# Patient Record
Sex: MALE | Race: WHITE | NOT HISPANIC OR LATINO | Employment: OTHER | RURAL
[De-identification: names, ages, dates, MRNs, and addresses within clinical notes are randomized per-mention and may not be internally consistent; named-entity substitution may affect disease eponyms.]

---

## 2017-02-08 RX ORDER — AMLODIPINE BESYLATE 5 MG/1
TABLET ORAL
Qty: 90 TABLET | Refills: 0 | Status: SHIPPED | OUTPATIENT
Start: 2017-02-08 | End: 2017-04-19 | Stop reason: SDUPTHER

## 2017-02-08 RX ORDER — ESOMEPRAZOLE MAGNESIUM 40 MG/1
CAPSULE, DELAYED RELEASE ORAL
Qty: 90 CAPSULE | Refills: 0 | Status: SHIPPED | OUTPATIENT
Start: 2017-02-08 | End: 2017-04-19 | Stop reason: SDUPTHER

## 2017-02-17 ENCOUNTER — LAB (OUTPATIENT)
Dept: FAMILY MEDICINE CLINIC | Facility: CLINIC | Age: 72
End: 2017-02-17

## 2017-02-17 DIAGNOSIS — R97.20 ELEVATED PSA: ICD-10-CM

## 2017-02-17 DIAGNOSIS — I10 ESSENTIAL HYPERTENSION: Primary | ICD-10-CM

## 2017-02-17 DIAGNOSIS — E78.5 HYPERLIPIDEMIA, UNSPECIFIED HYPERLIPIDEMIA TYPE: ICD-10-CM

## 2017-02-17 LAB
ALBUMIN SERPL-MCNC: 4.5 G/DL (ref 3.5–5)
ALBUMIN/GLOB SERPL: 1.6 G/DL (ref 1.1–2.5)
ALP SERPL-CCNC: 111 U/L (ref 24–120)
ALT SERPL-CCNC: 38 U/L (ref 0–54)
AST SERPL-CCNC: 28 U/L (ref 7–45)
BASOPHILS # BLD AUTO: 0.04 10*3/MM3 (ref 0–0.2)
BASOPHILS NFR BLD AUTO: 0.9 % (ref 0–2)
BILIRUB SERPL-MCNC: 0.6 MG/DL (ref 0.1–1)
BUN SERPL-MCNC: 19 MG/DL (ref 5–21)
BUN/CREAT SERPL: 17.1 (ref 7–25)
CALCIUM SERPL-MCNC: 9.6 MG/DL (ref 8.4–10.4)
CHLORIDE SERPL-SCNC: 103 MMOL/L (ref 98–110)
CHOLEST SERPL-MCNC: 186 MG/DL (ref 130–200)
CO2 SERPL-SCNC: 30 MMOL/L (ref 24–31)
CREAT SERPL-MCNC: 1.11 MG/DL (ref 0.5–1.4)
EOSINOPHIL # BLD AUTO: 0.15 10*3/MM3 (ref 0–0.7)
EOSINOPHIL NFR BLD AUTO: 3.3 % (ref 0–4)
ERYTHROCYTE [DISTWIDTH] IN BLOOD BY AUTOMATED COUNT: 13.5 % (ref 12–15)
GLOBULIN SER CALC-MCNC: 2.8 GM/DL
GLUCOSE SERPL-MCNC: 107 MG/DL (ref 70–100)
HCT VFR BLD AUTO: 46 % (ref 40–52)
HDLC SERPL-MCNC: 40 MG/DL
HGB BLD-MCNC: 16 G/DL (ref 14–18)
IMM GRANULOCYTES # BLD: 0.01 10*3/MM3 (ref 0–0.03)
IMM GRANULOCYTES NFR BLD: 0.2 % (ref 0–5)
LDLC SERPL CALC-MCNC: 124 MG/DL (ref 0–99)
LYMPHOCYTES # BLD AUTO: 1.08 10*3/MM3 (ref 0.72–4.86)
LYMPHOCYTES NFR BLD AUTO: 23.8 % (ref 15–45)
MCH RBC QN AUTO: 30.5 PG (ref 28–32)
MCHC RBC AUTO-ENTMCNC: 34.8 G/DL (ref 33–36)
MCV RBC AUTO: 87.6 FL (ref 82–95)
MONOCYTES # BLD AUTO: 0.53 10*3/MM3 (ref 0.19–1.3)
MONOCYTES NFR BLD AUTO: 11.7 % (ref 4–12)
NEUTROPHILS # BLD AUTO: 2.73 10*3/MM3 (ref 1.87–8.4)
NEUTROPHILS NFR BLD AUTO: 60.1 % (ref 39–78)
PLATELET # BLD AUTO: 241 10*3/MM3 (ref 130–400)
POTASSIUM SERPL-SCNC: 4.3 MMOL/L (ref 3.5–5.3)
PROT SERPL-MCNC: 7.3 G/DL (ref 6.3–8.7)
PSA SERPL-MCNC: 5.52 NG/ML (ref 0–4)
RBC # BLD AUTO: 5.25 10*6/MM3 (ref 4.8–5.9)
SODIUM SERPL-SCNC: 142 MMOL/L (ref 135–145)
TRIGL SERPL-MCNC: 108 MG/DL (ref 0–149)
TSH SERPL DL<=0.005 MIU/L-ACNC: 0.69 MIU/ML (ref 0.47–4.68)
VLDLC SERPL CALC-MCNC: 21.6 MG/DL
WBC # BLD AUTO: 4.54 10*3/MM3 (ref 4.8–10.8)

## 2017-02-22 DIAGNOSIS — R97.20 ELEVATED PSA: Primary | ICD-10-CM

## 2017-02-24 ENCOUNTER — OFFICE VISIT (OUTPATIENT)
Dept: FAMILY MEDICINE CLINIC | Facility: CLINIC | Age: 72
End: 2017-02-24

## 2017-02-24 VITALS
RESPIRATION RATE: 16 BRPM | SYSTOLIC BLOOD PRESSURE: 150 MMHG | BODY MASS INDEX: 30.54 KG/M2 | WEIGHT: 195 LBS | OXYGEN SATURATION: 97 % | HEART RATE: 69 BPM | DIASTOLIC BLOOD PRESSURE: 92 MMHG

## 2017-02-24 DIAGNOSIS — E78.2 MIXED HYPERLIPIDEMIA: ICD-10-CM

## 2017-02-24 DIAGNOSIS — K21.9 GERD WITHOUT ESOPHAGITIS: ICD-10-CM

## 2017-02-24 DIAGNOSIS — N52.9 ERECTILE DYSFUNCTION, UNSPECIFIED ERECTILE DYSFUNCTION TYPE: ICD-10-CM

## 2017-02-24 DIAGNOSIS — I10 ESSENTIAL HYPERTENSION: Primary | ICD-10-CM

## 2017-02-24 PROCEDURE — 99214 OFFICE O/P EST MOD 30 MIN: CPT | Performed by: FAMILY MEDICINE

## 2017-02-24 NOTE — PROGRESS NOTES
Subjective   Nehemiah Gomez is a 71 y.o. male.     Chief Complaint   Patient presents with   • Follow-up    on htn    History of Present Illness     he notes feeling good...denies any cp or ha..he is tolerating zetia without myalgais ...his gerd symptoms controleld without dysphgai or odynophagia...viagra working for e.d. symptoms      Current Outpatient Prescriptions:   •  amitriptyline (ELAVIL) 10 MG tablet, daily., Disp: , Rfl:   •  amLODIPine (NORVASC) 5 MG tablet, TAKE 1 TABLET BY MOUTH DAILY, Disp: 90 tablet, Rfl: 0  •  aspirin 81 MG chewable tablet, Take 81 mg by mouth daily., Disp: , Rfl:   •  esomeprazole (nexIUM) 40 MG capsule, TAKE 1 CAPSULE BY MOUTH DAILY, Disp: 90 capsule, Rfl: 0  •  ezetimibe (ZETIA) 10 MG tablet, Take 10 mg by mouth daily., Disp: , Rfl:   •  metoprolol tartrate (LOPRESSOR) 50 MG tablet, Take 50 mg by mouth 2 times daily., Disp: , Rfl:   •  sildenafil (VIAGRA) 100 MG tablet, Take 100 mg by mouth as needed for Erectile Dysfunction., Disp: , Rfl:   No Known Allergies    Past Medical History   Diagnosis Date   • Diverticulosis    • Erectile disorder due to 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    • GERD (gastroesophageal reflux disease)    • Hyperlipidemia    • Insomnia      Past Surgical History   Procedure Laterality Date   • Cholecystectomy         Review of Systems   Constitutional: Negative.    HENT: Negative.    Eyes: Negative.    Respiratory: Negative.    Cardiovascular: Negative.    Gastrointestinal: Negative.    Endocrine: Negative.    Genitourinary: Negative.    Musculoskeletal: Negative.    Skin: Negative.    Allergic/Immunologic: Negative.    Neurological: Negative.    Hematological: Negative.    Psychiatric/Behavioral: Negative.        Objective    Visit Vitals   • /92   • Pulse 69   • Resp 16   • Wt 195 lb (88.5 kg)   • SpO2 97%   • BMI 30.54 kg/m2     Physical Exam   Constitutional: He is oriented to person, place, and time. He appears well-developed and well-nourished.    HENT:   Head: Normocephalic and atraumatic.   Right Ear: External ear normal.   Left Ear: External ear normal.   Nose: Nose normal.   Mouth/Throat: Oropharynx is clear and moist.   Eyes: Conjunctivae and EOM are normal. Pupils are equal, round, and reactive to light.   Neck: Normal range of motion. Neck supple.   Cardiovascular: Normal rate, regular rhythm, normal heart sounds and intact distal pulses.    Pulmonary/Chest: Effort normal and breath sounds normal.   Abdominal: Soft. Bowel sounds are normal.   Musculoskeletal: Normal range of motion.   Neurological: He is alert and oriented to person, place, and time. He has normal reflexes.   Skin: Skin is warm and dry.   Psychiatric: He has a normal mood and affect. His behavior is normal. Judgment and thought content normal.   Nursing note and vitals reviewed.      Assessment/Plan   Nehemiah was seen today for follow-up.    Diagnoses and all orders for this visit:    Essential hypertension    Mixed hyperlipidemia    GERD without esophagitis    Erectile dysfunction, unspecified erectile dysfunction type    we discussed psa elevation--he has appt with dr choi soon  He says his colon is up to date             No orders of the defined types were placed in this encounter.      Follow up: 6 month(s)

## 2017-03-02 ENCOUNTER — OFFICE VISIT (OUTPATIENT)
Dept: UROLOGY | Facility: CLINIC | Age: 72
End: 2017-03-02

## 2017-03-02 VITALS
BODY MASS INDEX: 31.88 KG/M2 | TEMPERATURE: 97 F | DIASTOLIC BLOOD PRESSURE: 90 MMHG | HEIGHT: 66 IN | WEIGHT: 198.4 LBS | SYSTOLIC BLOOD PRESSURE: 122 MMHG

## 2017-03-02 DIAGNOSIS — R97.20 ELEVATED PROSTATE SPECIFIC ANTIGEN (PSA): Primary | ICD-10-CM

## 2017-03-02 DIAGNOSIS — N13.8 BPH (BENIGN PROSTATIC HYPERTROPHY) WITH URINARY OBSTRUCTION: ICD-10-CM

## 2017-03-02 DIAGNOSIS — N40.1 BPH (BENIGN PROSTATIC HYPERTROPHY) WITH URINARY OBSTRUCTION: ICD-10-CM

## 2017-03-02 LAB
BILIRUB BLD-MCNC: NEGATIVE MG/DL
CLARITY, POC: CLEAR
COLOR UR: YELLOW
GLUCOSE UR STRIP-MCNC: NEGATIVE MG/DL
KETONES UR QL: NEGATIVE
LEUKOCYTE EST, POC: NEGATIVE
NITRITE UR-MCNC: NEGATIVE MG/ML
PH UR: 6.5 [PH] (ref 5–8)
PROT UR STRIP-MCNC: NEGATIVE MG/DL
RBC # UR STRIP: NEGATIVE /UL
SP GR UR: 1.01 (ref 1–1.03)
UROBILINOGEN UR QL: NORMAL

## 2017-03-02 PROCEDURE — 99214 OFFICE O/P EST MOD 30 MIN: CPT | Performed by: UROLOGY

## 2017-03-02 PROCEDURE — 81003 URINALYSIS AUTO W/O SCOPE: CPT | Performed by: UROLOGY

## 2017-03-02 NOTE — PROGRESS NOTES
Subjective    Mr. Gomez is 71 y.o. male    CHIEF COMPLAINT: Elevated PSA    The patient was sent back to us from Dr. Whitman we had seen the patient is been almost a year and a half since we have seen him.  I has an elevated PSA of 5.4 his last PSA here was normal although in reviewing his chart he did have a spike in his PSA back in 2014 which then came back down to normal.    He has had no significant change in his BPH symptoms no urgency frequency nocturia fact his AUA score today is 0 he is had no urinary tract infections no gross hematuria etc.    I he said he has had intermittent virus all winter and I do not know whether that might have contributed to the elevated PSA    He also has a history of stones previously but is not had any long time his last KUB showed no calculi      History of Present Illness      The following portions of the patient's history were reviewed and updated as appropriate: allergies, current medications, past family history, past medical history, past social history, past surgical history and problem list.    Review of Systems   Constitutional: Negative.  Negative for chills and fever.   Gastrointestinal: Negative for abdominal distention, abdominal pain, anal bleeding, blood in stool and nausea.   Genitourinary: Negative for difficulty urinating, dysuria, flank pain, frequency, hematuria, penile pain, penile swelling and urgency.   Psychiatric/Behavioral: Negative.  Negative for agitation and confusion.         Current Outpatient Prescriptions:   •  amitriptyline (ELAVIL) 10 MG tablet, daily., Disp: , Rfl:   •  amLODIPine (NORVASC) 5 MG tablet, TAKE 1 TABLET BY MOUTH DAILY, Disp: 90 tablet, Rfl: 0  •  aspirin 81 MG chewable tablet, Take 81 mg by mouth daily., Disp: , Rfl:   •  esomeprazole (nexIUM) 40 MG capsule, TAKE 1 CAPSULE BY MOUTH DAILY, Disp: 90 capsule, Rfl: 0  •  ezetimibe (ZETIA) 10 MG tablet, Take 10 mg by mouth daily., Disp: , Rfl:   •  metoprolol tartrate (LOPRESSOR) 50  "MG tablet, Take 50 mg by mouth 2 times daily., Disp: , Rfl:   •  sildenafil (VIAGRA) 100 MG tablet, Take 100 mg by mouth as needed for Erectile Dysfunction., Disp: , Rfl:     Past Medical History   Diagnosis Date   • Diverticulosis    • Erectile disorder due to 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    • GERD (gastroesophageal reflux disease)    • Hyperlipidemia    • Insomnia        Past Surgical History   Procedure Laterality Date   • Cholecystectomy         Social History     Social History   • Marital status:      Spouse name: N/A   • Number of children: N/A   • Years of education: N/A     Occupational History   • retired      Social History Main Topics   • Smoking status: Former Smoker   • Smokeless tobacco: None   • Alcohol use Yes   • Drug use: No   • Sexual activity: Not Asked     Other Topics Concern   • None     Social History Narrative       Family History   Problem Relation Age of Onset   • No Known Problems Father    • No Known Problems Mother        Objective    Visit Vitals   • /90   • Temp 97 °F (36.1 °C)   • Ht 66\" (167.6 cm)   • Wt 198 lb 6.4 oz (90 kg)   • BMI 32.02 kg/m2       Physical Exam   Constitutional: He is oriented to person, place, and time. He appears well-developed and well-nourished.   Pulmonary/Chest: Effort normal.   Abdominal: Soft. He exhibits no distension and no mass. There is no tenderness. There is no rebound and no guarding. No hernia. Hernia confirmed negative in the right inguinal area and confirmed negative in the left inguinal area.   Genitourinary: Penis normal. Rectal exam shows no mass, no tenderness and anal tone normal. Enlarged: for the age of the patient. Right testis shows no mass, no swelling and no tenderness. Left testis shows no mass, no swelling and no tenderness. Circumcised. No hypospadias. No discharge found.   Genitourinary Comments:  The urethral meatus normal in position without evidence of stricture. Epididymis without mass or tenderness. Vas " Deferens is palpably normal.Anus and perineum without mass or tenderness. The prostate is approximately 30 ml. It is Symmetric, with a Soft consistency. There are no nodules present. . The seminal vesicles are Not palpable due to the size of the prostate.     Lymphadenopathy: No inguinal adenopathy noted on the right or left side.   Neurological: He is alert and oriented to person, place, and time.   Vitals reviewed.        Lab on 02/17/2017   Component Date Value Ref Range Status   • Glucose 02/17/2017 107* 70 - 100 mg/dL Final   • BUN 02/17/2017 19  5 - 21 mg/dL Final   • Creatinine 02/17/2017 1.11  0.50 - 1.40 mg/dL Final   • eGFR Non  Am 02/17/2017 65  >60 mL/min/1.73 Final   • eGFR African Am 02/17/2017 79  >60 mL/min/1.73 Final   • BUN/Creatinine Ratio 02/17/2017 17.1  7.0 - 25.0 Final   • Sodium 02/17/2017 142  135 - 145 mmol/L Final   • Potassium 02/17/2017 4.3  3.5 - 5.3 mmol/L Final   • Chloride 02/17/2017 103  98 - 110 mmol/L Final   • Total CO2 02/17/2017 30.0  24.0 - 31.0 mmol/L Final   • Calcium 02/17/2017 9.6  8.4 - 10.4 mg/dL Final   • Total Protein 02/17/2017 7.3  6.3 - 8.7 g/dL Final   • Albumin 02/17/2017 4.50  3.50 - 5.00 g/dL Final   • Globulin 02/17/2017 2.8  gm/dL Final   • A/G Ratio 02/17/2017 1.6  1.1 - 2.5 g/dL Final   • Total Bilirubin 02/17/2017 0.6  0.1 - 1.0 mg/dL Final   • Alkaline Phosphatase 02/17/2017 111  24 - 120 U/L Final   • AST (SGOT) 02/17/2017 28  7 - 45 U/L Final   • ALT (SGPT) 02/17/2017 38  0 - 54 U/L Final   • WBC 02/17/2017 4.54* 4.80 - 10.80 10*3/mm3 Final   • RBC 02/17/2017 5.25  4.80 - 5.90 10*6/mm3 Final   • Hemoglobin 02/17/2017 16.0  14.0 - 18.0 g/dL Final   • Hematocrit 02/17/2017 46.0  40.0 - 52.0 % Final   • MCV 02/17/2017 87.6  82.0 - 95.0 fL Final   • MCH 02/17/2017 30.5  28.0 - 32.0 pg Final   • MCHC 02/17/2017 34.8  33.0 - 36.0 g/dL Final   • RDW 02/17/2017 13.5  12.0 - 15.0 % Final   • Platelets 02/17/2017 241  130 - 400 10*3/mm3 Final   • Neutrophil  Rel % 02/17/2017 60.1  39.0 - 78.0 % Final   • Lymphocyte Rel % 02/17/2017 23.8  15.0 - 45.0 % Final   • Monocyte Rel % 02/17/2017 11.7  4.0 - 12.0 % Final   • Eosinophil Rel % 02/17/2017 3.3  0.0 - 4.0 % Final   • Basophil Rel % 02/17/2017 0.9  0.0 - 2.0 % Final   • Neutrophils Absolute 02/17/2017 2.73  1.87 - 8.40 10*3/mm3 Final   • Lymphocytes Absolute 02/17/2017 1.08  0.72 - 4.86 10*3/mm3 Final   • Monocytes Absolute 02/17/2017 0.53  0.19 - 1.30 10*3/mm3 Final   • Eosinophils Absolute 02/17/2017 0.15  0.00 - 0.70 10*3/mm3 Final   • Basophils Absolute 02/17/2017 0.04  0.00 - 0.20 10*3/mm3 Final   • Immature Granulocyte Rel % 02/17/2017 0.2  0.0 - 5.0 % Final   • Immature Grans Absolute 02/17/2017 0.01  0.00 - 0.03 10*3/mm3 Final   • Total Cholesterol 02/17/2017 186  130 - 200 mg/dL Final   • Triglycerides 02/17/2017 108  0 - 149 mg/dL Final   • HDL Cholesterol 02/17/2017 40  >=40 mg/dL Final   • VLDL Cholesterol 02/17/2017 21.6  mg/dL Final   • LDL Cholesterol  02/17/2017 124* 0 - 99 mg/dL Final   • TSH 02/17/2017 0.690  0.470 - 4.680 mIU/mL Final   • PSA 02/17/2017 5.520* 0.000 - 4.000 ng/mL Final       Results for orders placed or performed in visit on 03/02/17   POC Urinalysis Dipstick, Automated   Result Value Ref Range    Color Yellow Yellow, Straw, Dark Yellow, Thu    Clarity, UA Clear Clear    Glucose, UA Negative Negative, 1000 mg/dL (3+) mg/dL    Bilirubin Negative Negative    Ketones, UA Negative Negative    Specific Gravity  1.015 1.005 - 1.030    Blood, UA Negative Negative    pH, Urine 6.5 5.0 - 8.0    Protein, POC Negative Negative mg/dL    Urobilinogen, UA Normal Normal    Leukocytes Negative Negative    Nitrite, UA Negative Negative       Assessment and Plan    Nehemiah was seen today for elevated psa.    Diagnoses and all orders for this visit:    Elevated prostate specific antigen (PSA)  -     POC Urinalysis Dipstick, Automated  -     PSA, Total & Free; Future    BPH (benign prostatic  hypertrophy) with urinary obstruction    Plan--from a BPH point review he is asymptomatic and so we will just watch him along he does not need medications    I discussed the elevated PSA with the patient at length again he has had an elevated PSA before that come back down to normal so I do not think this unreasonable to repeat this again in 3-4 months with a free and total PSA.    I did indicate to him that obviously if it still elevated then we might want to consider an ultrasound and biopsy and he is comfortable but this with her questions today    EMR Dragon/Transcription disclaimer:  Much of this encounter note is an electronic transcription/translation of spoken language to printed text. The electronic translation of spoken language may permit erroneous, or at times, nonsensical words or phrases to be inadvertently transcribed; although I have reviewed the note for such errors, some may still exist.

## 2017-03-07 ENCOUNTER — RESULTS ENCOUNTER (OUTPATIENT)
Dept: UROLOGY | Facility: CLINIC | Age: 72
End: 2017-03-07

## 2017-03-07 DIAGNOSIS — R97.20 ELEVATED PROSTATE SPECIFIC ANTIGEN (PSA): ICD-10-CM

## 2017-03-14 RX ORDER — METOPROLOL TARTRATE 50 MG/1
TABLET, FILM COATED ORAL
Qty: 180 TABLET | Refills: 1 | Status: SHIPPED | OUTPATIENT
Start: 2017-03-14 | End: 2017-09-08 | Stop reason: SDUPTHER

## 2017-03-21 DIAGNOSIS — R05.9 COUGHING: Primary | ICD-10-CM

## 2017-03-22 ENCOUNTER — OFFICE VISIT (OUTPATIENT)
Dept: FAMILY MEDICINE CLINIC | Facility: CLINIC | Age: 72
End: 2017-03-22

## 2017-03-22 VITALS
RESPIRATION RATE: 16 BRPM | DIASTOLIC BLOOD PRESSURE: 80 MMHG | OXYGEN SATURATION: 97 % | WEIGHT: 199 LBS | BODY MASS INDEX: 31.98 KG/M2 | HEART RATE: 67 BPM | SYSTOLIC BLOOD PRESSURE: 132 MMHG | HEIGHT: 66 IN

## 2017-03-22 DIAGNOSIS — R05.9 COUGH: Primary | ICD-10-CM

## 2017-03-22 DIAGNOSIS — J40 BRONCHITIS: ICD-10-CM

## 2017-03-22 PROCEDURE — 99213 OFFICE O/P EST LOW 20 MIN: CPT | Performed by: FAMILY MEDICINE

## 2017-03-22 RX ORDER — MONTELUKAST SODIUM 10 MG/1
10 TABLET ORAL NIGHTLY
Qty: 30 TABLET | Refills: 2 | Status: SHIPPED | OUTPATIENT
Start: 2017-03-22 | End: 2017-07-03

## 2017-03-22 RX ORDER — CLARITHROMYCIN 500 MG/1
500 TABLET, COATED ORAL 2 TIMES DAILY
Qty: 20 TABLET | Refills: 0 | Status: SHIPPED | OUTPATIENT
Start: 2017-03-22 | End: 2017-04-19

## 2017-03-22 RX ORDER — METHYLPREDNISOLONE 4 MG/1
TABLET ORAL
Qty: 21 TABLET | Refills: 0 | Status: SHIPPED | OUTPATIENT
Start: 2017-03-22 | End: 2017-04-19

## 2017-03-22 RX ORDER — AMITRIPTYLINE HYDROCHLORIDE 10 MG/1
10 TABLET, FILM COATED ORAL DAILY
Qty: 90 TABLET | Refills: 1 | Status: SHIPPED | OUTPATIENT
Start: 2017-03-22 | End: 2017-09-08 | Stop reason: SDUPTHER

## 2017-03-22 NOTE — PROGRESS NOTES
Subjective   Nehemiah Gomez is a 71 y.o. male.     Chief Complaint   Patient presents with   • Cough       History of Present Illness     he devloped a cough last night --prod green sputum       Current Outpatient Prescriptions:   •  amLODIPine (NORVASC) 5 MG tablet, TAKE 1 TABLET BY MOUTH DAILY, Disp: 90 tablet, Rfl: 0  •  aspirin 81 MG chewable tablet, Take 81 mg by mouth daily., Disp: , Rfl:   •  esomeprazole (nexIUM) 40 MG capsule, TAKE 1 CAPSULE BY MOUTH DAILY, Disp: 90 capsule, Rfl: 0  •  ezetimibe (ZETIA) 10 MG tablet, Take 10 mg by mouth daily., Disp: , Rfl:   •  metoprolol tartrate (LOPRESSOR) 50 MG tablet, TAKE 1 TABLET BY MOUTH TWICE DAILY, Disp: 180 tablet, Rfl: 1  •  sildenafil (VIAGRA) 100 MG tablet, Take 100 mg by mouth as needed for Erectile Dysfunction., Disp: , Rfl:   •  amitriptyline (ELAVIL) 10 MG tablet, Take 1 tablet by mouth Daily., Disp: 90 tablet, Rfl: 1  •  clarithromycin (BIAXIN) 500 MG tablet, Take 1 tablet by mouth 2 (Two) Times a Day., Disp: 20 tablet, Rfl: 0  •  MethylPREDNISolone (MEDROL, ZUHAIR,) 4 MG tablet, Take as directed on package instructions., Disp: 21 tablet, Rfl: 0  •  montelukast (SINGULAIR) 10 MG tablet, Take 1 tablet by mouth Every Night., Disp: 30 tablet, Rfl: 2  No Known Allergies    Past Medical History:   Diagnosis Date   • Diverticulosis    • Erectile disorder due to 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    • GERD (gastroesophageal reflux disease)    • Hyperlipidemia    • Insomnia      Past Surgical History:   Procedure Laterality Date   • CHOLECYSTECTOMY         Review of Systems   Constitutional: Negative.    HENT: Negative.    Eyes: Negative.    Respiratory: Positive for cough.    Cardiovascular: Negative.    Gastrointestinal: Negative.    Endocrine: Negative.    Genitourinary: Negative.    Musculoskeletal: Negative.    Skin: Negative.    Allergic/Immunologic: Negative.    Neurological: Negative.    Hematological: Negative.    Psychiatric/Behavioral: Negative.   "      Objective  /80  Pulse 67  Resp 16  Ht 66\" (167.6 cm)  Wt 199 lb (90.3 kg)  SpO2 97%  BMI 32.12 kg/m2  Physical Exam   Constitutional: He is oriented to person, place, and time. He appears well-nourished.   HENT:   Head: Normocephalic and atraumatic.   Right Ear: External ear normal.   Left Ear: External ear normal.   Nose: Nose normal.   Mouth/Throat: Oropharynx is clear and moist.   Eyes: Conjunctivae and EOM are normal. Pupils are equal, round, and reactive to light.   Neck: Normal range of motion. Neck supple.   Cardiovascular: Normal rate, regular rhythm, normal heart sounds and intact distal pulses.    Pulmonary/Chest: Effort normal.   Rhonchi scattered   Abdominal: Soft. Bowel sounds are normal.   Musculoskeletal: Normal range of motion.   Neurological: He is alert and oriented to person, place, and time. He has normal reflexes.   Skin: Skin is warm and dry.   Psychiatric: He has a normal mood and affect. His behavior is normal. Judgment and thought content normal.   Nursing note and vitals reviewed.      Assessment/Plan   Nehemiah was seen today for cough.    Diagnoses and all orders for this visit:    Cough    Bronchitis    Other orders  -     clarithromycin (BIAXIN) 500 MG tablet; Take 1 tablet by mouth 2 (Two) Times a Day.  -     MethylPREDNISolone (MEDROL, ZUHAIR,) 4 MG tablet; Take as directed on package instructions.  -     montelukast (SINGULAIR) 10 MG tablet; Take 1 tablet by mouth Every Night.                 No orders of the defined types were placed in this encounter.      Follow up: 4 week(s)  "

## 2017-04-19 ENCOUNTER — OFFICE VISIT (OUTPATIENT)
Dept: FAMILY MEDICINE CLINIC | Facility: CLINIC | Age: 72
End: 2017-04-19

## 2017-04-19 VITALS
OXYGEN SATURATION: 97 % | HEART RATE: 59 BPM | WEIGHT: 198 LBS | SYSTOLIC BLOOD PRESSURE: 132 MMHG | HEIGHT: 66 IN | DIASTOLIC BLOOD PRESSURE: 90 MMHG | RESPIRATION RATE: 16 BRPM | BODY MASS INDEX: 31.82 KG/M2

## 2017-04-19 DIAGNOSIS — J40 BRONCHITIS: ICD-10-CM

## 2017-04-19 DIAGNOSIS — I10 ESSENTIAL HYPERTENSION: Primary | ICD-10-CM

## 2017-04-19 PROCEDURE — 99213 OFFICE O/P EST LOW 20 MIN: CPT | Performed by: FAMILY MEDICINE

## 2017-04-19 RX ORDER — ESOMEPRAZOLE MAGNESIUM 40 MG/1
40 CAPSULE, DELAYED RELEASE ORAL
Qty: 90 CAPSULE | Refills: 1 | Status: SHIPPED | OUTPATIENT
Start: 2017-04-19 | End: 2017-09-08 | Stop reason: ALTCHOICE

## 2017-04-19 RX ORDER — EZETIMIBE 10 MG/1
10 TABLET ORAL DAILY
Qty: 90 TABLET | Refills: 1 | Status: SHIPPED | OUTPATIENT
Start: 2017-04-19 | End: 2017-09-08 | Stop reason: SDUPTHER

## 2017-04-19 RX ORDER — AMLODIPINE BESYLATE 5 MG/1
5 TABLET ORAL DAILY
Qty: 90 TABLET | Refills: 1 | Status: SHIPPED | OUTPATIENT
Start: 2017-04-19 | End: 2017-09-08 | Stop reason: SDUPTHER

## 2017-04-19 NOTE — PROGRESS NOTES
"Subjective   Nehemiah Gomez is a 71 y.o. male.     Chief Complaint   Patient presents with   • Follow-up     cough-   resolved now        History of Present Illness     he notes his cough has resolved--he notes his bp has been dong well--no cp or dyspnea      Current Outpatient Prescriptions:   •  amitriptyline (ELAVIL) 10 MG tablet, Take 1 tablet by mouth Daily., Disp: 90 tablet, Rfl: 1  •  amLODIPine (NORVASC) 5 MG tablet, Take 1 tablet by mouth Daily., Disp: 90 tablet, Rfl: 1  •  aspirin 81 MG chewable tablet, Take 81 mg by mouth daily., Disp: , Rfl:   •  esomeprazole (nexIUM) 40 MG capsule, Take 1 capsule by mouth Every Morning Before Breakfast., Disp: 90 capsule, Rfl: 1  •  ezetimibe (ZETIA) 10 MG tablet, Take 1 tablet by mouth Daily., Disp: 90 tablet, Rfl: 1  •  metoprolol tartrate (LOPRESSOR) 50 MG tablet, TAKE 1 TABLET BY MOUTH TWICE DAILY, Disp: 180 tablet, Rfl: 1  •  montelukast (SINGULAIR) 10 MG tablet, Take 1 tablet by mouth Every Night., Disp: 30 tablet, Rfl: 2  •  sildenafil (VIAGRA) 100 MG tablet, Take 100 mg by mouth as needed for Erectile Dysfunction., Disp: , Rfl:   No Known Allergies    Past Medical History:   Diagnosis Date   • Diverticulosis    • Erectile disorder due to 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    • GERD (gastroesophageal reflux disease)    • Hyperlipidemia    • Insomnia      Past Surgical History:   Procedure Laterality Date   • CHOLECYSTECTOMY         Review of Systems   Constitutional: Negative.    HENT: Negative.    Eyes: Negative.    Respiratory: Positive for cough (resolved).    Cardiovascular: Negative.    Gastrointestinal: Negative.    Endocrine: Negative.    Genitourinary: Negative.    Musculoskeletal: Negative.    Skin: Negative.    Allergic/Immunologic: Negative.    Neurological: Negative.    Hematological: Negative.    Psychiatric/Behavioral: Negative.        Objective  /90  Pulse 59  Resp 16  Ht 66\" (167.6 cm)  Wt 198 lb (89.8 kg)  SpO2 97%  BMI 31.96 " kg/m2  Physical Exam   Constitutional: He is oriented to person, place, and time. He appears well-developed and well-nourished.   HENT:   Head: Normocephalic and atraumatic.   Right Ear: External ear normal.   Left Ear: External ear normal.   Nose: Nose normal.   Mouth/Throat: Oropharynx is clear and moist.   Eyes: Conjunctivae and EOM are normal. Pupils are equal, round, and reactive to light.   Neck: Normal range of motion. Neck supple.   Cardiovascular: Normal rate, regular rhythm, normal heart sounds and intact distal pulses.    Pulmonary/Chest: Effort normal and breath sounds normal.   Abdominal: Soft. Bowel sounds are normal.   Musculoskeletal: Normal range of motion.   Neurological: He is alert and oriented to person, place, and time. He has normal reflexes.   Skin: Skin is warm and dry.   Psychiatric: He has a normal mood and affect. His behavior is normal. Judgment and thought content normal.   Nursing note and vitals reviewed.      Assessment/Plan   Nehemiah was seen today for follow-up.    Diagnoses and all orders for this visit:    Essential hypertension    Bronchitis      im glad the cough is bwetter           No orders of the defined types were placed in this encounter.      Follow up: next week

## 2017-06-27 LAB
PSA FREE MFR SERPL: 16 %
PSA FREE SERPL-MCNC: 0.77 NG/ML
PSA SERPL-MCNC: 4.8 NG/ML (ref 0–4)

## 2017-07-03 ENCOUNTER — OFFICE VISIT (OUTPATIENT)
Dept: UROLOGY | Facility: CLINIC | Age: 72
End: 2017-07-03

## 2017-07-03 VITALS
TEMPERATURE: 96.7 F | DIASTOLIC BLOOD PRESSURE: 86 MMHG | WEIGHT: 193.2 LBS | HEIGHT: 67 IN | BODY MASS INDEX: 30.32 KG/M2 | SYSTOLIC BLOOD PRESSURE: 140 MMHG

## 2017-07-03 DIAGNOSIS — R97.20 ELEVATED PROSTATE SPECIFIC ANTIGEN (PSA): Primary | ICD-10-CM

## 2017-07-03 DIAGNOSIS — N40.1 BPH (BENIGN PROSTATIC HYPERTROPHY) WITH URINARY OBSTRUCTION: ICD-10-CM

## 2017-07-03 DIAGNOSIS — N13.8 BPH (BENIGN PROSTATIC HYPERTROPHY) WITH URINARY OBSTRUCTION: ICD-10-CM

## 2017-07-03 LAB
BILIRUB BLD-MCNC: NEGATIVE MG/DL
CLARITY, POC: CLEAR
COLOR UR: YELLOW
GLUCOSE UR STRIP-MCNC: NEGATIVE MG/DL
KETONES UR QL: NEGATIVE
LEUKOCYTE EST, POC: NEGATIVE
NITRITE UR-MCNC: NEGATIVE MG/ML
PH UR: 6 [PH] (ref 5–8)
PROT UR STRIP-MCNC: NEGATIVE MG/DL
RBC # UR STRIP: NEGATIVE /UL
SP GR UR: 1.02 (ref 1–1.03)
UROBILINOGEN UR QL: NORMAL

## 2017-07-03 PROCEDURE — 81003 URINALYSIS AUTO W/O SCOPE: CPT | Performed by: UROLOGY

## 2017-07-03 PROCEDURE — 99213 OFFICE O/P EST LOW 20 MIN: CPT | Performed by: UROLOGY

## 2017-07-03 NOTE — PROGRESS NOTES
Subjective    Mr. Gomez is 71 y.o. male    CHIEF COMPLAINT: Elevated PSA      The patient comes back today for follow-up of PSA as he had indicated before he did have some sort of chronic illness that lasted really until I saw him back in March he took some antibiotics that is pretty much now resolved his PSA is still slightly elevated at 4.8 but less than was previously of 5.4 his examination had been benign    History PSA was 16% so no real help there    As far as his BPH symptoms his AUA score today is 4 denies any gross hematuria urinary tract infection she is not on any medications for his prostate at this time          History of Present Illness      The following portions of the patient's history were reviewed and updated as appropriate: allergies, current medications, past family history, past medical history, past social history, past surgical history and problem list.    Review of Systems   Constitutional: Negative.  Negative for chills and fever.   Gastrointestinal: Negative for abdominal distention, abdominal pain, anal bleeding, blood in stool and nausea.   Genitourinary: Negative for difficulty urinating, dysuria, flank pain, frequency, hematuria and urgency.   Psychiatric/Behavioral: Negative.  Negative for agitation and confusion.         Current Outpatient Prescriptions:   •  amitriptyline (ELAVIL) 10 MG tablet, Take 1 tablet by mouth Daily., Disp: 90 tablet, Rfl: 1  •  amLODIPine (NORVASC) 5 MG tablet, Take 1 tablet by mouth Daily., Disp: 90 tablet, Rfl: 1  •  aspirin 81 MG chewable tablet, Take 81 mg by mouth daily., Disp: , Rfl:   •  esomeprazole (nexIUM) 40 MG capsule, Take 1 capsule by mouth Every Morning Before Breakfast., Disp: 90 capsule, Rfl: 1  •  ezetimibe (ZETIA) 10 MG tablet, Take 1 tablet by mouth Daily., Disp: 90 tablet, Rfl: 1  •  metoprolol tartrate (LOPRESSOR) 50 MG tablet, TAKE 1 TABLET BY MOUTH TWICE DAILY, Disp: 180 tablet, Rfl: 1  •  sildenafil (VIAGRA) 100 MG tablet, Take 100  "mg by mouth as needed for Erectile Dysfunction., Disp: , Rfl:     Past Medical History:   Diagnosis Date   • Diverticulosis    • Erectile disorder due to 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    • GERD (gastroesophageal reflux disease)    • Hyperlipidemia    • Hypertension    • Insomnia        Past Surgical History:   Procedure Laterality Date   • CHOLECYSTECTOMY         Social History     Social History   • Marital status:      Spouse name: N/A   • Number of children: N/A   • Years of education: N/A     Occupational History   • retired      Social History Main Topics   • Smoking status: Former Smoker   • Smokeless tobacco: None   • Alcohol use Yes   • Drug use: No   • Sexual activity: Not Asked     Other Topics Concern   • None     Social History Narrative       Family History   Problem Relation Age of Onset   • No Known Problems Father    • No Known Problems Mother        Objective    /86  Temp 96.7 °F (35.9 °C)  Ht 67\" (170.2 cm)  Wt 193 lb 3.2 oz (87.6 kg)  BMI 30.26 kg/m2    Physical Exam      Results Encounter on 03/07/2017   Component Date Value Ref Range Status   • PSA 06/26/2017 4.8* 0.0 - 4.0 ng/mL Final    Comment: Roche ECLIA methodology.  According to the American U[employer] Association, Serum PSA should  decrease and remain at undetectable levels after radical  prostatectomy. The AUA defines biochemical recurrence as an initial  PSA value 0.2 ng/mL or greater followed by a subsequent confirmatory  PSA value 0.2 ng/mL or greater.  Values obtained with different assay methods or kits cannot be used  interchangeably. Results cannot be interpreted as absolute evidence  of the presence or absence of malignant disease.     • PSA, Free 06/26/2017 0.77  N/A ng/mL Final    Roche ECLIA methodology.   • PSA, Free % 06/26/2017 16.0  % Final    Comment: The table below lists the probability of prostate cancer for  men with non-suspicious MIKEY results and total PSA between  4 and 10 ng/mL, by patient " age (Ronald et al, BARTOLO 1998,  279:1542).                    % Free PSA       50-64 yr        65-75 yr                    0.00-10.00%        56%             55%                   10.01-15.00%        24%             35%                   15.01-20.00%        17%             23%                   20.01-25.00%        10%             20%                        >25.00%         5%              9%  Please note:  Ronald et al did not make specific                recommendations regarding the use of                percent free PSA for any other population                of men.         Results for orders placed or performed in visit on 03/07/17   PSA, Total & Free   Result Value Ref Range    PSA 4.8 (H) 0.0 - 4.0 ng/mL    PSA, Free 0.77 N/A ng/mL    PSA, Free % 16.0 %       Assessment and Plan    Nehemiah was seen today for elevated psa.    Diagnoses and all orders for this visit:    Elevated prostate specific antigen (PSA)    BPH (benign prostatic hypertrophy) with urinary obstruction    Plan--I discussed the options the patient at this time regarding either a biopsy or continued observation.    I told him I could not guarantee that he does not have prostate cancer however certainly a PSA has come down his exam was benign.    I think he is comfortable with observation this point and so we will seen back again in 6 months time we will repeat PSA then if he should have any symptoms or troubles prior then he will call

## 2017-07-08 ENCOUNTER — RESULTS ENCOUNTER (OUTPATIENT)
Dept: UROLOGY | Facility: CLINIC | Age: 72
End: 2017-07-08

## 2017-07-08 DIAGNOSIS — R97.20 ELEVATED PROSTATE SPECIFIC ANTIGEN (PSA): ICD-10-CM

## 2017-08-22 LAB — PSA SERPL-MCNC: 3.51 NG/ML (ref 0–4)

## 2017-08-24 ENCOUNTER — OFFICE VISIT (OUTPATIENT)
Dept: FAMILY MEDICINE CLINIC | Facility: CLINIC | Age: 72
End: 2017-08-24

## 2017-08-24 VITALS
SYSTOLIC BLOOD PRESSURE: 126 MMHG | WEIGHT: 193 LBS | DIASTOLIC BLOOD PRESSURE: 78 MMHG | RESPIRATION RATE: 16 BRPM | HEIGHT: 67 IN | BODY MASS INDEX: 30.29 KG/M2 | TEMPERATURE: 98.6 F | HEART RATE: 74 BPM

## 2017-08-24 DIAGNOSIS — K21.9 GERD WITHOUT ESOPHAGITIS: ICD-10-CM

## 2017-08-24 DIAGNOSIS — E78.2 MIXED HYPERLIPIDEMIA: Primary | ICD-10-CM

## 2017-08-24 DIAGNOSIS — I10 ESSENTIAL HYPERTENSION: ICD-10-CM

## 2017-08-24 DIAGNOSIS — N52.9 ERECTILE DYSFUNCTION, UNSPECIFIED ERECTILE DYSFUNCTION TYPE: ICD-10-CM

## 2017-08-24 PROCEDURE — 99213 OFFICE O/P EST LOW 20 MIN: CPT | Performed by: FAMILY MEDICINE

## 2017-08-24 NOTE — PROGRESS NOTES
"Subjective   Nehemiah Gomez is a 71 y.o. male.     Chief Complaint   Patient presents with   • Follow-up     6 mo        History of Present Illness     she notes good bp control wituout cp or ha...he nots gerd managed without dysphagia...tolerating lipid tx witout myalgias ..using viagara effectively for e.d.      Current Outpatient Prescriptions:   •  amitriptyline (ELAVIL) 10 MG tablet, Take 1 tablet by mouth Daily., Disp: 90 tablet, Rfl: 1  •  amLODIPine (NORVASC) 5 MG tablet, Take 1 tablet by mouth Daily., Disp: 90 tablet, Rfl: 1  •  aspirin 81 MG chewable tablet, Take 81 mg by mouth daily., Disp: , Rfl:   •  esomeprazole (nexIUM) 40 MG capsule, Take 1 capsule by mouth Every Morning Before Breakfast., Disp: 90 capsule, Rfl: 1  •  ezetimibe (ZETIA) 10 MG tablet, Take 1 tablet by mouth Daily., Disp: 90 tablet, Rfl: 1  •  metoprolol tartrate (LOPRESSOR) 50 MG tablet, TAKE 1 TABLET BY MOUTH TWICE DAILY, Disp: 180 tablet, Rfl: 1  •  sildenafil (VIAGRA) 100 MG tablet, Take 100 mg by mouth as needed for Erectile Dysfunction., Disp: , Rfl:   No Known Allergies    Past Medical History:   Diagnosis Date   • Diverticulosis    • Erectile disorder due to 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    • GERD (gastroesophageal reflux disease)    • Hyperlipidemia    • Hypertension    • Insomnia      Past Surgical History:   Procedure Laterality Date   • CHOLECYSTECTOMY         Review of Systems   Constitutional: Negative.    Eyes: Negative.    Respiratory: Negative.    Cardiovascular: Negative.    Gastrointestinal: Negative.    Endocrine: Negative.    Genitourinary: Negative.    Musculoskeletal: Negative.    Skin: Negative.    Allergic/Immunologic: Negative.    Neurological: Negative.    Hematological: Negative.    Psychiatric/Behavioral: Negative.        Objective  /78  Pulse 74  Temp 98.6 °F (37 °C)  Resp 16  Ht 67\" (170.2 cm)  Wt 193 lb (87.5 kg)  BMI 30.23 kg/m2  Physical Exam   Constitutional: He is oriented to person, " place, and time. He appears well-developed and well-nourished.   HENT:   Head: Normocephalic and atraumatic.   Right Ear: External ear normal.   Left Ear: External ear normal.   Nose: Nose normal.   Mouth/Throat: Oropharynx is clear and moist.   Eyes: Conjunctivae and EOM are normal. Pupils are equal, round, and reactive to light.   Neck: Normal range of motion. Neck supple.   Cardiovascular: Normal rate, regular rhythm, normal heart sounds and intact distal pulses.    Pulmonary/Chest: Effort normal and breath sounds normal.   Abdominal: Soft. Bowel sounds are normal.   Musculoskeletal: Normal range of motion.   Neurological: He is alert and oriented to person, place, and time. He has normal reflexes.   Skin: Skin is warm and dry.   Psychiatric: He has a normal mood and affect. His behavior is normal. Judgment and thought content normal.   Nursing note and vitals reviewed.      Assessment/Plan   Nehemiah was seen today for follow-up.    Diagnoses and all orders for this visit:    Mixed hyperlipidemia  -     Lipid Panel    Essential hypertension  -     CBC w AUTO Differential  -     Comprehensive Metabolic Panel    GERD without esophagitis    Erectile dysfunction, unspecified erectile dysfunction type        He says he is current on colonoscopy         Orders Placed This Encounter   Procedures   • Comprehensive Metabolic Panel   • Lipid Panel   • CBC w AUTO Differential     Order Specific Question:   Manual Differential     Answer:   No       Follow up: 6 month(s)

## 2017-08-25 ENCOUNTER — TELEPHONE (OUTPATIENT)
Dept: FAMILY MEDICINE CLINIC | Facility: CLINIC | Age: 72
End: 2017-08-25

## 2017-08-25 LAB
ALBUMIN SERPL-MCNC: 4.3 G/DL (ref 3.5–5)
ALBUMIN/GLOB SERPL: 1.7 G/DL (ref 1.1–2.5)
ALP SERPL-CCNC: 97 U/L (ref 24–120)
ALT SERPL-CCNC: 44 U/L (ref 0–54)
AST SERPL-CCNC: 27 U/L (ref 7–45)
BASOPHILS # BLD AUTO: 0.05 10*3/MM3 (ref 0–0.2)
BASOPHILS NFR BLD AUTO: 0.9 % (ref 0–2)
BILIRUB SERPL-MCNC: 0.5 MG/DL (ref 0.1–1)
BUN SERPL-MCNC: 22 MG/DL (ref 5–21)
BUN/CREAT SERPL: 18.6 (ref 7–25)
CALCIUM SERPL-MCNC: 9.3 MG/DL (ref 8.4–10.4)
CHLORIDE SERPL-SCNC: 104 MMOL/L (ref 98–110)
CHOLEST SERPL-MCNC: 171 MG/DL (ref 130–200)
CO2 SERPL-SCNC: 25 MMOL/L (ref 24–31)
CREAT SERPL-MCNC: 1.18 MG/DL (ref 0.5–1.4)
EOSINOPHIL # BLD AUTO: 0.13 10*3/MM3 (ref 0–0.7)
EOSINOPHIL NFR BLD AUTO: 2.4 % (ref 0–4)
ERYTHROCYTE [DISTWIDTH] IN BLOOD BY AUTOMATED COUNT: 13.5 % (ref 12–15)
GLOBULIN SER CALC-MCNC: 2.6 GM/DL
GLUCOSE SERPL-MCNC: 92 MG/DL (ref 70–100)
HCT VFR BLD AUTO: 46.1 % (ref 40–52)
HDLC SERPL-MCNC: 40 MG/DL
HGB BLD-MCNC: 15.1 G/DL (ref 14–18)
IMM GRANULOCYTES # BLD: 0.02 10*3/MM3 (ref 0–0.03)
IMM GRANULOCYTES NFR BLD: 0.4 % (ref 0–5)
LDLC SERPL CALC-MCNC: 99 MG/DL (ref 0–99)
LYMPHOCYTES # BLD AUTO: 1.37 10*3/MM3 (ref 0.72–4.86)
LYMPHOCYTES NFR BLD AUTO: 25 % (ref 15–45)
MCH RBC QN AUTO: 29.7 PG (ref 28–32)
MCHC RBC AUTO-ENTMCNC: 32.8 G/DL (ref 33–36)
MCV RBC AUTO: 90.7 FL (ref 82–95)
MONOCYTES # BLD AUTO: 0.43 10*3/MM3 (ref 0.19–1.3)
MONOCYTES NFR BLD AUTO: 7.8 % (ref 4–12)
NEUTROPHILS # BLD AUTO: 3.48 10*3/MM3 (ref 1.87–8.4)
NEUTROPHILS NFR BLD AUTO: 63.5 % (ref 39–78)
PLATELET # BLD AUTO: 202 10*3/MM3 (ref 130–400)
POTASSIUM SERPL-SCNC: 4.7 MMOL/L (ref 3.5–5.3)
PROT SERPL-MCNC: 6.9 G/DL (ref 6.3–8.7)
RBC # BLD AUTO: 5.08 10*6/MM3 (ref 4.8–5.9)
SODIUM SERPL-SCNC: 139 MMOL/L (ref 135–145)
TRIGL SERPL-MCNC: 159 MG/DL (ref 0–149)
VLDLC SERPL CALC-MCNC: 31.8 MG/DL
WBC # BLD AUTO: 5.48 10*3/MM3 (ref 4.8–10.8)

## 2017-09-08 RX ORDER — AMLODIPINE BESYLATE 5 MG/1
5 TABLET ORAL DAILY
Qty: 90 TABLET | Refills: 1 | Status: SHIPPED | OUTPATIENT
Start: 2017-09-08 | End: 2018-04-27 | Stop reason: SDUPTHER

## 2017-09-08 RX ORDER — EZETIMIBE 10 MG/1
10 TABLET ORAL DAILY
Qty: 90 TABLET | Refills: 1 | Status: SHIPPED | OUTPATIENT
Start: 2017-09-08 | End: 2018-04-27 | Stop reason: SDUPTHER

## 2017-09-08 RX ORDER — METOPROLOL TARTRATE 50 MG/1
50 TABLET, FILM COATED ORAL 2 TIMES DAILY
Qty: 180 TABLET | Refills: 1 | Status: SHIPPED | OUTPATIENT
Start: 2017-09-08 | End: 2018-03-20 | Stop reason: SDUPTHER

## 2017-09-08 RX ORDER — AMITRIPTYLINE HYDROCHLORIDE 10 MG/1
10 TABLET, FILM COATED ORAL DAILY
Qty: 90 TABLET | Refills: 1 | Status: SHIPPED | OUTPATIENT
Start: 2017-09-08 | End: 2018-02-21

## 2017-10-16 RX ORDER — AMITRIPTYLINE HYDROCHLORIDE 25 MG/1
25 TABLET, FILM COATED ORAL NIGHTLY
Qty: 90 TABLET | Refills: 1 | Status: SHIPPED | OUTPATIENT
Start: 2017-10-16 | End: 2018-03-20 | Stop reason: SDUPTHER

## 2017-11-03 RX ORDER — ESOMEPRAZOLE MAGNESIUM 40 MG/1
CAPSULE, DELAYED RELEASE ORAL
Qty: 90 CAPSULE | Refills: 0 | Status: SHIPPED | OUTPATIENT
Start: 2017-11-03 | End: 2018-02-02 | Stop reason: SDUPTHER

## 2018-02-05 RX ORDER — ESOMEPRAZOLE MAGNESIUM 40 MG/1
CAPSULE, DELAYED RELEASE ORAL
Qty: 90 CAPSULE | Refills: 0 | Status: SHIPPED | OUTPATIENT
Start: 2018-02-05 | End: 2018-05-06 | Stop reason: SDUPTHER

## 2018-02-15 ENCOUNTER — LAB (OUTPATIENT)
Dept: FAMILY MEDICINE CLINIC | Facility: CLINIC | Age: 73
End: 2018-02-15

## 2018-02-15 DIAGNOSIS — I10 HYPERTENSION, UNSPECIFIED TYPE: Primary | ICD-10-CM

## 2018-02-15 LAB
ALBUMIN SERPL-MCNC: 3.9 G/DL (ref 3.5–5)
ALBUMIN/GLOB SERPL: 1.4 G/DL (ref 1.1–2.5)
ALP SERPL-CCNC: 90 U/L (ref 24–120)
ALT SERPL-CCNC: 33 U/L (ref 0–54)
AST SERPL-CCNC: 24 U/L (ref 7–45)
BASOPHILS # BLD AUTO: 0.07 10*3/MM3 (ref 0–0.2)
BASOPHILS NFR BLD AUTO: 1.4 % (ref 0–2)
BILIRUB SERPL-MCNC: 0.4 MG/DL (ref 0.1–1)
BUN SERPL-MCNC: 16 MG/DL (ref 5–21)
BUN/CREAT SERPL: 16.2 (ref 7–25)
CALCIUM SERPL-MCNC: 9.3 MG/DL (ref 8.4–10.4)
CHLORIDE SERPL-SCNC: 102 MMOL/L (ref 98–110)
CHOLEST SERPL-MCNC: 171 MG/DL (ref 130–200)
CO2 SERPL-SCNC: 30 MMOL/L (ref 24–31)
CREAT SERPL-MCNC: 0.99 MG/DL (ref 0.5–1.4)
EOSINOPHIL # BLD AUTO: 0.22 10*3/MM3 (ref 0–0.7)
EOSINOPHIL NFR BLD AUTO: 4.4 % (ref 0–4)
ERYTHROCYTE [DISTWIDTH] IN BLOOD BY AUTOMATED COUNT: 12.3 % (ref 12–15)
GFR SERPLBLD CREATININE-BSD FMLA CKD-EPI: 75 ML/MIN/1.73
GFR SERPLBLD CREATININE-BSD FMLA CKD-EPI: 90 ML/MIN/1.73
GLOBULIN SER CALC-MCNC: 2.8 GM/DL
GLUCOSE SERPL-MCNC: 105 MG/DL (ref 70–100)
HCT VFR BLD AUTO: 46.3 % (ref 40–52)
HDLC SERPL-MCNC: 38 MG/DL
HGB BLD-MCNC: 15.9 G/DL (ref 14–18)
IMM GRANULOCYTES # BLD: 0.02 10*3/MM3 (ref 0–0.03)
IMM GRANULOCYTES NFR BLD: 0.4 % (ref 0–5)
LDLC SERPL CALC-MCNC: 110 MG/DL (ref 0–99)
LYMPHOCYTES # BLD AUTO: 1.29 10*3/MM3 (ref 0.72–4.86)
LYMPHOCYTES NFR BLD AUTO: 25.9 % (ref 15–45)
MCH RBC QN AUTO: 29.4 PG (ref 28–32)
MCHC RBC AUTO-ENTMCNC: 34.3 G/DL (ref 33–36)
MCV RBC AUTO: 85.6 FL (ref 82–95)
MONOCYTES # BLD AUTO: 0.53 10*3/MM3 (ref 0.19–1.3)
MONOCYTES NFR BLD AUTO: 10.6 % (ref 4–12)
NEUTROPHILS # BLD AUTO: 2.86 10*3/MM3 (ref 1.87–8.4)
NEUTROPHILS NFR BLD AUTO: 57.3 % (ref 39–78)
NRBC BLD AUTO-RTO: 0 /100 WBC (ref 0–0)
PLATELET # BLD AUTO: 224 10*3/MM3 (ref 130–400)
POTASSIUM SERPL-SCNC: 4.3 MMOL/L (ref 3.5–5.3)
PROT SERPL-MCNC: 6.7 G/DL (ref 6.3–8.7)
RBC # BLD AUTO: 5.41 10*6/MM3 (ref 4.8–5.9)
SODIUM SERPL-SCNC: 142 MMOL/L (ref 135–145)
TRIGL SERPL-MCNC: 114 MG/DL (ref 0–149)
VLDLC SERPL CALC-MCNC: 22.8 MG/DL
WBC # BLD AUTO: 4.99 10*3/MM3 (ref 4.8–10.8)

## 2018-02-21 ENCOUNTER — OFFICE VISIT (OUTPATIENT)
Dept: FAMILY MEDICINE CLINIC | Facility: CLINIC | Age: 73
End: 2018-02-21

## 2018-02-21 VITALS
HEIGHT: 67 IN | HEART RATE: 58 BPM | RESPIRATION RATE: 16 BRPM | DIASTOLIC BLOOD PRESSURE: 88 MMHG | OXYGEN SATURATION: 98 % | BODY MASS INDEX: 30.92 KG/M2 | WEIGHT: 197 LBS | SYSTOLIC BLOOD PRESSURE: 128 MMHG

## 2018-02-21 DIAGNOSIS — N52.9 ERECTILE DYSFUNCTION, UNSPECIFIED ERECTILE DYSFUNCTION TYPE: ICD-10-CM

## 2018-02-21 DIAGNOSIS — I10 ESSENTIAL HYPERTENSION: Primary | ICD-10-CM

## 2018-02-21 DIAGNOSIS — K21.9 GERD WITHOUT ESOPHAGITIS: ICD-10-CM

## 2018-02-21 DIAGNOSIS — F51.01 PRIMARY INSOMNIA: ICD-10-CM

## 2018-02-21 DIAGNOSIS — E78.2 MIXED HYPERLIPIDEMIA: ICD-10-CM

## 2018-02-21 PROCEDURE — 99214 OFFICE O/P EST MOD 30 MIN: CPT | Performed by: FAMILY MEDICINE

## 2018-02-21 NOTE — PROGRESS NOTES
Subjective   Nehemiah Gomez is a 72 y.o. male.     Chief Complaint   Patient presents with   • Follow-up     6 mo   pt states that he is taking elavil 25 mg  1 1/2 tab QHS instead of the prescribed  1 tab QHS.  he is req to have the dose increased or have the quantity of pills increased.         History of Present Illness     he noted insomia is stable with elavil--he noted gerd symptoms are stable without dysphagia--bp remains stable without cp or ha--he is toelraging meds for hyperlpidemia      Current Outpatient Prescriptions:   •  amitriptyline (ELAVIL) 25 MG tablet, Take 1 tablet by mouth Every Night., Disp: 90 tablet, Rfl: 1  •  amLODIPine (NORVASC) 5 MG tablet, Take 1 tablet by mouth Daily., Disp: 90 tablet, Rfl: 1  •  aspirin 81 MG chewable tablet, Take 81 mg by mouth daily., Disp: , Rfl:   •  esomeprazole (nexIUM) 40 MG capsule, TAKE 1 CAPSULE BY MOUTH DAILY, Disp: 90 capsule, Rfl: 0  •  ezetimibe (ZETIA) 10 MG tablet, Take 1 tablet by mouth Daily., Disp: 90 tablet, Rfl: 1  •  metoprolol tartrate (LOPRESSOR) 50 MG tablet, Take 1 tablet by mouth 2 (Two) Times a Day., Disp: 180 tablet, Rfl: 1  •  sildenafil (VIAGRA) 100 MG tablet, Take 100 mg by mouth as needed for Erectile Dysfunction., Disp: , Rfl:   No Known Allergies    Past Medical History:   Diagnosis Date   • Diverticulosis    • Erectile disorder due to 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    • GERD (gastroesophageal reflux disease)    • Hyperlipidemia    • Hypertension    • Insomnia      Past Surgical History:   Procedure Laterality Date   • CHOLECYSTECTOMY         Review of Systems   Constitutional: Negative.    HENT: Negative.    Eyes: Negative.    Respiratory: Negative.    Cardiovascular: Negative.    Gastrointestinal: Negative.    Endocrine: Negative.    Genitourinary: Positive for frequency.   Musculoskeletal: Negative.    Skin: Negative.    Allergic/Immunologic: Negative.    Neurological: Negative.    Hematological: Negative.   "  Psychiatric/Behavioral: Negative.        Objective  /88  Pulse 58  Resp 16  Ht 170.2 cm (67\")  Wt 89.4 kg (197 lb)  SpO2 98%  BMI 30.85 kg/m2  Physical Exam   Constitutional: He is oriented to person, place, and time. He appears well-developed and well-nourished.   HENT:   Head: Normocephalic and atraumatic.   Right Ear: External ear normal.   Left Ear: External ear normal.   Nose: Nose normal.   Mouth/Throat: Oropharynx is clear and moist.   Eyes: Conjunctivae and EOM are normal. Pupils are equal, round, and reactive to light.   Neck: Normal range of motion. Neck supple.   Cardiovascular: Normal rate, regular rhythm, normal heart sounds and intact distal pulses.    Pulmonary/Chest: Effort normal and breath sounds normal.   Abdominal: Soft. Bowel sounds are normal.   Musculoskeletal: Normal range of motion.   Neurological: He is alert and oriented to person, place, and time. He has normal reflexes.   Skin: Skin is warm and dry.   Psychiatric: He has a normal mood and affect. His behavior is normal. Judgment and thought content normal.   Nursing note and vitals reviewed.      Assessment/Plan   Nehemiah was seen today for follow-up.    Diagnoses and all orders for this visit:    Essential hypertension    Mixed hyperlipidemia    GERD without esophagitis    Erectile dysfunction, unspecified erectile dysfunction type    Primary insomnia        He says  His colon is up to date  Patient's BMI is above normal parameters. Follow-up plan includes:  exercise counseling and nutrition counseling.     we reviewed his labs together--will see dr choi later this year    No orders of the defined types were placed in this encounter.    Current outpatient and discharge medications have been reconciled for the patient.  Erwin Ruelas MDFollow up: 6 month(s)  "

## 2018-02-27 DIAGNOSIS — R97.20 ELEVATED PROSTATE SPECIFIC ANTIGEN (PSA): Primary | ICD-10-CM

## 2018-02-27 LAB — PSA SERPL-MCNC: 4.24 NG/ML (ref 0–4)

## 2018-03-05 ENCOUNTER — OFFICE VISIT (OUTPATIENT)
Dept: UROLOGY | Facility: CLINIC | Age: 73
End: 2018-03-05

## 2018-03-05 VITALS — WEIGHT: 199 LBS | BODY MASS INDEX: 31.98 KG/M2 | HEIGHT: 66 IN | TEMPERATURE: 97.4 F

## 2018-03-05 DIAGNOSIS — N40.1 BENIGN PROSTATIC HYPERPLASIA WITH LOWER URINARY TRACT SYMPTOMS, SYMPTOM DETAILS UNSPECIFIED: Primary | ICD-10-CM

## 2018-03-05 DIAGNOSIS — R97.20 ELEVATED PROSTATE SPECIFIC ANTIGEN (PSA): ICD-10-CM

## 2018-03-05 LAB
BILIRUB BLD-MCNC: NEGATIVE MG/DL
CLARITY, POC: CLEAR
COLOR UR: YELLOW
GLUCOSE UR STRIP-MCNC: NEGATIVE MG/DL
KETONES UR QL: NEGATIVE
LEUKOCYTE EST, POC: NEGATIVE
NITRITE UR-MCNC: NEGATIVE MG/ML
PH UR: 7 [PH] (ref 5–8)
PROT UR STRIP-MCNC: NEGATIVE MG/DL
RBC # UR STRIP: NEGATIVE /UL
SP GR UR: 1.01 (ref 1–1.03)
UROBILINOGEN UR QL: NORMAL

## 2018-03-05 PROCEDURE — 99213 OFFICE O/P EST LOW 20 MIN: CPT | Performed by: UROLOGY

## 2018-03-05 PROCEDURE — 81001 URINALYSIS AUTO W/SCOPE: CPT | Performed by: UROLOGY

## 2018-03-05 NOTE — PROGRESS NOTES
Subjective    Mr. Gomez is 72 y.o. male    CHIEF COMPLAINT: BPH    The patient comes back today for follow-up of BPH clinically he is doing well he remains asymptomatic from a voiding point review he is not on any medications he denies any gross hematuria there is no flank pain no real burning stinging urgency frequency etc.        He also has a history of elevated PSA he has not had a biopsy but his PSA continues to decrease in today and is 4.2 which for his age is minimally if any elevated.    Once again I discussed the options with her regarding management I think he does not want to undergo a biopsy although I think he understands that I cannot guarantee that he does not have prostate cancer but certainly PSA continues to decrease      History of Present Illness      The following portions of the patient's history were reviewed and updated as appropriate: allergies, current medications, past family history, past medical history, past social history, past surgical history and problem list.    Review of Systems   Constitutional: Negative.  Negative for chills and fever.   Gastrointestinal: Negative for abdominal distention, abdominal pain, anal bleeding, blood in stool and nausea.   Genitourinary: Negative for decreased urine volume, difficulty urinating, discharge, dysuria, enuresis, flank pain, frequency, genital sores, hematuria, penile pain, penile swelling, scrotal swelling, testicular pain and urgency.   Psychiatric/Behavioral: Negative.  Negative for agitation and confusion.         Current Outpatient Prescriptions:   •  amitriptyline (ELAVIL) 25 MG tablet, Take 1 tablet by mouth Every Night., Disp: 90 tablet, Rfl: 1  •  amLODIPine (NORVASC) 5 MG tablet, Take 1 tablet by mouth Daily., Disp: 90 tablet, Rfl: 1  •  aspirin 81 MG chewable tablet, Take 81 mg by mouth daily., Disp: , Rfl:   •  esomeprazole (nexIUM) 40 MG capsule, TAKE 1 CAPSULE BY MOUTH DAILY, Disp: 90 capsule, Rfl: 0  •  ezetimibe (ZETIA) 10 MG  "tablet, Take 1 tablet by mouth Daily., Disp: 90 tablet, Rfl: 1  •  metoprolol tartrate (LOPRESSOR) 50 MG tablet, Take 1 tablet by mouth 2 (Two) Times a Day., Disp: 180 tablet, Rfl: 1  •  sildenafil (VIAGRA) 100 MG tablet, Take 100 mg by mouth as needed for Erectile Dysfunction., Disp: , Rfl:     Past Medical History:   Diagnosis Date   • Diverticulosis    • Erectile disorder due to 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    • GERD (gastroesophageal reflux disease)    • Hyperlipidemia    • Hypertension    • Insomnia        Past Surgical History:   Procedure Laterality Date   • CHOLECYSTECTOMY         Social History     Social History   • Marital status:      Occupational History   • retired      Social History Main Topics   • Smoking status: Former Smoker   • Alcohol use Yes   • Drug use: No       Family History   Problem Relation Age of Onset   • No Known Problems Father    • No Known Problems Mother        Objective    Temp 97.4 °F (36.3 °C)  Ht 167.6 cm (66\")  Wt 90.3 kg (199 lb)  BMI 32.12 kg/m2    Physical Exam      Orders Only on 02/27/2018   Component Date Value Ref Range Status   • PSA 02/27/2018 4.240* 0.000 - 4.000 ng/mL Final       Results for orders placed or performed in visit on 03/05/18   POC Urinalysis Dipstick, Automated   Result Value Ref Range    Color Yellow Yellow, Straw, Dark Yellow, Thu    Clarity, UA Clear Clear    Glucose, UA Negative Negative, 1000 mg/dL (3+) mg/dL    Bilirubin Negative Negative    Ketones, UA Negative Negative    Specific Gravity  1.010 1.005 - 1.030    Blood, UA Negative Negative    pH, Urine 7.0 5.0 - 8.0    Protein, POC Negative Negative mg/dL    Urobilinogen, UA Normal Normal    Leukocytes Negative Negative    Nitrite, UA Negative Negative       Assessment and Plan    Diagnoses and all orders for this visit:    Benign prostatic hyperplasia with lower urinary tract symptoms, symptom details unspecified  -     POC Urinalysis Dipstick, Automated    Elevated prostate " specific antigen (PSA)  -     PSA DIAGNOSTIC; Future    Plan--as noted above I discussed the elevated PSA with the patient again he is comfortable with observation only and I think he understands my concerns.    On was seen back again in 1 year's time a PSA he will call sooner as needed    From a BPH point review if his symptoms should worsen he will let me know

## 2018-03-10 ENCOUNTER — RESULTS ENCOUNTER (OUTPATIENT)
Dept: UROLOGY | Facility: CLINIC | Age: 73
End: 2018-03-10

## 2018-03-10 DIAGNOSIS — R97.20 ELEVATED PROSTATE SPECIFIC ANTIGEN (PSA): ICD-10-CM

## 2018-03-18 RX ORDER — METOPROLOL TARTRATE 50 MG/1
TABLET, FILM COATED ORAL
Qty: 180 TABLET | Refills: 0 | Status: CANCELLED | OUTPATIENT
Start: 2018-03-18

## 2018-03-20 RX ORDER — METOPROLOL TARTRATE 50 MG/1
50 TABLET, FILM COATED ORAL EVERY 12 HOURS SCHEDULED
Qty: 180 TABLET | Refills: 1 | Status: SHIPPED | OUTPATIENT
Start: 2018-03-20 | End: 2018-09-16 | Stop reason: SDUPTHER

## 2018-03-20 RX ORDER — AMITRIPTYLINE HYDROCHLORIDE 25 MG/1
25 TABLET, FILM COATED ORAL NIGHTLY
Qty: 90 TABLET | Refills: 1 | Status: SHIPPED | OUTPATIENT
Start: 2018-03-20 | End: 2018-04-02 | Stop reason: DRUGHIGH

## 2018-04-02 ENCOUNTER — TELEPHONE (OUTPATIENT)
Dept: FAMILY MEDICINE CLINIC | Facility: CLINIC | Age: 73
End: 2018-04-02

## 2018-04-02 RX ORDER — AMITRIPTYLINE HYDROCHLORIDE 50 MG/1
50 TABLET, FILM COATED ORAL NIGHTLY
Qty: 90 TABLET | Refills: 1 | Status: SHIPPED | OUTPATIENT
Start: 2018-04-02 | End: 2018-10-15 | Stop reason: SDUPTHER

## 2018-04-27 RX ORDER — AMLODIPINE BESYLATE 5 MG/1
5 TABLET ORAL DAILY
Qty: 90 TABLET | Refills: 1 | Status: SHIPPED | OUTPATIENT
Start: 2018-04-27 | End: 2018-10-24 | Stop reason: SDUPTHER

## 2018-04-27 RX ORDER — EZETIMIBE 10 MG/1
10 TABLET ORAL DAILY
Qty: 90 TABLET | Refills: 1 | Status: SHIPPED | OUTPATIENT
Start: 2018-04-27 | End: 2018-10-24 | Stop reason: SDUPTHER

## 2018-05-07 RX ORDER — ESOMEPRAZOLE MAGNESIUM 40 MG/1
CAPSULE, DELAYED RELEASE ORAL
Qty: 90 CAPSULE | Refills: 0 | Status: SHIPPED | OUTPATIENT
Start: 2018-05-07 | End: 2018-08-09 | Stop reason: SDUPTHER

## 2018-08-09 RX ORDER — ESOMEPRAZOLE MAGNESIUM 40 MG/1
CAPSULE, DELAYED RELEASE ORAL
Qty: 90 CAPSULE | Refills: 0 | Status: SHIPPED | OUTPATIENT
Start: 2018-08-09 | End: 2018-11-11 | Stop reason: SDUPTHER

## 2018-08-17 DIAGNOSIS — E78.5 HYPERLIPIDEMIA, UNSPECIFIED HYPERLIPIDEMIA TYPE: ICD-10-CM

## 2018-08-17 DIAGNOSIS — I10 ESSENTIAL HYPERTENSION: Primary | ICD-10-CM

## 2018-08-17 DIAGNOSIS — R97.20 ELEVATED PSA: ICD-10-CM

## 2018-08-17 LAB
ALBUMIN SERPL-MCNC: 4 G/DL (ref 3.5–5)
ALBUMIN/GLOB SERPL: 1.5 G/DL (ref 1.1–2.5)
ALP SERPL-CCNC: 107 U/L (ref 24–120)
ALT SERPL-CCNC: 28 U/L (ref 0–54)
AST SERPL-CCNC: 25 U/L (ref 7–45)
BASOPHILS # BLD AUTO: 0.05 10*3/MM3 (ref 0–0.2)
BASOPHILS NFR BLD AUTO: 1.2 % (ref 0–2)
BILIRUB SERPL-MCNC: 0.3 MG/DL (ref 0.1–1)
BUN SERPL-MCNC: 24 MG/DL (ref 5–21)
BUN/CREAT SERPL: 21.8 (ref 7–25)
CALCIUM SERPL-MCNC: 8.7 MG/DL (ref 8.4–10.4)
CHLORIDE SERPL-SCNC: 104 MMOL/L (ref 98–110)
CHOLEST SERPL-MCNC: 175 MG/DL (ref 130–200)
CO2 SERPL-SCNC: 28 MMOL/L (ref 24–31)
CREAT SERPL-MCNC: 1.1 MG/DL (ref 0.5–1.4)
EOSINOPHIL # BLD AUTO: 0.18 10*3/MM3 (ref 0–0.7)
EOSINOPHIL NFR BLD AUTO: 4.2 % (ref 0–4)
ERYTHROCYTE [DISTWIDTH] IN BLOOD BY AUTOMATED COUNT: 12.7 % (ref 12–15)
GLOBULIN SER CALC-MCNC: 2.7 GM/DL
GLUCOSE SERPL-MCNC: 107 MG/DL (ref 70–100)
HCT VFR BLD AUTO: 45.1 % (ref 40–52)
HDLC SERPL-MCNC: 37 MG/DL
HGB BLD-MCNC: 15 G/DL (ref 14–18)
IMM GRANULOCYTES # BLD: 0.02 10*3/MM3 (ref 0–0.03)
IMM GRANULOCYTES NFR BLD: 0.5 % (ref 0–5)
LDLC SERPL CALC-MCNC: 107 MG/DL (ref 0–99)
LYMPHOCYTES # BLD AUTO: 1.18 10*3/MM3 (ref 0.72–4.86)
LYMPHOCYTES NFR BLD AUTO: 27.4 % (ref 15–45)
MCH RBC QN AUTO: 29.5 PG (ref 28–32)
MCHC RBC AUTO-ENTMCNC: 33.3 G/DL (ref 33–36)
MCV RBC AUTO: 88.8 FL (ref 82–95)
MONOCYTES # BLD AUTO: 0.47 10*3/MM3 (ref 0.19–1.3)
MONOCYTES NFR BLD AUTO: 10.9 % (ref 4–12)
NEUTROPHILS # BLD AUTO: 2.41 10*3/MM3 (ref 1.87–8.4)
NEUTROPHILS NFR BLD AUTO: 55.8 % (ref 39–78)
NRBC BLD AUTO-RTO: 0 /100 WBC (ref 0–0)
PLATELET # BLD AUTO: 189 10*3/MM3 (ref 130–400)
POTASSIUM SERPL-SCNC: 4 MMOL/L (ref 3.5–5.3)
PROT SERPL-MCNC: 6.7 G/DL (ref 6.3–8.7)
PSA SERPL-MCNC: 4.73 NG/ML (ref 0–4)
RBC # BLD AUTO: 5.08 10*6/MM3 (ref 4.8–5.9)
SODIUM SERPL-SCNC: 140 MMOL/L (ref 135–145)
TRIGL SERPL-MCNC: 154 MG/DL (ref 0–149)
TSH SERPL DL<=0.005 MIU/L-ACNC: 1.84 MIU/ML (ref 0.47–4.68)
VLDLC SERPL CALC-MCNC: 30.8 MG/DL
WBC # BLD AUTO: 4.31 10*3/MM3 (ref 4.8–10.8)

## 2018-08-20 NOTE — PROGRESS NOTES
Pt informed all labs are stable, but PSA is up a bit. He says he hasn't made an appt yet, but suppose to see him after the 1st of the year.

## 2018-08-21 ENCOUNTER — OFFICE VISIT (OUTPATIENT)
Dept: FAMILY MEDICINE CLINIC | Facility: CLINIC | Age: 73
End: 2018-08-21

## 2018-08-21 VITALS
OXYGEN SATURATION: 98 % | SYSTOLIC BLOOD PRESSURE: 126 MMHG | RESPIRATION RATE: 16 BRPM | BODY MASS INDEX: 30.53 KG/M2 | HEART RATE: 76 BPM | WEIGHT: 190 LBS | HEIGHT: 66 IN | DIASTOLIC BLOOD PRESSURE: 86 MMHG

## 2018-08-21 DIAGNOSIS — I10 ESSENTIAL HYPERTENSION: Primary | ICD-10-CM

## 2018-08-21 DIAGNOSIS — E78.2 MIXED HYPERLIPIDEMIA: ICD-10-CM

## 2018-08-21 DIAGNOSIS — N52.9 ERECTILE DYSFUNCTION, UNSPECIFIED ERECTILE DYSFUNCTION TYPE: ICD-10-CM

## 2018-08-21 DIAGNOSIS — K21.9 GERD WITHOUT ESOPHAGITIS: ICD-10-CM

## 2018-08-21 PROCEDURE — 99214 OFFICE O/P EST MOD 30 MIN: CPT | Performed by: FAMILY MEDICINE

## 2018-08-21 NOTE — PROGRESS NOTES
"Subjective   Nehemiah Gomez is a 72 y.o. male.     Chief Complaint   Patient presents with   • Follow-up     6 mo   htn       History of Present Illness     he notes good bp contdrol without cp or ha---his gerd symptoms are contolled witout dysphagia--his ed is effectively controlled with viagra--      Current Outpatient Prescriptions:   •  amitriptyline (ELAVIL) 50 MG tablet, Take 1 tablet by mouth Every Night., Disp: 90 tablet, Rfl: 1  •  amLODIPine (NORVASC) 5 MG tablet, TAKE 1 TABLET BY MOUTH DAILY, Disp: 90 tablet, Rfl: 1  •  aspirin 81 MG chewable tablet, Take 81 mg by mouth daily., Disp: , Rfl:   •  esomeprazole (nexIUM) 40 MG capsule, TAKE 1 CAPSULE BY MOUTH DAILY, Disp: 90 capsule, Rfl: 0  •  ezetimibe (ZETIA) 10 MG tablet, TAKE 1 TABLET BY MOUTH DAILY, Disp: 90 tablet, Rfl: 1  •  metoprolol tartrate (LOPRESSOR) 50 MG tablet, Take 1 tablet by mouth Every 12 (Twelve) Hours., Disp: 180 tablet, Rfl: 1  •  sildenafil (VIAGRA) 100 MG tablet, Take 100 mg by mouth as needed for Erectile Dysfunction., Disp: , Rfl:   No Known Allergies    Past Medical History:   Diagnosis Date   • Diverticulosis    • Erectile disorder due to 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    • GERD (gastroesophageal reflux disease)    • Hyperlipidemia    • Hypertension    • Insomnia      Past Surgical History:   Procedure Laterality Date   • CHOLECYSTECTOMY         Review of Systems   Constitutional: Negative.    HENT: Negative.    Eyes: Negative.    Respiratory: Negative.    Cardiovascular: Negative.    Gastrointestinal: Negative.    Endocrine: Negative.    Genitourinary: Negative.    Musculoskeletal: Negative.    Skin: Negative.    Allergic/Immunologic: Negative.    Neurological: Negative.    Hematological: Negative.    Psychiatric/Behavioral: Negative.        Objective  /86   Pulse 76   Resp 16   Ht 167.6 cm (66\")   Wt 86.2 kg (190 lb)   SpO2 98%   BMI 30.67 kg/m²   Physical Exam   Constitutional: He is oriented to person, place, " and time. He appears well-developed and well-nourished.   HENT:   Head: Normocephalic and atraumatic.   Right Ear: External ear normal.   Left Ear: External ear normal.   Nose: Nose normal.   Mouth/Throat: Oropharynx is clear and moist.   Eyes: Pupils are equal, round, and reactive to light. Conjunctivae and EOM are normal.   Neck: Normal range of motion. Neck supple.   Cardiovascular: Normal rate, regular rhythm, normal heart sounds and intact distal pulses.    Pulmonary/Chest: Effort normal and breath sounds normal.   Abdominal: Soft. Bowel sounds are normal.   Musculoskeletal: Normal range of motion.   Neurological: He is alert and oriented to person, place, and time.   Skin: Skin is warm. Capillary refill takes less than 2 seconds.   Psychiatric: He has a normal mood and affect. His behavior is normal. Judgment and thought content normal.   Vitals reviewed.      Assessment/Plan   Nehemiah was seen today for follow-up.    Diagnoses and all orders for this visit:    Essential hypertension    Mixed hyperlipidemia    GERD without esophagitis    Erectile dysfunction, unspecified erectile dysfunction type      We discussed his elevated psa--he has appt with dr choi in jan and declines us making a sooner appt than that date           No orders of the defined types were placed in this encounter.      Follow up: 6 month(s)

## 2018-09-17 RX ORDER — METOPROLOL TARTRATE 50 MG/1
TABLET, FILM COATED ORAL
Qty: 180 TABLET | Refills: 1 | Status: SHIPPED | OUTPATIENT
Start: 2018-09-17 | End: 2019-01-03 | Stop reason: SDUPTHER

## 2018-10-15 RX ORDER — AMITRIPTYLINE HYDROCHLORIDE 25 MG/1
25 TABLET, FILM COATED ORAL NIGHTLY
Qty: 90 TABLET | Refills: 3 | Status: SHIPPED | OUTPATIENT
Start: 2018-10-15 | End: 2019-01-03 | Stop reason: SDUPTHER

## 2018-10-15 RX ORDER — AMITRIPTYLINE HYDROCHLORIDE 25 MG/1
25 TABLET, FILM COATED ORAL NIGHTLY
Qty: 90 TABLET | Refills: 1 | Status: SHIPPED | OUTPATIENT
Start: 2018-10-15 | End: 2018-10-15 | Stop reason: SDUPTHER

## 2018-10-15 RX ORDER — AMITRIPTYLINE HYDROCHLORIDE 50 MG/1
50 TABLET, FILM COATED ORAL NIGHTLY
Qty: 90 TABLET | Refills: 1 | Status: SHIPPED | OUTPATIENT
Start: 2018-10-15 | End: 2018-10-15

## 2018-10-24 RX ORDER — AMLODIPINE BESYLATE 5 MG/1
5 TABLET ORAL DAILY
Qty: 90 TABLET | Refills: 0 | Status: SHIPPED | OUTPATIENT
Start: 2018-10-24 | End: 2019-01-03 | Stop reason: SDUPTHER

## 2018-10-24 RX ORDER — EZETIMIBE 10 MG/1
10 TABLET ORAL DAILY
Qty: 90 TABLET | Refills: 0 | Status: SHIPPED | OUTPATIENT
Start: 2018-10-24 | End: 2019-01-03 | Stop reason: SDUPTHER

## 2018-11-12 RX ORDER — ESOMEPRAZOLE MAGNESIUM 40 MG/1
CAPSULE, DELAYED RELEASE ORAL
Qty: 90 CAPSULE | Refills: 3 | Status: SHIPPED | OUTPATIENT
Start: 2018-11-12 | End: 2019-01-03 | Stop reason: SDUPTHER

## 2019-01-03 RX ORDER — ESOMEPRAZOLE MAGNESIUM 40 MG/1
40 CAPSULE, DELAYED RELEASE ORAL DAILY
Qty: 90 CAPSULE | Refills: 1 | Status: SHIPPED | OUTPATIENT
Start: 2019-01-03 | End: 2019-07-08 | Stop reason: SDUPTHER

## 2019-01-03 RX ORDER — EZETIMIBE 10 MG/1
10 TABLET ORAL DAILY
Qty: 90 TABLET | Refills: 1 | Status: SHIPPED | OUTPATIENT
Start: 2019-01-03 | End: 2019-01-21 | Stop reason: SDUPTHER

## 2019-01-03 RX ORDER — METOPROLOL TARTRATE 50 MG/1
50 TABLET, FILM COATED ORAL 2 TIMES DAILY
Qty: 180 TABLET | Refills: 1 | Status: SHIPPED | OUTPATIENT
Start: 2019-01-03 | End: 2019-07-08 | Stop reason: SDUPTHER

## 2019-01-03 RX ORDER — AMLODIPINE BESYLATE 5 MG/1
5 TABLET ORAL DAILY
Qty: 90 TABLET | Refills: 1 | Status: SHIPPED | OUTPATIENT
Start: 2019-01-03 | End: 2019-02-19

## 2019-01-03 RX ORDER — AMITRIPTYLINE HYDROCHLORIDE 25 MG/1
25 TABLET, FILM COATED ORAL NIGHTLY
Qty: 90 TABLET | Refills: 1 | Status: SHIPPED | OUTPATIENT
Start: 2019-01-03 | End: 2019-07-08 | Stop reason: SDUPTHER

## 2019-01-21 RX ORDER — EZETIMIBE 10 MG/1
10 TABLET ORAL DAILY
Qty: 90 TABLET | Refills: 0 | Status: SHIPPED | OUTPATIENT
Start: 2019-01-21 | End: 2019-07-08 | Stop reason: SDUPTHER

## 2019-01-21 RX ORDER — AMLODIPINE BESYLATE 5 MG/1
5 TABLET ORAL DAILY
Qty: 90 TABLET | Refills: 0 | Status: SHIPPED | OUTPATIENT
Start: 2019-01-21 | End: 2019-07-08 | Stop reason: SDUPTHER

## 2019-02-11 DIAGNOSIS — E78.2 MIXED HYPERLIPIDEMIA: ICD-10-CM

## 2019-02-11 DIAGNOSIS — I10 ESSENTIAL HYPERTENSION: Primary | ICD-10-CM

## 2019-02-12 ENCOUNTER — RESULTS ENCOUNTER (OUTPATIENT)
Dept: FAMILY MEDICINE CLINIC | Facility: CLINIC | Age: 74
End: 2019-02-12

## 2019-02-12 DIAGNOSIS — E78.2 MIXED HYPERLIPIDEMIA: ICD-10-CM

## 2019-02-12 DIAGNOSIS — I10 ESSENTIAL HYPERTENSION: ICD-10-CM

## 2019-02-12 LAB
ALBUMIN SERPL-MCNC: 4 G/DL (ref 3.5–5)
ALBUMIN/GLOB SERPL: 1.4 G/DL (ref 1.1–2.5)
ALP SERPL-CCNC: 82 U/L (ref 24–120)
ALT SERPL-CCNC: 26 U/L (ref 0–54)
AST SERPL-CCNC: 25 U/L (ref 7–45)
BASOPHILS # BLD AUTO: 0.07 10*3/MM3 (ref 0–0.2)
BASOPHILS NFR BLD AUTO: 1.4 % (ref 0–2)
BILIRUB SERPL-MCNC: 0.5 MG/DL (ref 0.1–1)
BUN SERPL-MCNC: 20 MG/DL (ref 5–21)
BUN/CREAT SERPL: 18.5 (ref 7–25)
CALCIUM SERPL-MCNC: 9 MG/DL (ref 8.4–10.4)
CHLORIDE SERPL-SCNC: 103 MMOL/L (ref 98–110)
CHOLEST SERPL-MCNC: 174 MG/DL (ref 130–200)
CHOLEST/HDLC SERPL: 4.35 {RATIO}
CO2 SERPL-SCNC: 29 MMOL/L (ref 24–31)
CREAT SERPL-MCNC: 1.08 MG/DL (ref 0.5–1.4)
EOSINOPHIL # BLD AUTO: 0.17 10*3/MM3 (ref 0–0.7)
EOSINOPHIL NFR BLD AUTO: 3.3 % (ref 0–4)
ERYTHROCYTE [DISTWIDTH] IN BLOOD BY AUTOMATED COUNT: 12.3 % (ref 12–15)
GLOBULIN SER CALC-MCNC: 2.8 GM/DL
GLUCOSE SERPL-MCNC: 102 MG/DL (ref 70–100)
HCT VFR BLD AUTO: 44.7 % (ref 40–52)
HDLC SERPL-MCNC: 40 MG/DL
HGB BLD-MCNC: 15.3 G/DL (ref 14–18)
IMM GRANULOCYTES # BLD AUTO: 0.02 10*3/MM3 (ref 0–0.05)
IMM GRANULOCYTES NFR BLD AUTO: 0.4 % (ref 0–5)
LDLC SERPL CALC-MCNC: 110 MG/DL (ref 0–99)
LYMPHOCYTES # BLD AUTO: 1.22 10*3/MM3 (ref 0.72–4.86)
LYMPHOCYTES NFR BLD AUTO: 24 % (ref 15–45)
MCH RBC QN AUTO: 29.4 PG (ref 28–32)
MCHC RBC AUTO-ENTMCNC: 34.2 G/DL (ref 33–36)
MCV RBC AUTO: 86 FL (ref 82–95)
MONOCYTES # BLD AUTO: 0.52 10*3/MM3 (ref 0.19–1.3)
MONOCYTES NFR BLD AUTO: 10.2 % (ref 4–12)
NEUTROPHILS # BLD AUTO: 3.08 10*3/MM3 (ref 1.87–8.4)
NEUTROPHILS NFR BLD AUTO: 60.7 % (ref 39–78)
NRBC BLD AUTO-RTO: 0 /100 WBC (ref 0–0)
PLATELET # BLD AUTO: 208 10*3/MM3 (ref 130–400)
POTASSIUM SERPL-SCNC: 4.4 MMOL/L (ref 3.5–5.3)
PROT SERPL-MCNC: 6.8 G/DL (ref 6.3–8.7)
RBC # BLD AUTO: 5.2 10*6/MM3 (ref 4.8–5.9)
SODIUM SERPL-SCNC: 140 MMOL/L (ref 135–145)
TRIGL SERPL-MCNC: 121 MG/DL (ref 0–149)
TSH SERPL DL<=0.005 MIU/L-ACNC: 1.38 MIU/ML (ref 0.47–4.68)
VLDLC SERPL CALC-MCNC: 24.2 MG/DL
WBC # BLD AUTO: 5.08 10*3/MM3 (ref 4.8–10.8)

## 2019-02-19 ENCOUNTER — OFFICE VISIT (OUTPATIENT)
Dept: FAMILY MEDICINE CLINIC | Facility: CLINIC | Age: 74
End: 2019-02-19

## 2019-02-19 VITALS
HEART RATE: 67 BPM | BODY MASS INDEX: 30.7 KG/M2 | DIASTOLIC BLOOD PRESSURE: 80 MMHG | SYSTOLIC BLOOD PRESSURE: 120 MMHG | HEIGHT: 66 IN | WEIGHT: 191 LBS | RESPIRATION RATE: 16 BRPM | OXYGEN SATURATION: 97 %

## 2019-02-19 DIAGNOSIS — I10 ESSENTIAL HYPERTENSION: Primary | ICD-10-CM

## 2019-02-19 DIAGNOSIS — N52.9 ERECTILE DYSFUNCTION, UNSPECIFIED ERECTILE DYSFUNCTION TYPE: ICD-10-CM

## 2019-02-19 DIAGNOSIS — E78.2 MIXED HYPERLIPIDEMIA: ICD-10-CM

## 2019-02-19 DIAGNOSIS — K21.9 GERD WITHOUT ESOPHAGITIS: ICD-10-CM

## 2019-02-19 LAB — PSA SERPL-MCNC: 6.13 NG/ML (ref 0–4)

## 2019-02-19 PROCEDURE — 99214 OFFICE O/P EST MOD 30 MIN: CPT | Performed by: FAMILY MEDICINE

## 2019-02-19 PROCEDURE — G0439 PPPS, SUBSEQ VISIT: HCPCS | Performed by: FAMILY MEDICINE

## 2019-02-19 NOTE — PROGRESS NOTES
"Subjective   Nehemiah Gomez is a 73 y.o. male.     Chief Complaint   Patient presents with   • Follow-up     6 mo   htn       History of Present Illness     he notes good bp control witout cp or ha---his gerd syjmtoms are contdrolled without dysphagia0---viagra works for ed for him---he is toleratging meds for lipid disorder      Current Outpatient Medications:   •  amitriptyline (ELAVIL) 25 MG tablet, Take 1 tablet by mouth Every Night., Disp: 90 tablet, Rfl: 1  •  amLODIPine (NORVASC) 5 MG tablet, TAKE 1 TABLET BY MOUTH DAILY, Disp: 90 tablet, Rfl: 0  •  aspirin 81 MG chewable tablet, Take 81 mg by mouth daily., Disp: , Rfl:   •  esomeprazole (nexIUM) 40 MG capsule, Take 1 capsule by mouth Daily., Disp: 90 capsule, Rfl: 1  •  ezetimibe (ZETIA) 10 MG tablet, TAKE 1 TABLET BY MOUTH DAILY, Disp: 90 tablet, Rfl: 0  •  metoprolol tartrate (LOPRESSOR) 50 MG tablet, Take 1 tablet by mouth 2 (Two) Times a Day., Disp: 180 tablet, Rfl: 1  •  sildenafil (VIAGRA) 100 MG tablet, Take 100 mg by mouth as needed for Erectile Dysfunction., Disp: , Rfl:   No Known Allergies    Past Medical History:   Diagnosis Date   • Diverticulosis    • Erectile disorder due to 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    • GERD (gastroesophageal reflux disease)    • Hyperlipidemia    • Hypertension    • Insomnia      Past Surgical History:   Procedure Laterality Date   • CHOLECYSTECTOMY         Review of Systems   Constitutional: Negative.    HENT: Negative.    Eyes: Negative.    Respiratory: Negative.    Cardiovascular: Negative.    Gastrointestinal: Negative.    Endocrine: Negative.    Genitourinary: Negative.    Musculoskeletal: Negative.    Skin: Negative.    Allergic/Immunologic: Negative.    Neurological: Negative.    Hematological: Negative.    Psychiatric/Behavioral: Negative.        Objective  /80   Pulse 67   Resp 16   Ht 167.6 cm (66\")   Wt 86.6 kg (191 lb)   SpO2 97%   BMI 30.83 kg/m²   Physical Exam   Constitutional: He is " oriented to person, place, and time. He appears well-developed and well-nourished.   HENT:   Head: Normocephalic and atraumatic.   Right Ear: External ear normal.   Left Ear: External ear normal.   Nose: Nose normal.   Mouth/Throat: Oropharynx is clear and moist.   Eyes: Conjunctivae and EOM are normal. Pupils are equal, round, and reactive to light.   Neck: Normal range of motion. Neck supple.   Cardiovascular: Normal rate, regular rhythm, normal heart sounds and intact distal pulses.   Pulmonary/Chest: Effort normal and breath sounds normal.   Abdominal: Soft. Bowel sounds are normal.   Musculoskeletal: Normal range of motion.   Neurological: He is alert and oriented to person, place, and time.   Skin: Skin is warm. Capillary refill takes less than 2 seconds.   Psychiatric: He has a normal mood and affect. His behavior is normal. Judgment and thought content normal.   Nursing note and vitals reviewed.      Assessment/Plan   Nehemiah was seen today for follow-up.    Diagnoses and all orders for this visit:    Essential hypertension    Mixed hyperlipidemia    GERD without esophagitis    Erectile dysfunction, unspecified erectile dysfunction type        We notes his labs and d/w with him  He says his colon is up to date  Patient's Body mass index is 30.83 kg/m². BMI is within normal parameters. No follow-up required..       No orders of the defined types were placed in this encounter.    Current outpatient and discharge medications have been reconciled for the patient.  Reviewed by: Erwin Ruelas MD  Follow up: 6 month(s)

## 2019-02-19 NOTE — PROGRESS NOTES
QUICK REFERENCE INFORMATION:  The ABCs of the Annual Wellness Visit    Subsequent Medicare Wellness Visit    HEALTH RISK ASSESSMENT    1945    Recent Hospitalizations:  No hospitalization(s) within the last year..        Current Medical Providers:  Patient Care Team:  Erwin Ruelas MD as PCP - General  Erwin Ruelas MD as PCP - Claims Attributed  Davion Moreno MD as Consulting Physician (Urology)        Smoking Status:  Social History     Tobacco Use   Smoking Status Former Smoker       Alcohol Consumption:  Social History     Substance and Sexual Activity   Alcohol Use Yes       Depression Screen:   PHQ-2/PHQ-9 Depression Screening 2/19/2019   Little interest or pleasure in doing things 0   Feeling down, depressed, or hopeless 0   Trouble falling or staying asleep, or sleeping too much 0   Feeling tired or having little energy 0   Poor appetite or overeating 0   Feeling bad about yourself - or that you are a failure or have let yourself or your family down 0   Trouble concentrating on things, such as reading the newspaper or watching television 0   Moving or speaking so slowly that other people could have noticed. Or the opposite - being so fidgety or restless that you have been moving around a lot more than usual 0   Thoughts that you would be better off dead, or of hurting yourself in some way 0   Total Score 0   If you checked off any problems, how difficult have these problems made it for you to do your work, take care of things at home, or get along with other people? Not difficult at all       Health Habits and Functional and Cognitive Screening:  Functional & Cognitive Status 2/19/2019   Do you have difficulty preparing food and eating? No   Do you have difficulty bathing yourself, getting dressed or grooming yourself? No   Do you have difficulty using the toilet? No   Do you have difficulty moving around from place to place? No   Do you have trouble with steps or getting out of  a bed or a chair? No   In the past year have you fallen or experienced a near fall? No   Current Diet Well Balanced Diet   Dental Exam Up to date   Eye Exam Up to date   Exercise (times per week) 3 times per week   Current Exercise Activities Include Yard Work   Do you need help using the phone?  No   Are you deaf or do you have serious difficulty hearing?  No   Do you need help with transportation? No   Do you need help shopping? No   Do you need help preparing meals?  No   Do you need help with housework?  No   Do you need help with laundry? No   Do you need help taking your medications? No   Do you need help managing money? No   Do you ever drive or ride in a car without wearing a seat belt? No   Have you felt unusual stress, anger or loneliness in the last month? No   Who do you live with? Other   If you need help, do you have trouble finding someone available to you? No   Have you been bothered in the last four weeks by sexual problems? No   Do you have difficulty concentrating, remembering or making decisions? No           Does the patient have evidence of cognitive impairment? No    Aspirin use counseling: Taking ASA appropriately as indicated      Recent Lab Results:  CMP:  Lab Results   Component Value Date     (H) 02/12/2019    BUN 20 02/12/2019    CREATININE 1.08 02/12/2019    EGFRIFNONA 67 02/12/2019    EGFRIFAFRI 81 02/12/2019    BCR 18.5 02/12/2019     02/12/2019    K 4.4 02/12/2019    CO2 29.0 02/12/2019    CALCIUM 9.0 02/12/2019    PROTENTOTREF 6.8 02/12/2019    ALBUMIN 4.00 02/12/2019    LABGLOBREF 2.8 02/12/2019    LABIL2 1.4 02/12/2019    BILITOT 0.5 02/12/2019    ALKPHOS 82 02/12/2019    AST 25 02/12/2019    ALT 26 02/12/2019     Lipid Panel:  Lab Results   Component Value Date    TRIG 121 02/12/2019    HDL 40 02/12/2019    VLDL 24.2 02/12/2019     HbA1c:       Visual Acuity:   Visual Acuity Screening    Right eye Left eye Both eyes   Without correction:      With correction: 20/25  20/25 20/25       Age-appropriate Screening Schedule:  Refer to the list below for future screening recommendations based on patient's age, sex and/or medical conditions. Orders for these recommended tests are listed in the plan section. The patient has been provided with a written plan.    Health Maintenance   Topic Date Due   • TDAP/TD VACCINES (1 - Tdap) 12/15/1965   • PNEUMOCOCCAL VACCINES (65+ LOW/MEDIUM RISK) (2 of 2 - PPSV23) 10/21/2016   • ZOSTER VACCINE (2 of 2) 11/26/2016   • LIPID PANEL  02/12/2020   • COLONOSCOPY  03/17/2026   • INFLUENZA VACCINE  Completed        Subjective   History of Present Illness    Nehemiah Gomez is a 73 y.o. male who presents for an Subsequent Wellness Visit.    The following portions of the patient's history were reviewed and updated as appropriate: allergies, current medications, past family history, past medical history, past social history, past surgical history and problem list.    Outpatient Medications Prior to Visit   Medication Sig Dispense Refill   • amitriptyline (ELAVIL) 25 MG tablet Take 1 tablet by mouth Every Night. 90 tablet 1   • amLODIPine (NORVASC) 5 MG tablet TAKE 1 TABLET BY MOUTH DAILY 90 tablet 0   • aspirin 81 MG chewable tablet Take 81 mg by mouth daily.     • esomeprazole (nexIUM) 40 MG capsule Take 1 capsule by mouth Daily. 90 capsule 1   • ezetimibe (ZETIA) 10 MG tablet TAKE 1 TABLET BY MOUTH DAILY 90 tablet 0   • metoprolol tartrate (LOPRESSOR) 50 MG tablet Take 1 tablet by mouth 2 (Two) Times a Day. 180 tablet 1   • sildenafil (VIAGRA) 100 MG tablet Take 100 mg by mouth as needed for Erectile Dysfunction.     • amLODIPine (NORVASC) 5 MG tablet Take 1 tablet by mouth Daily. 90 tablet 1     No facility-administered medications prior to visit.        Patient Active Problem List   Diagnosis   • Hypertension   • GERD without esophagitis   • Hyperlipidemia   • Erectile dysfunction   • Elevated prostate specific antigen (PSA)   • BPH (benign prostatic  "hypertrophy) with urinary obstruction   • Cough   • Bronchitis   • Primary insomnia   • Benign prostatic hyperplasia with lower urinary tract symptoms       Advance Care Planning:  has NO advance directive - information provided to the patient today    Identification of Risk Factors:  Risk factors include: cardiovascular risk.    Review of Systems    Compared to one year ago, the patient feels his physical health is the same.  Compared to one year ago, the patient feels his mental health is the same.    Objective     Physical Exam    Vitals:    02/19/19 0914   BP: 120/80   Pulse: 67   Resp: 16   SpO2: 97%   Weight: 86.6 kg (191 lb)   Height: 167.6 cm (66\")       Patient's Body mass index is 30.83 kg/m². BMI is within normal parameters. No follow-up required..      Assessment/Plan   Patient Self-Management and Personalized Health Advice  The patient has been provided with information about: designing advance directives and preventive services including:   · Advance directive.    Visit Diagnoses:    ICD-10-CM ICD-9-CM   1. Essential hypertension I10 401.9   2. Mixed hyperlipidemia E78.2 272.2   3. GERD without esophagitis K21.9 530.81   4. Erectile dysfunction, unspecified erectile dysfunction type N52.9 607.84       No orders of the defined types were placed in this encounter.      Outpatient Encounter Medications as of 2/19/2019   Medication Sig Dispense Refill   • amitriptyline (ELAVIL) 25 MG tablet Take 1 tablet by mouth Every Night. 90 tablet 1   • amLODIPine (NORVASC) 5 MG tablet TAKE 1 TABLET BY MOUTH DAILY 90 tablet 0   • aspirin 81 MG chewable tablet Take 81 mg by mouth daily.     • esomeprazole (nexIUM) 40 MG capsule Take 1 capsule by mouth Daily. 90 capsule 1   • ezetimibe (ZETIA) 10 MG tablet TAKE 1 TABLET BY MOUTH DAILY 90 tablet 0   • metoprolol tartrate (LOPRESSOR) 50 MG tablet Take 1 tablet by mouth 2 (Two) Times a Day. 180 tablet 1   • sildenafil (VIAGRA) 100 MG tablet Take 100 mg by mouth as needed " for Erectile Dysfunction.     • [DISCONTINUED] amLODIPine (NORVASC) 5 MG tablet Take 1 tablet by mouth Daily. 90 tablet 1     No facility-administered encounter medications on file as of 2/19/2019.        Reviewed use of high risk medication in the elderly: yes  Reviewed for potential of harmful drug interactions in the elderly: yes    Follow Up:  No Follow-up on file.     An After Visit Summary and PPPS with all of these plans were given to the patient.

## 2019-02-26 ENCOUNTER — OFFICE VISIT (OUTPATIENT)
Dept: UROLOGY | Facility: CLINIC | Age: 74
End: 2019-02-26

## 2019-02-26 VITALS — HEIGHT: 67 IN | TEMPERATURE: 97.2 F | BODY MASS INDEX: 29.98 KG/M2 | WEIGHT: 191 LBS

## 2019-02-26 DIAGNOSIS — N40.1 BENIGN PROSTATIC HYPERPLASIA WITH LOWER URINARY TRACT SYMPTOMS, SYMPTOM DETAILS UNSPECIFIED: ICD-10-CM

## 2019-02-26 DIAGNOSIS — R97.20 ELEVATED PROSTATE SPECIFIC ANTIGEN (PSA): Primary | ICD-10-CM

## 2019-02-26 PROCEDURE — 81001 URINALYSIS AUTO W/SCOPE: CPT | Performed by: UROLOGY

## 2019-02-26 PROCEDURE — 99214 OFFICE O/P EST MOD 30 MIN: CPT | Performed by: UROLOGY

## 2019-02-26 NOTE — PATIENT INSTRUCTIONS

## 2019-04-17 NOTE — PROGRESS NOTES
Mr. Gomez is 73 y.o. male    CHIEF COMPLAINT: I am here for follow up on BPH. My PSA is 6.810    HPI  Elevated PSA  The patient presents with a presumed abnormality of the prostate gland, that is an elevated PSA.  This has been found in the context of Prior PSA elevation. Severity is best defined by the PSA level of 6.8 ng/ml. This test was done approximately 1week ago. Associated voiding symptom assessment reveals Frequency, Intermittency and Weakened stream. He has never undergone prostate biopsy..       Previous PSA values include :   Lab Results   Component Value Date    PSA 6.810 (H) 04/19/2019    PSA 6.130 (H) 02/19/2019    PSA 4.730 (H) 08/17/2018         Lab Results   Component Value Date    PSA 6.810 (H) 04/19/2019    PSA 6.130 (H) 02/19/2019    PSA 4.730 (H) 08/17/2018     The following portions of the patient's history were reviewed and updated as appropriate: allergies, current medications, past family history, past medical history, past social history, past surgical history and problem list.      Review of Systems   Constitutional: Negative for chills and fever.   Gastrointestinal: Negative for abdominal pain, anal bleeding and blood in stool.   Genitourinary: Negative for dysuria, frequency, hematuria and urgency.           Current Outpatient Medications:   •  amitriptyline (ELAVIL) 25 MG tablet, Take 1 tablet by mouth Every Night., Disp: 90 tablet, Rfl: 1  •  amLODIPine (NORVASC) 5 MG tablet, TAKE 1 TABLET BY MOUTH DAILY, Disp: 90 tablet, Rfl: 0  •  aspirin 81 MG chewable tablet, Take 81 mg by mouth daily., Disp: , Rfl:   •  esomeprazole (nexIUM) 40 MG capsule, Take 1 capsule by mouth Daily., Disp: 90 capsule, Rfl: 1  •  ezetimibe (ZETIA) 10 MG tablet, TAKE 1 TABLET BY MOUTH DAILY, Disp: 90 tablet, Rfl: 0  •  metoprolol tartrate (LOPRESSOR) 50 MG tablet, Take 1 tablet by mouth 2 (Two) Times a Day., Disp: 180 tablet, Rfl: 1  •  sildenafil (VIAGRA) 100 MG tablet, Take 100 mg by mouth as needed for  "Erectile Dysfunction., Disp: , Rfl:   •  cephalexin (KEFLEX) 500 MG capsule, Take 1 capsule by mouth 2 (Two) Times a Day for 1 day. Start on morning of biopsy, Disp: 2 capsule, Rfl: 0    Past Medical History:   Diagnosis Date   • Diverticulosis    • Erectile disorder due to 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    • GERD (gastroesophageal reflux disease)    • Hyperlipidemia    • Hypertension    • Insomnia        Past Surgical History:   Procedure Laterality Date   • CHOLECYSTECTOMY         Social History     Socioeconomic History   • Marital status:      Spouse name: Not on file   • Number of children: Not on file   • Years of education: Not on file   • Highest education level: Not on file   Occupational History   • Occupation: retired   Tobacco Use   • Smoking status: Former Smoker   • Smokeless tobacco: Never Used   Substance and Sexual Activity   • Alcohol use: Yes   • Drug use: No       Family History   Problem Relation Age of Onset   • No Known Problems Father    • No Known Problems Mother          Temp 97.7 °F (36.5 °C)   Ht 170.2 cm (67\")   Wt 86.2 kg (190 lb)   BMI 29.76 kg/m²       Physical Exam  Neuro: Alert and oriented ×3  Const: Not agitated or distressed; Vital signs are reviewed. No obvious deformities  Pulmonary: No respiratory distress  Skin: Without pallor or diaphoresis  GI: Abdomen is soft and nontender.  No significant distention.  No hernias noted.  : Penis and testicles are normal. Benign feeling prostate without nodule approximately 30 mL in size.         Data  Results for orders placed or performed in visit on 04/25/19   POC Urinalysis Dipstick, Multipro   Result Value Ref Range    Color Yellow Yellow, Straw, Dark Yellow, Thu    Clarity, UA Clear Clear    Glucose, UA Negative Negative, 1000 mg/dL (3+) mg/dL    Bilirubin Negative Negative    Ketones, UA Negative Negative    Specific Gravity  1.020 1.005 - 1.030    Blood, UA Negative Negative    pH, Urine 7.0 5.0 - 8.0    Protein, " POC Negative Negative mg/dL    Urobilinogen, UA Normal Normal    Nitrite, UA Negative Negative    Leukocytes Negative Negative         Imaging Results (last 7 days)     ** No results found for the last 168 hours. **        Patient's Body mass index is 29.76 kg/m². BMI is above normal parameters. Recommendations include: educational material.    International Prostate Symptom Score  The following is posted based on patient questionnaire answers:  0 - not at all    1-7 mild symptoms  1- Less than one time in five  8-19 moderate symptoms  2 -Less than half the time  20-35 severe symptoms  3 - About half the time  4 - More than half the time  5 - Almost always     For following sections:  Incomplete Emptying: - How often have you had the sensation  of not emptying your bladder completely after you finished urinating?  0  Frequency: -How often have you had to urinate again less than   two hours after you finished urinating?      0  Intermittency: -How often have you found you stopped and started again  Several times when you urinate?       0  Urgency: -How often do you find it difficult to postpone urination?             0  Weak stream: - How often have you had a weak urinary stream?             0  Straining: - How often have you had to push or strain to begin  Urination?          0  Sleeping: -How many times did you most typically get up to urinate   From the time you went to bed at night until the time you got up in the   0  Morning          Total `  0    Quality of Life  How would you feel if you had to live with your urinary condition the way   0  It is now, no better, no worse for the rest of your life?    Where: 0=delighted; 1= pleased, 2= mostly satisfied, 3= mixed, 4 = mostly  Dissatisfied, 5= Unhappy, 6 = terrible      Assessment and Plan  Diagnoses and all orders for this visit:    Elevated PSA  -     POC Urinalysis Dipstick, Multipro  -     cephalexin (KEFLEX) 500 MG capsule; Take 1 capsule by mouth 2 (Two)  Times a Day for 1 day. Start on morning of biopsy  -     Biopsy Prostate      I discussed elevated PSA with him today. We discussed that PSA is a protein measured in the bloodstream that comes exclusively from the prostate gland.  I mentioned to him that all men with a prostate gland will have a certain PSA level. We discussed that this number can be compared to all men and age-specific PSA as well as PSA velocity. We discussed that Prostate Cancer is a possible cause of PSA elevaton, but benign etiologies such as infection, enlargement, aging, and inflammation should also be considered. We discussed that some patients with a normal PSA may also have prostate cancer. The necessity of digital rectal examination is also discussed. The role of free to total PSA Ratio is explained.  We also discussed the relatively recent recommendations of the national prevented task force.  It is explained that the PSA has been downgraded as a standard screening tool.  Essentially this downgrading recommends the study not be used or prostate cancer screening done due to the high risk of a diagnosis of prostate cancer that is life-threatening which, if treated, can result in sexual or severe urinary side effects.  This is compared with comments by both the American Urologic Association and the American Association Clinical Urologists. Discussions among the urology community has led most of us to feel strongly that a patient has a right to know if he has prostate cancer and that all options including a conservative approach to the management of prostate cancer should be discussed between A patient and a physician familiar with the treatment options for prostate cancer.  Additional emphasis is made regarding the possibility of the diagnosis of the nonlife threatening prostate cancer which could affect the patient psychologically or even result for treatment of disease were the risks of that treatment exceeds the risk of death.  The risks  (infection, bleeding, pain, retention, sepsis) and possible benefits of transrectal ultrasound with biopsy of the prostate gland is also discussed. It is explained that some patients require a repeat biopsy based on diagnosis of prostate intraepithelial neoplasia or even a continued rising PSA after an initial biopsy. He voiced no additional questions. He has elected to proceed with TRUS and biopsy of the prostate       (Please note that portions of this note were completed with a voice recognition program.)  Watson Sheehan MD  04/26/19  10:44 AM      Cc: Erwin Ruelas MD

## 2019-04-19 DIAGNOSIS — R97.20 ELEVATED PROSTATE SPECIFIC ANTIGEN (PSA): ICD-10-CM

## 2019-04-20 LAB — PSA SERPL-MCNC: 6.81 NG/ML (ref 0–4)

## 2019-04-25 ENCOUNTER — OFFICE VISIT (OUTPATIENT)
Dept: UROLOGY | Facility: CLINIC | Age: 74
End: 2019-04-25

## 2019-04-25 VITALS — WEIGHT: 190 LBS | TEMPERATURE: 97.7 F | HEIGHT: 67 IN | BODY MASS INDEX: 29.82 KG/M2

## 2019-04-25 DIAGNOSIS — R97.20 ELEVATED PSA: Primary | ICD-10-CM

## 2019-04-25 LAB
BILIRUB BLD-MCNC: NEGATIVE MG/DL
CLARITY, POC: CLEAR
COLOR UR: YELLOW
GLUCOSE UR STRIP-MCNC: NEGATIVE MG/DL
KETONES UR QL: NEGATIVE
LEUKOCYTE EST, POC: NEGATIVE
NITRITE UR-MCNC: NEGATIVE MG/ML
PH UR: 7 [PH] (ref 5–8)
PROT UR STRIP-MCNC: NEGATIVE MG/DL
RBC # UR STRIP: NEGATIVE /UL
SP GR UR: 1.02 (ref 1–1.03)
UROBILINOGEN UR QL: NORMAL

## 2019-04-25 PROCEDURE — 99214 OFFICE O/P EST MOD 30 MIN: CPT | Performed by: UROLOGY

## 2019-04-25 PROCEDURE — 81003 URINALYSIS AUTO W/O SCOPE: CPT | Performed by: UROLOGY

## 2019-04-25 RX ORDER — CEPHALEXIN 500 MG/1
500 CAPSULE ORAL 2 TIMES DAILY
Qty: 2 CAPSULE | Refills: 0 | Status: SHIPPED | OUTPATIENT
Start: 2019-04-25 | End: 2019-04-26

## 2019-04-25 NOTE — PATIENT INSTRUCTIONS

## 2019-05-10 ENCOUNTER — TELEPHONE (OUTPATIENT)
Dept: UROLOGY | Facility: CLINIC | Age: 74
End: 2019-05-10

## 2019-05-10 NOTE — TELEPHONE ENCOUNTER
Called pt, was going to ask pt, to come in little earlier,  But pt states he's thinking about the procedure and will call me at first of week to let me know what he decided to do.

## 2019-05-16 NOTE — PROGRESS NOTES
CC: I am here for my prostate biopsy    TRUS PROSTATE WITH BIOPSY PROCEDURE NOTE  Indications:  Elevated PSA    Pre-operative prep:  fleets enema and oral antibiotic      Procedure:    After proper identification of patient and procedure, patient was placed in the left later decubitus position.  2% lidocaine jelly was instilled per rectum  for topical anesthesia.  The ultrasound probe was gently inserted per rectum. Prostate was scanned from the base of the bladder to the apex.  20 cc of 1% lidocaine plain was then used to perform a prostate nerve block injecting the junction of the seminal vesicle and bladder laterally.      Prostate length: 49 mm    Prostate width: 52.2 mm    Prostate height: 42.3 mm    Prostate volume: 56.5 cc    PSA density: 0.12    Abnormal findings:  No lesions noted, Normal seminal vesicles, Ejaculatory ducts not visible.  No significant dilation, Periurethral calcifications and Transition zone enlargement    Median lobe:  no    A total of 12 biopsies were taken from the base, apex, and mid portion of the gland on both the right and the left sides.     Patient tolerated the procedure well    Complications: none    Estimated blood loss: minimal    Mr. Gomez was given instructions for follow up.  He will notify the office if he has excessive hematuria, hematochezia, fevers, perineal, or abdominal pain.    Follow up:   He will follow up in 1 week  for pathology results.

## 2019-05-17 ENCOUNTER — PROCEDURE VISIT (OUTPATIENT)
Dept: UROLOGY | Facility: CLINIC | Age: 74
End: 2019-05-17

## 2019-05-17 DIAGNOSIS — R97.20 ELEVATED PSA: Primary | ICD-10-CM

## 2019-05-17 PROCEDURE — 76942 ECHO GUIDE FOR BIOPSY: CPT | Performed by: UROLOGY

## 2019-05-17 PROCEDURE — 81003 URINALYSIS AUTO W/O SCOPE: CPT | Performed by: UROLOGY

## 2019-05-17 PROCEDURE — 55700 PR PROSTATE NEEDLE BIOPSY ANY APPROACH: CPT | Performed by: UROLOGY

## 2019-05-17 PROCEDURE — 88342 IMHCHEM/IMCYTCHM 1ST ANTB: CPT | Performed by: UROLOGY

## 2019-05-17 PROCEDURE — G0416 PROSTATE BIOPSY, ANY MTHD: HCPCS | Performed by: UROLOGY

## 2019-05-17 NOTE — PATIENT INSTRUCTIONS
What can I expect after a prostate biopsy?    After the biopsy, it is normal to experience the following sensation or symptoms:    Burning with urination-it is normal to feel burning with urination for the first 24 hours after the biopsy.  It may continue for up to 3 days.    Frequent urination-this will gradually improve over the first 24-36 hours.    Blood in the urine-is normal to have slightly red tinged urine or urine that resembles a grabiel or red wine color.  This may last for 12 hours and may occur intermittently up to a couple weeks.    Blood in stool-you may notice red stains on the toilet tissue or see some bloody streaks in your stool.  This may last up to 5 days.    Blood in the semen-this may persist for up to 6 weeks after your biopsy.  It often starts bright red and then gradually gives way to more of a purplish discoloration.  Eventually it will be rust colored and then go away.    How should I take care of myself after the biopsy?    Drink plenty of fluids to prevent blood clots and infection and the bladder    Avoid strenuous exercise such as jogging, heavy lifting, golfing, and bike riding for 5 days.  He should also avoid riding motorcycles, lawn and tractor equipment, and ATVs during this period.    Be sure to take your final antibiotic tablet, if a prescription was given, the evening following the biopsy.    Do not drink alcoholic beverages until after completing your antibiotics.    Do not take aspirin and anti-inflammatory products such as Celebrex, ibuprofen or naproxen for 5 days following the biopsy unless specifically instructed to do so by the doctor.    Avoid sexual activity for 7 days.    If you are on warfarin, clopidogrel (Plavix), Elliquis, Effient, Trental, Pletal, or other drug to reduce clotting be sure you have discussed when to resume the medication. As a general rule, we like to keep you off this drug approximately 3-4 days following the biopsy.     When should I call my  doctor?    Call [facility] at [phone] if you have any of the following signs and symptoms:    Persistent urinary frequency or burning.    Fever greater than 101°.    Urine that is bright red or has clots large enough to make it difficult to urinate.    Rectal bleeding with clots or pure bloody stools.    Persistent bleeding that lasts longer than 1 week.

## 2019-05-28 ENCOUNTER — TELEPHONE (OUTPATIENT)
Dept: UROLOGY | Facility: CLINIC | Age: 74
End: 2019-05-28

## 2019-05-28 NOTE — TELEPHONE ENCOUNTER
Patient was calling to check on his biopsy results. Patient didn't know if the results were in yet. Patient said that he could be reached at [phone].

## 2019-06-06 LAB
CYTO UR: NORMAL
LAB AP CASE REPORT: NORMAL
PATH REPORT.ADDENDUM SPEC: NORMAL
PATH REPORT.FINAL DX SPEC: NORMAL
PATH REPORT.GROSS SPEC: NORMAL

## 2019-06-15 NOTE — PROGRESS NOTES
Mr. Gomez is 73 y.o. male    CHIEF COMPLAINT: ***    HPI  ***        The following portions of the patient's history were reviewed and updated as appropriate: allergies, current medications, past family history, past medical history, past social history, past surgical history and problem list.      Review of Systems        Current Outpatient Medications:   •  amitriptyline (ELAVIL) 25 MG tablet, Take 1 tablet by mouth Every Night., Disp: 90 tablet, Rfl: 1  •  amLODIPine (NORVASC) 5 MG tablet, TAKE 1 TABLET BY MOUTH DAILY, Disp: 90 tablet, Rfl: 0  •  aspirin 81 MG chewable tablet, Take 81 mg by mouth daily., Disp: , Rfl:   •  esomeprazole (nexIUM) 40 MG capsule, Take 1 capsule by mouth Daily., Disp: 90 capsule, Rfl: 1  •  ezetimibe (ZETIA) 10 MG tablet, TAKE 1 TABLET BY MOUTH DAILY, Disp: 90 tablet, Rfl: 0  •  metoprolol tartrate (LOPRESSOR) 50 MG tablet, Take 1 tablet by mouth 2 (Two) Times a Day., Disp: 180 tablet, Rfl: 1  •  sildenafil (VIAGRA) 100 MG tablet, Take 100 mg by mouth as needed for Erectile Dysfunction., Disp: , Rfl:     Past Medical History:   Diagnosis Date   • Diverticulosis    • Erectile disorder due to 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    • GERD (gastroesophageal reflux disease)    • Hyperlipidemia    • Hypertension    • Insomnia        Past Surgical History:   Procedure Laterality Date   • CHOLECYSTECTOMY         Social History     Socioeconomic History   • Marital status:      Spouse name: Not on file   • Number of children: Not on file   • Years of education: Not on file   • Highest education level: Not on file   Occupational History   • Occupation: retired   Tobacco Use   • Smoking status: Former Smoker   • Smokeless tobacco: Never Used   Substance and Sexual Activity   • Alcohol use: Yes   • Drug use: No       Family History   Problem Relation Age of Onset   • No Known Problems Father    • No Known Problems Mother          There were no vitals taken for this visit.      Physical  Exam      Data  Results for orders placed or performed in visit on 05/17/19   POC Urinalysis Dipstick, Multipro   Result Value Ref Range    Color Yellow Yellow, Straw, Dark Yellow, Thu    Clarity, UA Clear Clear    Glucose, UA Negative Negative, 1000 mg/dL (3+) mg/dL    Bilirubin Negative Negative    Ketones, UA Negative Negative    Specific Gravity  1.020 1.005 - 1.030    Blood, UA Negative Negative    pH, Urine 7.0 5.0 - 8.0    Protein, POC Negative Negative mg/dL    Urobilinogen, UA Normal Normal    Nitrite, UA Negative Negative    Leukocytes Negative Negative   Tissue Pathology Exam   Result Value Ref Range    Addendum       A request for Oncotype DX was received 05/30/2019 for patient Nehemiah Gomez from Dr. Watson Sheehan. The test is to be performed on tissue from case [id number], dated 05/20/2019.  The case report, slides, and blocks for the cited accession were retrieved from archives. The pathologist whose signature appears below reviewed the original pathology report, examined candidate H&E slides, and selected the block(s) 12A appropriate to the specifications of the ordered molecular analysis.  An addendum report will be issued when the results of this molecular test are available.    * * *Electronically Signed * * *    Sotero Bowers M.D.          Case Report       Surgical Pathology Report                         Case: [id number]                                  Authorizing Provider:  Watson Sheehan MD       Collected:           05/17/2019 08:45 AM          Ordering Location:     [facility]     Received:            05/20/2019 08:34 AM                                 [facility]                                                                Pathologist:           Anne Desai MD                                                        Specimens:   1) - Prostate, Left lateral base                                                                    2) - Prostate, Left lateral  mid                                                                     3) - Prostate, Left lateral apex                                                                    4) - Prostate, Left base                                                                            5) - Prostate, Left mid                                                                              6) - Prostate, Left apex                                                                            7) - Prostate, Right base                                                                           8) - Prostate, Right mid                                                                            9) - Prostate, Right apex                                                                           10) - Prostate, Right lateral base                                                                  11) - Prostate, Right lateral mid                                                                   12) - Prostate, Right lateral apex                                                         Final Diagnosis       1.  Prostate, left lateral base, needle biopsy: Benign prostate tissue.    2.  Prostate, left lateral mid, needle biopsy: Benign prostate tissue.    3.  Prostate, left lateral apex, needle biopsy: Benign prostate tissue.    4.  Prostate, left base, needle biopsy: Benign prostate tissue.    5.  Prostate, left mid, needle biopsy: Benign prostate tissue.    6.  Prostate, left apex, needle biopsy: Benign prostate tissue.    7.  Prostate, right base, needle biopsy: Benign prostate tissue.    8.  Prostate, right mid, needle biopsy: Benign prostate tissue.    9.  Prostate, right apex, needle biopsy: Benign prostate tissue.    10.  Prostate, right lateral base, needle biopsy: Benign prostate tissue.    11.  Prostate, right lateral mid, needle biopsy: Benign prostate tissue.    12.  Prostate, right lateral apex, needle biopsy: Adenocarcinoma of the prostate,  Pavel grade 3+3 = 6, involving approximately 5% of the total volume of the biopsy (Grade Group 1).      AJCC stage:T1c pNX      Gross Description          Specimen # 1 is received in formalin labeled with the patient's name, date of birth, and “left lateral base prostate needle biopsy”.  The specimen consists of a telfa pad with one pink/gray soft tissue core biopsy measuring 1.4 cm in length by less than 0.1 cm in diameter.  The specimen is inked with hematoxylin and totally submitted in (block 1A).    Specimen # 2 is received in formalin labeled with the patient's name, date of birth, and “left lateral mid prostate needle biopsy”.  The specimen consists of a telfa pad with one pink/gray soft tissue core biopsy measuring 1.1 cm in length by less than 0.1 cm in diameter.  The specimen is inked with hematoxylin and totally submitted in (block 2A).    Specimen # 3 is received in formalin labeled with the patient's name, date of birth, and “left lateral apex prostate needle biopsy”.  The specimen consists of a telfa pad with one pink/gray soft tissue core biopsy measuring 1.9 cm in length by less than 0.1 cm in diameter.  The specimen is inked with hematoxylin and totally submitted in (block 3A).    Specimen # 4 is received in formalin labeled with the patient's name, date of birth, and “left base prostate needle biopsy”.  The specimen consists of a telfa pad with one pink/gray soft tissue core biopsy measuring 1.8 cm in length by less than 0.1 cm in diameter.  The specimen is inked with hematoxylin and totally submitted in (block 4A).    Specimen # 5 is received in formalin labeled with the patient's name, date of birth, and “left mid prostate needle biopsy”.  The specimen consists of a telfa pad with one pink/gray soft tissue core biopsy measuring 1.7 cm in length by less than 0.1 cm in diameter.  The specimen is inked with hematoxylin and totally submitted in (block 5A).    Specimen # 6 is received in formalin  labeled with the patient's name, date of birth, and “left apex prostate needle biopsy”.  The specimen consists of a telfa pad with 2 pink/gray soft tissue core biopsies measuring from 0.2 cm to 0.9 cm in length by less than 0.1 cm in diameter.  The specimen is inked with hematoxylin and totally submitted in (block 6A).    Specimen # 7 is received in formalin labeled with the patient's name, date of birth, and “right base prostate needle biopsy”.  The specimen consists of a telfa pad with one pink/gray soft tissue core biopsy measuring 0.9 cm in length by less than 0.1 cm in diameter.  The specimen is inked with hematoxylin and totally submitted in (block 7A).    Specimen # 8 is received in formalin labeled with the patient's name, date of birth, and “right mid prostate needle biopsy”.  The specimen consists of a telfa pad with 3 pink/gray soft tissue core biopsy measuring from 0.2 cm to 0.6 cm in length by less than 0.1 cm in diameter.  The specimen is inked with hematoxylin and totally submitted in (block 8A).    Specimen # 9 is received in formalin labeled with the patient's name, date of birth, and “right apex prostate needle biopsy”.  The specimen consists of a telfa pad with 2 pink/gray soft tissue core biopsies measuring 0.4 cm and 1.2 cm in length by less than 0.1 cm in diameter.  The specimen is inked with hematoxylin and totally submitted in (block 9A).    Specimen # 10 is received in formalin labeled with the patient's name, date of birth, and “right lateral base prostate needle biopsy”.  The specimen consists of a telfa pad with one pink/gray soft tissue core biopsy measuring 1.4 cm in length by less than 0.1 cm in diameter.  The specimen is inked with hematoxylin and totally submitted in (block 10A).    Specimen # 11 is received in formalin labeled with the patient's name, date of birth, and “right lateral mid prostate needle biopsy”.  The specimen consists of a telfa pad with one pink/gray soft tissue  core biopsy measuring 0.9 cm in length by less than 0.1 cm in diameter.  The specimen is inked with hematoxylin and totally submitted in (block 11A).    Specimen # 12 is received in formalin labeled with the patient's name, date of birth, and “right lateral apex prostate needle biopsy”.  The specimen consists of a telfa pad with one pink/gray soft tissue core biopsy measuring 1.3 cm in length by less than 0.1 cm in diameter.  The specimen is inked with hematoxylin and totally submitted in (block 12A).                                         Microscopic Description       1.  Step sections of the left lateral base needle biopsy reveal benign prostate tissue.  Also identified is a minute fragment of benign large intestinal mucosa.    2.  Step sections of the left lateral mid needle biopsy reveal benign prostate tissue.    3.  Step sections of the left lateral apex needle biopsy reveal benign prostate tissue.    4.  Step sections of the left base needle biopsy reveal benign prostate tissue.    5.  Step sections of the left mid-needle biopsy reveal benign prostate tissue.    6.  Step sections of the left apex needle biopsy reveal benign prostate tissue.  A minute fragment of benign large intestinal mucosa is also seen.    7.  Step sections of the right base needle biopsy reveal benign prostate tissue.    8.  Step sections of the right mid needle biopsy reveal benign prostate tissue.    9.  Step sections of the right apex needle biopsy reveal benign prostate tissue.    10.  Step sections of the right lateral base needle biopsy reveal benign prostate tissue.    11.  Step sections of the right lateral mid needle biopsy reveal benign prostate tissue.    12.  Step sections of the right lateral apex needle biopsy reveal adenocarcinoma of the prostate, [place] grade 3+3 = 6.  The tumor has a linear length of 0.85 mm and involves approximately 5% of the total volume of the biopsy.  Absence of a basal cell lining in the area of  concern is confirmed utilizing immunohistochemical stain for .  The control stains appropriately.           Imaging Results (last 7 days)     ** No results found for the last 168 hours. **            Assessment and Plan  Diagnoses and all orders for this visit:    Elevated PSA  -     POC Urinalysis Dipstick, Multipro    Benign prostatic hyperplasia with lower urinary tract symptoms, symptom details unspecified  -     POC Urinalysis Dipstick, Multipro            F/U: ***      (Please note that portions of this note were completed with a voice recognition program.)  Salas Hilliard  06/14/19  9:04 PM      Cc:

## 2019-06-17 ENCOUNTER — OFFICE VISIT (OUTPATIENT)
Dept: UROLOGY | Facility: CLINIC | Age: 74
End: 2019-06-17

## 2019-06-17 VITALS — WEIGHT: 190 LBS | BODY MASS INDEX: 29.82 KG/M2 | TEMPERATURE: 98 F | HEIGHT: 67 IN

## 2019-06-17 DIAGNOSIS — C61 PROSTATE CANCER (HCC): Primary | ICD-10-CM

## 2019-06-17 PROCEDURE — 99214 OFFICE O/P EST MOD 30 MIN: CPT | Performed by: UROLOGY

## 2019-06-17 NOTE — PROGRESS NOTES
Prostate Cancer consult  Mr. Gomez is 73 y.o. male    Chief complaint: I am here about my prostate biopsy.    He is here today to discuss his newly diagnosed prostate cancer.  His biopsy was done 3 week(s) ago. This was done in the context of Elevated PSA. Severity best described as adenocarcinoma in 1/12 cores with the maximum jose m grade of 3+3. The patient did not need imaging based on risk stratification. . Symptoms include none.  The patients potency status can be defined as Difficulty maintaining an erection.      Current Outpatient Medications:   •  amitriptyline (ELAVIL) 25 MG tablet, Take 1 tablet by mouth Every Night., Disp: 90 tablet, Rfl: 1  •  amLODIPine (NORVASC) 5 MG tablet, TAKE 1 TABLET BY MOUTH DAILY, Disp: 90 tablet, Rfl: 0  •  aspirin 81 MG chewable tablet, Take 81 mg by mouth daily., Disp: , Rfl:   •  esomeprazole (nexIUM) 40 MG capsule, Take 1 capsule by mouth Daily., Disp: 90 capsule, Rfl: 1  •  ezetimibe (ZETIA) 10 MG tablet, TAKE 1 TABLET BY MOUTH DAILY, Disp: 90 tablet, Rfl: 0  •  metoprolol tartrate (LOPRESSOR) 50 MG tablet, Take 1 tablet by mouth 2 (Two) Times a Day., Disp: 180 tablet, Rfl: 1  •  sildenafil (VIAGRA) 100 MG tablet, Take 100 mg by mouth as needed for Erectile Dysfunction., Disp: , Rfl:     Past Medical History:   Diagnosis Date   • Diverticulosis    • Erectile disorder due to 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    • GERD (gastroesophageal reflux disease)    • Hyperlipidemia    • Hypertension    • Insomnia        Past Surgical History:   Procedure Laterality Date   • CHOLECYSTECTOMY         * Cannot find OR log *    Discussion:    After finding out the patient has done well from the biopsy, we went over the pathology report including the assignment and application of the Apison score, the volume of prostate cancer as predicted by the number of cores and percent involvement in each, We went over how this is used to determine whether or not the patient needs a bone scan and  or CT scan of the abdomen and pelvis in addition to other preoperative workup. We talked about the use of a Tequila nomogram to predict whether or not this prostate cancer is likely to still be confined to the gland.     Based on the D'Amico risk stratification he would be categorized as :PSA <10, Pavel 3+3 and T1 or T2a clinical staging and his Talha Score is  2  which suggests that he has a less than 1% chance of dying from prostate cancer within 5 years of prostatectomy and less than 1% chance of developing metastatic disease 5 years after prostatectomy.      Active Surveillance  Active Surveillance is explained as a technique that involves following the cancer because in most patients there is a significant lag time between diagnosis and spread beyond the prostate gland. This type of surveillance is different than watchful waiting because it provides a way to closely follow the patient using MIKEY, PSA and repeat biopsies to assess an increase in volume or grade of tumor in hopes of still providing curative therapy while delaying the possible side effects that go with therapy as opposed to not treating the patient until there are symptoms of metastatic disease. The former treatment appears to be beneficial for those patients that have low risk disease but a greater than ten year life expectancy. The psychological manifestations of observing a clinically confined cancer are considered and discussed. The latter treatment is only appropriate in those patients whose life expectancy is less than ten years. I emphasized the risks of repeat biopsy. We talked about the experience in  countries in this technique and that a joint study with the [place] and [place] shows promise but will not be complete with definitive recommendations for a few years. It is also emphasized that some patients did not have a cancer free survival and that some patients  in the active surveillance groups but that there was no statistical  significance when compared to the group treated immediately after diagnosis.     Additional information reviewed with the patient and provided to him on active surveillance as follows:   Which patients are the best candidates for no immediate treatment or active surveillance?   Prostate cancer is the most prevalent male cancer, but the majority of men with the disease do not die of prostate cancer. Studies suggest that 30-50 percent of men over the age of 60 years diagnosed with prostate cancer today by PSA screening undergo a treatment that will not extend their life or improve its quality. This does not mean that prostate cancer does not kill men, but rather that some men who are older and/or in poor health with a slowly progressive form of the disease, may not need immediate treatment. The key is to identify the men who for now can safely forego treatment.   Eligible men should meet the following criteria for Very Low or Low Risk Disease:   Very Low Risk Prostate Cancer   · Life expectancy less than 20 years   · Cancer not felt on digital rectal examination (stage T1c)   · PSA density (PSA divided by prostate volume) is less than 0.15   · Qulin score is 6 or less with no Qulin pattern 4 or 5   · No more than 2 cores with cancer, or cancer involving no more than 50% of any core on at least a 12 core biopsy   Low Risk Prostate Cancer   · Life expectancy less than 10-15 years   · Cancer not felt on digital rectal examination and/or small nodule (stage T1c or T2a)   · PSA below 10ng/ml   · Qulin score is 6 or less with no Pavel pattern 4 or 5 on at least a 12 core biopsy   What does active surveillance mean?   In the past, the term watchful waiting meant no treatment until the development of metastatic disease, at which time androgen ablation (hormonal) therapy was initiated.     Today, men who have very low to low risk prostate cancer, and who choose no immediate treatment as an alternative to immediate  treatment, are closely monitored with intervention -if necessary- at a time when cure is still possible. We now refer to this as active surveillance with selective delayed curative intervention. This means that men undergo periodic evaluations including PSA tests, digital rectal examinations, and prostate biopsies. If there is evidence that the cancer is progressing, treatment is recommended with the intention of curing the disease.     We discussed what rationale is used to define lower risk prostate cancer based on biopsy results.   Dr. Sourav Nelson, an expert in prostate pathology at [facility], has shown in two separate studies that cancers less than 0.5 cc (smaller than an eraser tip) can be identified correctly about 75-80 percent of the time using PSA and information from the prostate biopsy. Dr. Nelson's criteria provide a method for helping men choose between immediate treatment and active surveillance.   If a man decides on active surveillance, what recommendations are made for follow-up of the cancer?   · 12-14 core prostate biopsy periodically until age 75 years   · PSA and Digital Rectal Examination every six months     Studies from [facility] active surveillance program have shown that PSA level changes are not sufficient to alert us as to whether or not the cancer is changing. This necessitates surveillance biopsies for careful monitoring. For those men that have a biopsy done about one year after the original biopsy that shows no cancer, we recommend that the interval between biopsies be lengthened to 18-24 months. This interval may increase with time e.g. every 2-3 years. Prostate biopsies may be discontinued at age 75 years, since prostate cancer is unlikely to cause harm beyond this age if a man has not yet had a change in the cancer with careful monitoring.     How does a man who qualifies for surveillance make a choice between active surveillance and treatment?   I also  explained that I do not encourage healthy men in their 50's to strongly consider active surveillance because of their longer life expectancy and time horizon for cancer progression. However, I explained that more and more studies indicate this is probably a feasible option.  For older men, especially over age 65 years who meet criteria, active surveillance is one of the options that should be seriously considered.     The men best suited for surveillance would appear to be those that:   · have the ability to live with cancer without worry reducing their quality of life   · are most concerned about the potential side effects of treatments   · value near term quality of life to a greater extent than any long term consequences that could occur   Each man should carefully weigh the potential loss of quality of life with treatment (radiation or surgery), against the possibility that the window of opportunity for cure will disappear without treatment at some point.     What does a man have to lose with active surveillance of a PSA-detected cancer?   The major concern is that while surveillance is taking place, the tumor will progress beyond the prostate to the point where cure is no longer possible. Experience to date with active surveillance allows a number of conclusions regarding this risk.   · About 30 percent of the men who have entered surveillance have undergone treatment within 5 years   · High grade cancers that are Pavel score 7 or more are found on surveillance biopsies at a rate of about 4% per year   · Most men who undergo treatment do not have high grade cancers   · Recognizing the probability of death from prostate cancer among men who are treated for high grade cancers, it is estimated that a man at age 65 years who enters surveillance with very low risk prostate cancer has   · a 20 year risk of death from prostate cancer of approximately 5%   · a 20 year risk of dying of another cause of approximately  60%     Healthy Living Tips   If a man chooses active surveillance, what dietary changes should he make?   Most men ask what they should be doing in terms of lifestyle changes to help prevent progression of their disease. This is an area of intense interest now but it's one that is clouded by the fact that dietary supplements are a billion dollar industry in the [place] This often makes it hard for consumers to distinguish between science and marketing.     The Prostate Cancer Foundation (PCF.org <http://www.pcf.org/>) has a free guide that can be ordered from their website titled Nutrition, Exercise, and Prostate Cancer. I would advise all men with prostate cancer to read this. Also, the American Cancer Society <http://www.cancer.org/Healthy/EatHealthyGetActive/ACSGuidelinesonNutritionPhysicalActivityforCancerPrevention/ffoz-zwuvrdabzf-aun>has up to date information on lifestyle choices and cancer prevention.   The bottom line is that a diet that is low in animal and dairy fat, high in a wide variety of fruits and vegetables and healthy grains and nuts, is thought be a diet that can slow the progression of cancer. Maintaining a healthy weight with daily exercise is also considered important.     Recent studies have shown that a man's lifestyle-especially nutrition and exercise-has a significant influence in prostate cancer prevention and treatment.   · Follow these tips to help you live a healthier life   · Lose body fat by eating fewer calories per day than you burn   · Maintain muscle mass by increasing protein intake and exercise   · Cut carbohydrate intake to cut down on excess fat and weight, which can slow tumor growth   · Exercise every day, combining cardio fitness and weight lifting   · Eat nine servings a day of colorful fruits and vegetables   · Reduce stress by focusing on living a balanced life and taking care of yourself   · Plan ahead to eat healthfully and minimize stress   · Track your behaviors and  "help chart your progress   · Establish a support system by maintaining healthy relationships with people who understand what you are going through     What about patients with intermediate disease risk?   Intermediate disease risk is best defined as Pavel Grade 7 disease (preferably 3+4), PSA >10, or palpable disease over 2cm on one side or both.   Studies show that the risk of death on active surveillance with intermediate disease is approximately 15% compared to approximately 5% with low risk disease.   Future studies will likely reveal reasonable candidates for intermediate risk disease.     Need for imaging studies to assess the cancer  -We talked about the role of magnetic resonance imaging of the prostate gland especially in patients that are undergoing active surveillance.  The idea of using the MRI to arrange \"targeted \"biopsy using Austin-Tetra software is explained to the patient.  It is explained that there appears to be some advantage to the MRI detection of prostate cancer because it tends to find higher grade lesions that are clinically significant.  It is also explained that surveillance biopsies will also include some type of template or grid biopsy that may be modified depending upon how many targets are seen.  I informed the patient that I do recommend all of my patients on active surveillance be followed with MRI.  -We talked about assessment of metastatic disease.  We discussed the D'Amico classification for risk stratification.  It is explained that only the high risk patients appear to have a reasonable chance of having lymph node metastases and or bone metastases that could be detected by CT scan of abdomen and pelvis and nuclear medicine bone scan respectively. Based on his risk criteria he does not need imaging studies due to risk of false positive result.     Robot Assisted Laparoscopic Prostatectomy  We went over the rationale of the use of robot assisted laparoscopic prostatectomy. I " explained my experience with the procedure. I explained to him that I feel confident with this technique because I have done over 400 cases in nine years. I explained that I had not done that many open radical prostatectomies the previous ten years of practice combined which I believe plays a role in my comfort level. I explained that physicians that do that many open retropubic prostatectomies or perineal prostatectomies likely feel that they offer more benefit to patients with their respective technique because they are more proficient, familiar and comfortable. My experience with cancer control is discussed as well as the fact that cancer free survival from this technique are based on comparing early success and that long term comparisons aren't available yet. Clinically it is my impression that this procedure offers excellent local control with comparable intermediate cure rates. I also explained the technique of open retropubic prostatectomy. We discussed how previous abdominal surgery makes the operation more challenging in addition to body habitus and previous other anatomic variations that sometimes cause the procedure to last longer than expected or require conversion to the open procedure. A shorter hospital stay, more rapid progression during convalescence, and considerable decrease in blood loss due to pneumoperitoneum are explained. Complications of pneumoperitoneum and trochar placement are explained as a unique problem with robot prostatectomy. Preservation of continence and erections are discussed and compared with open surgery, radiation and other modalities of treatment. The role of early penile rehabilitation for more rapid resumption of erectile treatment is also discussed. Bladder neck contracture, Vesicourethral anastomotic leak, ureteral obstruction, and rectal injury are also discussed.     Radiation therapy options  We also talked about the types of radiation therapy are available. I  explained to him the difference in traditional external beam radiotherapy versus three-dimensional conformal IMR T. I explained that the latter seems to reduce the risks of damage to surrounding tissue with no decrease in the benefit of overall cancer treatment in studies. I did explain that typically this requires the placement of prostate side a shearing markers which will require an additional transrectal ultrasound with transrectal puncture to place these markers. He was explained that this is to accommodate the radiation oncologist in locating the prostate gland at each therapy session. I did explain that 1 disadvantages of this technique as the time consumption 8-9 consecutive weeks of treatment. We also discussed the used to prostate interstitial seed implantation. I explained to him the role of a volume study. It is also explained that radioactive seeds are implanted which increases the overall dose of radiation to the prostate while limiting the exposure to the surrounding tissues. I did also explain that our experience with this type of treatment in the limited number of these therapies that we did lead us to make a decision to no longer offer this to patient's locally. I did explain that I have a referral basis would be delighted to send this patient. We also briefly discussed proton therapy since that is not something available in our area. I explained the basics that normal tissue and a fixed distance from the target receives less scatter radiation from proton therapy than x-rays. This is this is the theoretical advantage to use of proton therapy versus IMRT.     Systemic Therapy  We talked about the role of chemotherapy and hormonal therapy in this setting. I explained that the former is used in patients that have failed primary curative therapy and have radiographic evidence of metastatic disease. It is generally preserved for patients that have failed hormonal therapy because of its high toxicity.  Hormonal therapy and prostate cancer is explained. Both orchiectomy as well as systemic therapy using gonadotropin releasing hormones is explained to the patient. The benefits of hormonal therapy is explained as palliative or adjunctive but not curative. The benefit as shown clinically by neoadjuvant hormonal therapy combined with radiotherapy in high risk patients is explained. I also went over the risks of hormonal therapy including the metabolic effects that may lead to metabolic syndrome. The latter is explained at increased risk in heart attack stroke and death. The metabolic effects on bone are explained including the increased possibility of fracture due to osteoporosis. It is explained at vitamin D and calcium supplementation is the recommendation upon initiation of the stalk treatments. Sexual side effects will also testosterone were also discussed.     Cryotherapy  Cryotherapy is explained as freezing of the prostate gland which is believed to kill prostate cancer cells. Clinical studies have shown a benefit for patients with organ confined prostate cancer. I told the patient that we don't offer this therapy, but also I really have never had a patient that has undergone cryotherapy, but I did offer to find a regional urologist for them to see that does it. I explained that it can also be used to treat patients that have failed initial radiation therapy.    Based on this discussion, the patient is opted to undergo active surveillance.    More than half of this 25 minute visit was spent on coordination of care and counseling the patient about the biology of prostate cancer, his biopsy report, risk stratification and management options .  The entire time allotted was spent as face to face time with the patient.       Assessment and Plan  Diagnoses and all orders for this visit:    Prostate cancer (CMS/[facility])  -     Cancel: POC Urinalysis Dipstick, Multipro              (Please note that portions of this note  were completed with a voice recognition program.)Watson Sheehan MD  06/17/19  8:53 AM      Cc:

## 2019-07-08 RX ORDER — SILDENAFIL 100 MG/1
100 TABLET, FILM COATED ORAL AS NEEDED
Qty: 20 TABLET | Refills: 1 | Status: SHIPPED | OUTPATIENT
Start: 2019-07-08 | End: 2020-05-12

## 2019-07-08 RX ORDER — SILDENAFIL 100 MG/1
100 TABLET, FILM COATED ORAL AS NEEDED
Qty: 10 TABLET | Refills: 1 | Status: SHIPPED | OUTPATIENT
Start: 2019-07-08 | End: 2019-07-08

## 2019-07-09 RX ORDER — EZETIMIBE 10 MG/1
TABLET ORAL
Qty: 90 TABLET | Refills: 1 | Status: SHIPPED | OUTPATIENT
Start: 2019-07-09 | End: 2020-01-17

## 2019-07-09 RX ORDER — ESOMEPRAZOLE MAGNESIUM 40 MG/1
CAPSULE, DELAYED RELEASE ORAL
Qty: 90 CAPSULE | Refills: 1 | Status: SHIPPED | OUTPATIENT
Start: 2019-07-09 | End: 2020-01-17

## 2019-07-09 RX ORDER — METOPROLOL TARTRATE 50 MG/1
TABLET, FILM COATED ORAL
Qty: 180 TABLET | Refills: 1 | Status: SHIPPED | OUTPATIENT
Start: 2019-07-09 | End: 2020-01-17

## 2019-07-09 RX ORDER — AMITRIPTYLINE HYDROCHLORIDE 25 MG/1
TABLET, FILM COATED ORAL
Qty: 90 TABLET | Refills: 1 | Status: SHIPPED | OUTPATIENT
Start: 2019-07-09 | End: 2020-01-14

## 2019-07-09 RX ORDER — AMLODIPINE BESYLATE 5 MG/1
TABLET ORAL
Qty: 90 TABLET | Refills: 1 | Status: SHIPPED | OUTPATIENT
Start: 2019-07-09 | End: 2020-01-17

## 2019-08-15 DIAGNOSIS — E78.2 MIXED HYPERLIPIDEMIA: ICD-10-CM

## 2019-08-15 DIAGNOSIS — I10 ESSENTIAL HYPERTENSION: Primary | ICD-10-CM

## 2019-08-17 LAB
ALBUMIN SERPL-MCNC: 4.3 G/DL (ref 3.5–5.2)
ALBUMIN/GLOB SERPL: 1.5 G/DL
ALP SERPL-CCNC: 122 U/L (ref 39–117)
ALT SERPL-CCNC: 17 U/L (ref 1–41)
AST SERPL-CCNC: 20 U/L (ref 1–40)
BASOPHILS # BLD AUTO: 0.1 10*3/MM3 (ref 0–0.2)
BASOPHILS NFR BLD AUTO: 1.4 % (ref 0–1.5)
BILIRUB SERPL-MCNC: 0.4 MG/DL (ref 0.2–1.2)
BUN SERPL-MCNC: 23 MG/DL (ref 8–23)
BUN/CREAT SERPL: 19.5 (ref 7–25)
CALCIUM SERPL-MCNC: 9.1 MG/DL (ref 8.6–10.5)
CHLORIDE SERPL-SCNC: 95 MMOL/L (ref 98–107)
CHOLEST SERPL-MCNC: 188 MG/DL (ref 0–200)
CO2 SERPL-SCNC: 29.6 MMOL/L (ref 22–29)
CREAT SERPL-MCNC: 1.18 MG/DL (ref 0.76–1.27)
EOSINOPHIL # BLD AUTO: 0.21 10*3/MM3 (ref 0–0.4)
EOSINOPHIL NFR BLD AUTO: 3 % (ref 0.3–6.2)
ERYTHROCYTE [DISTWIDTH] IN BLOOD BY AUTOMATED COUNT: 13 % (ref 12.3–15.4)
GLOBULIN SER CALC-MCNC: 2.8 GM/DL
GLUCOSE SERPL-MCNC: 126 MG/DL (ref 65–99)
HCT VFR BLD AUTO: 48.4 % (ref 37.5–51)
HDLC SERPL-MCNC: 38 MG/DL (ref 40–60)
HGB BLD-MCNC: 15.7 G/DL (ref 13–17.7)
IMM GRANULOCYTES # BLD AUTO: 0.02 10*3/MM3 (ref 0–0.05)
IMM GRANULOCYTES NFR BLD AUTO: 0.3 % (ref 0–0.5)
LDLC SERPL CALC-MCNC: 112 MG/DL (ref 0–100)
LYMPHOCYTES # BLD AUTO: 1.35 10*3/MM3 (ref 0.7–3.1)
LYMPHOCYTES NFR BLD AUTO: 19.5 % (ref 19.6–45.3)
MCH RBC QN AUTO: 29.4 PG (ref 26.6–33)
MCHC RBC AUTO-ENTMCNC: 32.4 G/DL (ref 31.5–35.7)
MCV RBC AUTO: 90.6 FL (ref 79–97)
MONOCYTES # BLD AUTO: 0.73 10*3/MM3 (ref 0.1–0.9)
MONOCYTES NFR BLD AUTO: 10.6 % (ref 5–12)
NEUTROPHILS # BLD AUTO: 4.5 10*3/MM3 (ref 1.7–7)
NEUTROPHILS NFR BLD AUTO: 65.2 % (ref 42.7–76)
NRBC BLD AUTO-RTO: 0 /100 WBC (ref 0–0.2)
PLATELET # BLD AUTO: 236 10*3/MM3 (ref 140–450)
POTASSIUM SERPL-SCNC: 3.8 MMOL/L (ref 3.5–5.2)
PROT SERPL-MCNC: 7.1 G/DL (ref 6–8.5)
RBC # BLD AUTO: 5.34 10*6/MM3 (ref 4.14–5.8)
SODIUM SERPL-SCNC: 136 MMOL/L (ref 136–145)
TRIGL SERPL-MCNC: 192 MG/DL (ref 0–150)
TSH SERPL DL<=0.005 MIU/L-ACNC: 1.37 MIU/ML (ref 0.27–4.2)
VLDLC SERPL CALC-MCNC: 38.4 MG/DL
WBC # BLD AUTO: 6.91 10*3/MM3 (ref 3.4–10.8)

## 2019-08-20 ENCOUNTER — OFFICE VISIT (OUTPATIENT)
Dept: FAMILY MEDICINE CLINIC | Facility: CLINIC | Age: 74
End: 2019-08-20

## 2019-08-20 VITALS
OXYGEN SATURATION: 97 % | DIASTOLIC BLOOD PRESSURE: 76 MMHG | BODY MASS INDEX: 30.76 KG/M2 | HEART RATE: 62 BPM | HEIGHT: 67 IN | SYSTOLIC BLOOD PRESSURE: 122 MMHG | WEIGHT: 196 LBS | RESPIRATION RATE: 16 BRPM

## 2019-08-20 DIAGNOSIS — E78.2 MIXED HYPERLIPIDEMIA: ICD-10-CM

## 2019-08-20 DIAGNOSIS — C61 PROSTATE CANCER (HCC): ICD-10-CM

## 2019-08-20 DIAGNOSIS — I10 ESSENTIAL HYPERTENSION: Primary | ICD-10-CM

## 2019-08-20 DIAGNOSIS — F51.01 PRIMARY INSOMNIA: ICD-10-CM

## 2019-08-20 PROCEDURE — 99214 OFFICE O/P EST MOD 30 MIN: CPT | Performed by: FAMILY MEDICINE

## 2019-08-20 NOTE — PROGRESS NOTES
"Subjective   Nehemiah Gomez is a 73 y.o. male.     Chief Complaint   Patient presents with   • Follow-up     6 mo   htn       History of Present Illness     he thinks bp is stable wituout cp or ha--tolerating meds for hyhperlpidemia---his inomsnia is stable with meds--his gerdf is stable wiith meds wituout dysphagia  He was dx with prostate ca by dr samuels    Current Outpatient Medications:   •  amitriptyline (ELAVIL) 25 MG tablet, TAKE 1 TABLET EVERY NIGHT, Disp: 90 tablet, Rfl: 1  •  amLODIPine (NORVASC) 5 MG tablet, TAKE 1 TABLET DAILY, Disp: 90 tablet, Rfl: 1  •  aspirin 81 MG chewable tablet, Take 81 mg by mouth daily., Disp: , Rfl:   •  esomeprazole (nexIUM) 40 MG capsule, TAKE 1 CAPSULE DAILY, Disp: 90 capsule, Rfl: 1  •  ezetimibe (ZETIA) 10 MG tablet, TAKE 1 TABLET DAILY, Disp: 90 tablet, Rfl: 1  •  metoprolol tartrate (LOPRESSOR) 50 MG tablet, TAKE 1 TABLET TWICE A DAY, Disp: 180 tablet, Rfl: 1  •  sildenafil (VIAGRA) 100 MG tablet, Take 1 tablet by mouth As Needed for erectile dysfunction., Disp: 20 tablet, Rfl: 1  No Known Allergies    Past Medical History:   Diagnosis Date   • Diverticulosis    • Erectile disorder due to 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    • GERD (gastroesophageal reflux disease)    • Hyperlipidemia    • Hypertension    • Insomnia      Past Surgical History:   Procedure Laterality Date   • CHOLECYSTECTOMY         Review of Systems   Constitutional: Negative.    HENT: Negative.    Eyes: Negative.    Respiratory: Negative.    Cardiovascular: Negative.    Gastrointestinal: Negative.    Endocrine: Negative.    Genitourinary: Negative.    Musculoskeletal: Negative.    Skin: Negative.    Allergic/Immunologic: Negative.    Neurological: Negative.    Hematological: Negative.    Psychiatric/Behavioral: Negative.        Objective  /76   Pulse 62   Resp 16   Ht 170.2 cm (67\")   Wt 88.9 kg (196 lb)   SpO2 97%   BMI 30.70 kg/m²   Physical Exam   Constitutional: He is oriented to person, " place, and time. He appears well-developed and well-nourished.   HENT:   Head: Normocephalic and atraumatic.   Right Ear: External ear normal.   Left Ear: External ear normal.   Nose: Nose normal.   Mouth/Throat: Oropharynx is clear and moist.   Eyes: Conjunctivae and EOM are normal. Pupils are equal, round, and reactive to light.   Neck: Normal range of motion. Neck supple.   Cardiovascular: Normal rate, regular rhythm, normal heart sounds and intact distal pulses.   Pulmonary/Chest: Effort normal and breath sounds normal.   Abdominal: Soft. Bowel sounds are normal.   Musculoskeletal: Normal range of motion.   Neurological: He is alert and oriented to person, place, and time.   Skin: Skin is warm. Capillary refill takes less than 2 seconds.   Psychiatric: He has a normal mood and affect. His behavior is normal. Judgment and thought content normal.   Nursing note and vitals reviewed.      Assessment/Plan   Nehemiah was seen today for follow-up.    Diagnoses and all orders for this visit:    Essential hypertension    Mixed hyperlipidemia    Primary insomnia    Prostate cancer (CMS/HCC)    .Patient's Body mass index is 30.7 kg/m². BMI is above normal parameters. Recommendations include: nutrition counseling.    We discussed huis recent labsz         No orders of the defined types were placed in this encounter.  Current outpatient and discharge medications have been reconciled for the patient.  Reviewed by: Erwin Ruelas MD  Follow up: 6 month(s)

## 2019-12-04 ENCOUNTER — RESULTS ENCOUNTER (OUTPATIENT)
Dept: UROLOGY | Facility: CLINIC | Age: 74
End: 2019-12-04

## 2019-12-04 DIAGNOSIS — C61 PROSTATE CANCER (HCC): ICD-10-CM

## 2019-12-11 LAB — PSA SERPL-MCNC: 5.2 NG/ML (ref 0–4)

## 2019-12-20 ENCOUNTER — OFFICE VISIT (OUTPATIENT)
Dept: UROLOGY | Facility: CLINIC | Age: 74
End: 2019-12-20

## 2019-12-20 VITALS — WEIGHT: 196 LBS | HEIGHT: 67 IN | TEMPERATURE: 98 F | BODY MASS INDEX: 30.76 KG/M2

## 2019-12-20 DIAGNOSIS — C61 PROSTATE CANCER (HCC): Primary | ICD-10-CM

## 2019-12-20 PROCEDURE — 99213 OFFICE O/P EST LOW 20 MIN: CPT | Performed by: UROLOGY

## 2019-12-20 NOTE — PROGRESS NOTES
Mr. Gomez is 74 y.o. male    CHIEF COMPLAINT: I am here for my 6 month follow up for prostate cancer. My PSA is 5.200    HPI  Patient returns a follow-up prostate cancer.  This identified the context of elevated PSA.  His PSA is repeated and returns today at 5.2.Patient denies any possible systemic symptoms of prostate cancer such as weight loss, lower extremity edema, or skeletal pain that could be worrisome for metastases.He is voiding with a good stream. His urine is clear.   History related to this issue:   - 06/2019: TRUS prostate with biopsy  Final Diagnosis   1.  Prostate, left lateral base, needle biopsy: Benign prostate tissue.     2.  Prostate, left lateral mid, needle biopsy: Benign prostate tissue.     3.  Prostate, left lateral apex, needle biopsy: Benign prostate tissue.     4.  Prostate, left base, needle biopsy: Benign prostate tissue.     5.  Prostate, left mid, needle biopsy: Benign prostate tissue.     6.  Prostate, left apex, needle biopsy: Benign prostate tissue.     7.  Prostate, right base, needle biopsy: Benign prostate tissue.     8.  Prostate, right mid, needle biopsy: Benign prostate tissue.     9.  Prostate, right apex, needle biopsy: Benign prostate tissue.     10.  Prostate, right lateral base, needle biopsy: Benign prostate tissue.     11.  Prostate, right lateral mid, needle biopsy: Benign prostate tissue.     12.  Prostate, right lateral apex, needle biopsy: Adenocarcinoma of the prostate, [place] grade 3+3 = 6, involving approximately 5% of the total volume of the biopsy (Grade Group 1).       AJCC stage:T1c pNX    Oncotype DX: He has a GPS score of  27.  This combines the multilevel gene panel done with the biopsy specimen and his NCCN risk group based upon Pavel score, PSA, clinical staging, and tumor volume as determined by positive core percentage, tumor involvement within each core, and his PSA density based upon ultrasound volume of the gland.  This study suggests the  following:  -His risk of prostate cancer death within 10 years is <1%.  -His chance of metastatic disease demonstratable by CT scan or bone scan within 10 years is  1%.  -His risk of adverse pathology at the time of prostatectomy is 14  %.  The chance of Pavel grade of equal to or worse than 4+3 there is 8% and his risk of extracapsular disease is 7 %.            Lab Results   Component Value Date    PSA 5.200 (H) 12/10/2019    PSA 6.810 (H) 04/19/2019    PSA 6.130 (H) 02/19/2019       The following portions of the patient's history were reviewed and updated as appropriate: allergies, current medications, past family history, past medical history, past social history, past surgical history and problem list.      Review of Systems  Constitutional: Negative for chills and fever.   Gastrointestinal: Negative for abdominal pain, anal bleeding and blood in stool.   Genitourinary: Negative for flank pain and hematuria.      Current Outpatient Medications:   •  amitriptyline (ELAVIL) 25 MG tablet, TAKE 1 TABLET EVERY NIGHT, Disp: 90 tablet, Rfl: 1  •  amLODIPine (NORVASC) 5 MG tablet, TAKE 1 TABLET DAILY, Disp: 90 tablet, Rfl: 1  •  aspirin 81 MG chewable tablet, Take 81 mg by mouth daily., Disp: , Rfl:   •  esomeprazole (nexIUM) 40 MG capsule, TAKE 1 CAPSULE DAILY, Disp: 90 capsule, Rfl: 1  •  ezetimibe (ZETIA) 10 MG tablet, TAKE 1 TABLET DAILY, Disp: 90 tablet, Rfl: 1  •  metoprolol tartrate (LOPRESSOR) 50 MG tablet, TAKE 1 TABLET TWICE A DAY, Disp: 180 tablet, Rfl: 1  •  sildenafil (VIAGRA) 100 MG tablet, Take 1 tablet by mouth As Needed for erectile dysfunction., Disp: 20 tablet, Rfl: 1    Past Medical History:   Diagnosis Date   • Diverticulosis    • Erectile disorder due to 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    • GERD (gastroesophageal reflux disease)    • Hyperlipidemia    • Hypertension    • Insomnia        Past Surgical History:   Procedure Laterality Date   • CHOLECYSTECTOMY         Social History  "    Socioeconomic History   • Marital status:      Spouse name: Not on file   • Number of children: Not on file   • Years of education: Not on file   • Highest education level: Not on file   Occupational History   • Occupation: retired   Tobacco Use   • Smoking status: Former Smoker   • Smokeless tobacco: Never Used   Substance and Sexual Activity   • Alcohol use: Yes   • Drug use: No       Family History   Problem Relation Age of Onset   • No Known Problems Father    • No Known Problems Mother          Temp 98 °F (36.7 °C)   Ht 170.2 cm (67\")   Wt 88.9 kg (196 lb)   BMI 30.70 kg/m²       Physical Exam      Data  Results for orders placed or performed in visit on 12/17/19   POC Urinalysis Dipstick, Multipro   Result Value Ref Range    Color Yellow Yellow, Straw, Dark Yellow, Thu    Clarity, UA Clear Clear    Glucose, UA Negative Negative, 1000 mg/dL (3+) mg/dL    Bilirubin Negative Negative    Ketones, UA Negative Negative    Specific Gravity  1.015 1.005 - 1.030    Blood, UA Negative Negative    pH, Urine 6.0 5.0 - 8.0    Protein, POC Negative Negative mg/dL    Urobilinogen, UA Normal Normal    Nitrite, UA Negative Negative    Leukocytes Negative Negative       Assessment and Plan  Diagnoses and all orders for this visit:    Prostate cancer (CMS/[facility])  -     PSA DIAGNOSTIC; Future  -     MRI Pelvis With & Without Contrast; Future      Symptoms are stable at this time.  We will repeat MRI in 6 months to decide whether or not he needs a UroNav fusion biopsy.  Likely defer confirmatory biopsy if MRI is negative.      Follow-up in 6 months with MRI.      (Please note that portions of this note were completed with a voice recognition program.)  Watson Sheehan MD  12/20/19  2:06 PM      "

## 2020-01-14 RX ORDER — AMITRIPTYLINE HYDROCHLORIDE 25 MG/1
TABLET, FILM COATED ORAL
Qty: 90 TABLET | Refills: 4 | Status: SHIPPED | OUTPATIENT
Start: 2020-01-14 | End: 2021-03-17

## 2020-01-17 RX ORDER — ESOMEPRAZOLE MAGNESIUM 40 MG/1
CAPSULE, DELAYED RELEASE ORAL
Qty: 90 CAPSULE | Refills: 0 | Status: SHIPPED | OUTPATIENT
Start: 2020-01-17 | End: 2020-04-29

## 2020-01-17 RX ORDER — EZETIMIBE 10 MG/1
TABLET ORAL
Qty: 90 TABLET | Refills: 0 | Status: SHIPPED | OUTPATIENT
Start: 2020-01-17 | End: 2020-04-06

## 2020-01-17 RX ORDER — AMLODIPINE BESYLATE 5 MG/1
TABLET ORAL
Qty: 90 TABLET | Refills: 0 | Status: SHIPPED | OUTPATIENT
Start: 2020-01-17 | End: 2020-04-06

## 2020-01-17 RX ORDER — METOPROLOL TARTRATE 50 MG/1
TABLET, FILM COATED ORAL
Qty: 180 TABLET | Refills: 0 | Status: SHIPPED | OUTPATIENT
Start: 2020-01-17 | End: 2020-04-06

## 2020-02-11 DIAGNOSIS — E78.2 MIXED HYPERLIPIDEMIA: ICD-10-CM

## 2020-02-11 DIAGNOSIS — I10 ESSENTIAL HYPERTENSION: Primary | ICD-10-CM

## 2020-02-13 LAB
ALBUMIN SERPL-MCNC: 4 G/DL (ref 3.5–5.2)
ALBUMIN/GLOB SERPL: 1.5 G/DL
ALP SERPL-CCNC: 95 U/L (ref 39–117)
ALT SERPL-CCNC: 13 U/L (ref 1–41)
AST SERPL-CCNC: 18 U/L (ref 1–40)
BASOPHILS # BLD AUTO: 0.08 10*3/MM3 (ref 0–0.2)
BASOPHILS NFR BLD AUTO: 1.5 % (ref 0–1.5)
BILIRUB SERPL-MCNC: 0.4 MG/DL (ref 0.2–1.2)
BUN SERPL-MCNC: 12 MG/DL (ref 8–23)
BUN/CREAT SERPL: 10.7 (ref 7–25)
CALCIUM SERPL-MCNC: 8.8 MG/DL (ref 8.6–10.5)
CHLORIDE SERPL-SCNC: 103 MMOL/L (ref 98–107)
CHOLEST SERPL-MCNC: 188 MG/DL (ref 0–200)
CO2 SERPL-SCNC: 28 MMOL/L (ref 22–29)
CREAT SERPL-MCNC: 1.12 MG/DL (ref 0.76–1.27)
EOSINOPHIL # BLD AUTO: 0.16 10*3/MM3 (ref 0–0.4)
EOSINOPHIL NFR BLD AUTO: 3 % (ref 0.3–6.2)
ERYTHROCYTE [DISTWIDTH] IN BLOOD BY AUTOMATED COUNT: 12.5 % (ref 12.3–15.4)
GLOBULIN SER CALC-MCNC: 2.6 GM/DL
GLUCOSE SERPL-MCNC: 106 MG/DL (ref 65–99)
HCT VFR BLD AUTO: 45.1 % (ref 37.5–51)
HDLC SERPL-MCNC: 40 MG/DL (ref 40–60)
HGB BLD-MCNC: 15.7 G/DL (ref 13–17.7)
IMM GRANULOCYTES # BLD AUTO: 0.02 10*3/MM3 (ref 0–0.05)
IMM GRANULOCYTES NFR BLD AUTO: 0.4 % (ref 0–0.5)
LDLC SERPL CALC-MCNC: 125 MG/DL (ref 0–100)
LYMPHOCYTES # BLD AUTO: 1.22 10*3/MM3 (ref 0.7–3.1)
LYMPHOCYTES NFR BLD AUTO: 22.5 % (ref 19.6–45.3)
MCH RBC QN AUTO: 30.2 PG (ref 26.6–33)
MCHC RBC AUTO-ENTMCNC: 34.8 G/DL (ref 31.5–35.7)
MCV RBC AUTO: 86.7 FL (ref 79–97)
MONOCYTES # BLD AUTO: 0.53 10*3/MM3 (ref 0.1–0.9)
MONOCYTES NFR BLD AUTO: 9.8 % (ref 5–12)
NEUTROPHILS # BLD AUTO: 3.41 10*3/MM3 (ref 1.7–7)
NEUTROPHILS NFR BLD AUTO: 62.8 % (ref 42.7–76)
NRBC BLD AUTO-RTO: 0 /100 WBC (ref 0–0.2)
PLATELET # BLD AUTO: 215 10*3/MM3 (ref 140–450)
POTASSIUM SERPL-SCNC: 4.4 MMOL/L (ref 3.5–5.2)
PROT SERPL-MCNC: 6.6 G/DL (ref 6–8.5)
RBC # BLD AUTO: 5.2 10*6/MM3 (ref 4.14–5.8)
SODIUM SERPL-SCNC: 142 MMOL/L (ref 136–145)
TRIGL SERPL-MCNC: 115 MG/DL (ref 0–150)
TSH SERPL DL<=0.005 MIU/L-ACNC: 1.46 UIU/ML (ref 0.27–4.2)
VLDLC SERPL CALC-MCNC: 23 MG/DL
WBC # BLD AUTO: 5.42 10*3/MM3 (ref 3.4–10.8)

## 2020-02-18 ENCOUNTER — OFFICE VISIT (OUTPATIENT)
Dept: FAMILY MEDICINE CLINIC | Facility: CLINIC | Age: 75
End: 2020-02-18

## 2020-02-18 VITALS
DIASTOLIC BLOOD PRESSURE: 80 MMHG | SYSTOLIC BLOOD PRESSURE: 124 MMHG | HEART RATE: 64 BPM | BODY MASS INDEX: 31.23 KG/M2 | RESPIRATION RATE: 16 BRPM | OXYGEN SATURATION: 98 % | HEIGHT: 67 IN | WEIGHT: 199 LBS

## 2020-02-18 DIAGNOSIS — E78.2 MIXED HYPERLIPIDEMIA: ICD-10-CM

## 2020-02-18 DIAGNOSIS — I10 ESSENTIAL HYPERTENSION: Primary | ICD-10-CM

## 2020-02-18 DIAGNOSIS — F51.01 PRIMARY INSOMNIA: ICD-10-CM

## 2020-02-18 DIAGNOSIS — K21.9 GERD WITHOUT ESOPHAGITIS: ICD-10-CM

## 2020-02-18 PROCEDURE — 99214 OFFICE O/P EST MOD 30 MIN: CPT | Performed by: FAMILY MEDICINE

## 2020-02-18 NOTE — PROGRESS NOTES
"Subjective   Nehemiah Gomez is a 74 y.o. male.     Chief Complaint   Patient presents with   • Follow-up     6 mo  htn       History of Present Illness     he nots good bp control witout cp or ha--his gerd symptoms are stablw witjhout dysphgia...tolerating zetia witout myalgias...his inomnia is tx wiith elavil witout issues      Current Outpatient Medications:   •  amitriptyline (ELAVIL) 25 MG tablet, TAKE 1 TABLET EVERY NIGHT, Disp: 90 tablet, Rfl: 4  •  amLODIPine (NORVASC) 5 MG tablet, TAKE 1 TABLET DAILY, Disp: 90 tablet, Rfl: 0  •  aspirin 81 MG chewable tablet, Take 81 mg by mouth daily., Disp: , Rfl:   •  esomeprazole (nexIUM) 40 MG capsule, TAKE 1 CAPSULE DAILY, Disp: 90 capsule, Rfl: 0  •  ezetimibe (ZETIA) 10 MG tablet, TAKE 1 TABLET DAILY, Disp: 90 tablet, Rfl: 0  •  metoprolol tartrate (LOPRESSOR) 50 MG tablet, TAKE 1 TABLET TWICE A DAY, Disp: 180 tablet, Rfl: 0  •  sildenafil (VIAGRA) 100 MG tablet, Take 1 tablet by mouth As Needed for erectile dysfunction., Disp: 20 tablet, Rfl: 1  No Known Allergies    Past Medical History:   Diagnosis Date   • Diverticulosis    • Erectile disorder due to 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    • GERD (gastroesophageal reflux disease)    • Hyperlipidemia    • Hypertension    • Insomnia      Past Surgical History:   Procedure Laterality Date   • CHOLECYSTECTOMY         Review of Systems   Constitutional: Negative.    HENT: Negative.    Eyes: Negative.    Respiratory: Negative.    Cardiovascular: Negative.    Gastrointestinal: Negative.    Endocrine: Negative.    Genitourinary: Negative.    Musculoskeletal: Negative.    Skin: Negative.    Allergic/Immunologic: Negative.    Neurological: Negative.    Hematological: Negative.    Psychiatric/Behavioral: Negative.        Objective  /80   Pulse 64   Resp 16   Ht 170.2 cm (67\")   Wt 90.3 kg (199 lb)   SpO2 98%   BMI 31.17 kg/m²   Physical Exam   Constitutional: He is oriented to person, place, and time. He appears " well-developed and well-nourished.   HENT:   Head: Normocephalic and atraumatic.   Right Ear: External ear normal.   Left Ear: External ear normal.   Nose: Nose normal.   Mouth/Throat: Oropharynx is clear and moist.   Eyes: Pupils are equal, round, and reactive to light. Conjunctivae and EOM are normal.   Neck: Normal range of motion. Neck supple.   Cardiovascular: Normal rate, regular rhythm, normal heart sounds and intact distal pulses.   Pulmonary/Chest: Effort normal and breath sounds normal.   Abdominal: Soft. Bowel sounds are normal.   Musculoskeletal: Normal range of motion.   Neurological: He is alert and oriented to person, place, and time.   Skin: Skin is warm. Capillary refill takes less than 2 seconds.   Psychiatric: He has a normal mood and affect. His behavior is normal. Judgment and thought content normal.   Nursing note and vitals reviewed.      Assessment/Plan   Nehemiah was seen today for follow-up.    Diagnoses and all orders for this visit:    Essential hypertension    Mixed hyperlipidemia    GERD without esophagitis    Primary insomnia    Patient's Body mass index is 31.17 kg/m². BMI is above normal parameters. Recommendations include: nutrition counseling.  We dicsussed his lipids--he blames indiscretion with diet    He says his colon is up to date  Advance Care Planning   ACP discussion was held with the patient during this visit. Patient does not have an advance directive, information provided.       No orders of the defined types were placed in this encounter.    Current outpatient and discharge medications have been reconciled for the patient.  Reviewed by: Erwin Ruelas MD  Follow up: 6 month(s)

## 2020-04-06 RX ORDER — EZETIMIBE 10 MG/1
TABLET ORAL
Qty: 90 TABLET | Refills: 3 | Status: SHIPPED | OUTPATIENT
Start: 2020-04-06 | End: 2021-02-22

## 2020-04-06 RX ORDER — AMLODIPINE BESYLATE 5 MG/1
TABLET ORAL
Qty: 90 TABLET | Refills: 3 | Status: SHIPPED | OUTPATIENT
Start: 2020-04-06 | End: 2021-02-22

## 2020-04-06 RX ORDER — METOPROLOL TARTRATE 50 MG/1
TABLET, FILM COATED ORAL
Qty: 180 TABLET | Refills: 3 | Status: SHIPPED | OUTPATIENT
Start: 2020-04-06 | End: 2021-02-22

## 2020-04-29 RX ORDER — ESOMEPRAZOLE MAGNESIUM 40 MG/1
CAPSULE, DELAYED RELEASE ORAL
Qty: 90 CAPSULE | Refills: 1 | Status: SHIPPED | OUTPATIENT
Start: 2020-04-29 | End: 2020-10-19

## 2020-05-12 RX ORDER — SILDENAFIL 100 MG/1
TABLET, FILM COATED ORAL
Qty: 20 TABLET | Refills: 1 | Status: SHIPPED | OUTPATIENT
Start: 2020-05-12 | End: 2022-02-25

## 2020-06-07 ENCOUNTER — RESULTS ENCOUNTER (OUTPATIENT)
Dept: UROLOGY | Facility: CLINIC | Age: 75
End: 2020-06-07

## 2020-06-07 DIAGNOSIS — C61 PROSTATE CANCER (HCC): ICD-10-CM

## 2020-06-10 DIAGNOSIS — C61 PROSTATE CANCER (HCC): Primary | ICD-10-CM

## 2020-06-18 NOTE — PROGRESS NOTES
Mr. Gomez is 74 y.o. male    CHIEF COMPLAINT: I am here today for my 6 month follow up for Prostate Cancer. Imagine was done at [facility].     HPI  Follow up Prostate cancer  Location: Prostate gland  Quality:  Adenocarcinoma  Severity: Pavel grade group 1, low-volume organ confined disease-very low risk  Duration: Diagnosed June 2019  Context: This was identified when he had a biopsy to evaluate elevated PSA which was obtained as part of prostate cancer screening.  Modifying factors: Currently undergoing active surveillance  Associated signs or symptoms: From a voiding standpoint he voids with a good stream without any evidence of leakage.. Patient denies any possible systemic symptoms of prostate cancer such as weight loss, lower extremity edema, or skeletal pain that could be worrisome for metastases.  History related to this issue:   - 06/2019: TRUS prostate with biopsy  Final Diagnosis   1.  Prostate, left lateral base, needle biopsy: Benign prostate tissue.     2.  Prostate, left lateral mid, needle biopsy: Benign prostate tissue.     3.  Prostate, left lateral apex, needle biopsy: Benign prostate tissue.     4.  Prostate, left base, needle biopsy: Benign prostate tissue.     5.  Prostate, left mid, needle biopsy: Benign prostate tissue.     6.  Prostate, left apex, needle biopsy: Benign prostate tissue.     7.  Prostate, right base, needle biopsy: Benign prostate tissue.     8.  Prostate, right mid, needle biopsy: Benign prostate tissue.     9.  Prostate, right apex, needle biopsy: Benign prostate tissue.     10.  Prostate, right lateral base, needle biopsy: Benign prostate tissue.     11.  Prostate, right lateral mid, needle biopsy: Benign prostate tissue.     12.  Prostate, right lateral apex, needle biopsy: Adenocarcinoma of the prostate, Pavel grade 3+3 = 6, involving approximately 5% of the total volume of the biopsy (Grade Group 1).       AJCC stage:T1c pNX     Oncotype DX: He has a GPS score of   27.  This combines the multilevel gene panel done with the biopsy specimen and his NCCN risk group based upon Pavel score, PSA, clinical staging, and tumor volume as determined by positive core percentage, tumor involvement within each core, and his PSA density based upon ultrasound volume of the gland.  This study suggests the following:  -His risk of prostate cancer death within 10 years is <1%.  -His chance of metastatic disease demonstratable by CT scan or bone scan within 10 years is  1%.  -His risk of adverse pathology at the time of prostatectomy is 14  %.  The chance of Pavel grade of equal to or worse than 4+3 there is 8% and his risk of extracapsular disease is 7 %.        Lab Results   Component Value Date    PSA 5.860 (H) 06/19/2020    PSA 5.200 (H) 12/10/2019    PSA 6.810 (H) 04/19/2019         The following portions of the patient's history were reviewed and updated as appropriate: allergies, current medications, past family history, past medical history, past social history, past surgical history and problem list.      Review of Systems   Constitutional: Negative for chills and fever.   Gastrointestinal: Negative for abdominal pain, anal bleeding and blood in stool.   Genitourinary: Negative for dysuria, frequency, hematuria and urgency.         Current Outpatient Medications:   •  amitriptyline (ELAVIL) 25 MG tablet, TAKE 1 TABLET EVERY NIGHT, Disp: 90 tablet, Rfl: 4  •  amLODIPine (NORVASC) 5 MG tablet, TAKE 1 TABLET DAILY, Disp: 90 tablet, Rfl: 3  •  aspirin 81 MG chewable tablet, Take 81 mg by mouth daily., Disp: , Rfl:   •  esomeprazole (nexIUM) 40 MG capsule, TAKE 1 CAPSULE DAILY, Disp: 90 capsule, Rfl: 1  •  ezetimibe (ZETIA) 10 MG tablet, TAKE 1 TABLET DAILY, Disp: 90 tablet, Rfl: 3  •  metoprolol tartrate (LOPRESSOR) 50 MG tablet, TAKE 1 TABLET TWICE A DAY, Disp: 180 tablet, Rfl: 3  •  sildenafil (VIAGRA) 100 MG tablet, TAKE 1 TABLET ONCE DAILY AS NEEDED FOR ED, Disp: 20 tablet, Rfl:  "1    Past Medical History:   Diagnosis Date   • Diverticulosis    • Erectile disorder due to 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    • GERD (gastroesophageal reflux disease)    • Hyperlipidemia    • Hypertension    • Insomnia        Past Surgical History:   Procedure Laterality Date   • CHOLECYSTECTOMY         Social History     Socioeconomic History   • Marital status:      Spouse name: Not on file   • Number of children: Not on file   • Years of education: Not on file   • Highest education level: Not on file   Occupational History   • Occupation: retired   Tobacco Use   • Smoking status: Former Smoker   • Smokeless tobacco: Never Used   Substance and Sexual Activity   • Alcohol use: Yes   • Drug use: No       Family History   Problem Relation Age of Onset   • No Known Problems Father    • No Known Problems Mother          Temp 97.4 °F (36.3 °C)   Ht 170.2 cm (67\")   Wt 84.4 kg (186 lb)   BMI 29.13 kg/m²       Physical Exam  Constitutional:  They  appear well-developed and well-nourished. There are no obvious deformities. No distress. The vital signs are reviewed  Pulmonary/Chest: Effort normal.   GI: Soft. The patient exhibits no distension and no mass. There is no tenderness. There is no rebound and no guarding. No hernia.   Neurological: Patient is alert and oriented to person, place, and time.   Skin: Skin is warm and dry. Not diaphoretic.   Psychiatric:  normal mood and affect. Not agitated.         Data  Results for orders placed or performed in visit on 06/23/20   POC Urinalysis Dipstick, Multipro   Result Value Ref Range    Color Yellow Yellow, Straw, Dark Yellow, Thu    Clarity, UA Clear Clear    Glucose, UA Negative Negative, 1000 mg/dL (3+) mg/dL    Bilirubin Negative Negative    Ketones, UA Negative Negative    Specific Gravity  1.010 1.005 - 1.030    Blood, UA Negative Negative    pH, Urine 7.0 5.0 - 8.0    Protein, POC Negative Negative mg/dL    Urobilinogen, UA Normal Normal    Nitrite, UA " Negative Negative    Leukocytes Negative Negative       Study Result     EXAMINATION: MRI PELVIS W WO CONTRAST- 6/19/2020 11:35 AM CDT     HISTORY: Prostate cancer, active surveillance     TECHNIQUE: Multiplanar, multisequence imaging was performed through the  prostate before and after the administration of IV contrast. Evaluation  with [employer] software was performed.     FINDINGS:  The prostate gland measures 5.6 x 5.3 x 4.7 cm. The gland volume is  69.44 cc.     Multiple well-circumscribed nodules are seen within the transition zone,  compatible with BPH.     Lesion 1:  Location: Right apical lateral peripheral zone  Measurements: 0.4 x 0.3 x 0.8 cm  Imaging features: On T2, it is homogeneously hypointense, hypointense on  ADC, markedly hyperintense on DWI, and demonstrates early arterial  enhancement on the dynamic perfusion.  ADC value :562  PI-RADS: 4     Staging:  Capsule: Appears intact.  Seminal vesicles: Unremarkable.  Lymph nodes: No suspicious lymphadenopathy identified.  Pelvic bones: No focal marrow signal abnormality is identified.     There is a fat-containing right inguinal hernia. Scattered colonic  diverticula are seen. Urinary bladder is mostly decompressed and  therefore not well evaluated.     IMPRESSION:  Impression:     1. The prostate gland volume is 69.44 cc. The PSA density is 0.08  ng/ml2.  2. There there is once PI-RADS 4 lesion as detailed above.  3. Findings of benign prostatic hyperplasia.     This report was finalized on 06/22/2020 11:54 by Dr. Khanh Braga MD.     These images were made available to me to review independently.  I also reviewed the radiologist's report described above with regard to the urologic findings.  This is consistent with the area that showed Pavel grade 3+3 and 5% last time.  We can now see this lesion on MRI./Watson Sheehan MD            Assessment and Plan  Diagnoses and all orders for this visit:    Prostate cancer (CMS/[facility])  -     POC Urinalysis  Dipstick, Multipro  -     Case Request; Standing  -     Case Request    Other orders  -     Follow Anesthesia Guidelines / Standing Orders; Future  -     Provide NPO Instructions to Patient; Future      Based on previous diagnosis of prostate which is low risk and being followed by active surveillance,   clinical suspicion, and results of the MRI prostate, I have recommended that the patient undergo a repeat transrectal ultrasound of the prostate.  This time we will use the [employer] system to perform a fusion biopsy. It is explained as follows; Fusion MRI-ultrasound biopsy is a technique that overlays previously acquired MRI images of the prostate with real-time transrectal ultrasonography (TRUS) to allow clinicians to directly target suspicious areas identified on MRI. It combines ability to target in real-time ultrasound with the more detailed MR images to allow for a more accurate diagnosis of prostate cancer. The technique involves acquisition of the MR images that are reviewed by  radiologists to identify areas of interest for targeting the biopsy. I have  the ability to perform direct biopsy of the regions of special concern, in addition to sampling of other areas within the prostate.  It is explained that ultrasound-guided sampling of area is not suspicious of MRI is highly recommended because the sensitivity of MRI of the prostate detecting lesions is not adequate enough to diagnose all cancers.  The risks of possibly missing prostate cancer even with all this advanced technology and adequate sampling of the prostate gland is possible.  I also explained it is possible to detect some prostate cancers that may not be clinically significant.  We also discussed the possibility of infection that can be severe enough to lead to sepsis, blood in the urine, hematochezia, and prolonged hematospermia.  We also discussed that erectile dysfunction, usually transient, can occur after biopsy especially with a higher number  of biopsies.      (Please note that portions of this note were completed with a voice recognition program.)  Watson Sheehan MD  06/23/20  10:10

## 2020-06-19 ENCOUNTER — HOSPITAL ENCOUNTER (OUTPATIENT)
Dept: MRI IMAGING | Facility: HOSPITAL | Age: 75
Discharge: HOME | End: 2020-06-19
Admitting: UROLOGY

## 2020-06-19 DIAGNOSIS — C61 PROSTATE CANCER (HCC): ICD-10-CM

## 2020-06-19 LAB — CREAT BLDA-MCNC: 1.2 MG/DL (ref 0.6–1.3)

## 2020-06-19 PROCEDURE — 0 GADOBENATE DIMEGLUMINE 529 MG/ML SOLUTION: Performed by: UROLOGY

## 2020-06-19 PROCEDURE — 82565 ASSAY OF CREATININE: CPT

## 2020-06-19 PROCEDURE — 72197 MRI PELVIS W/O & W/DYE: CPT

## 2020-06-19 PROCEDURE — A9577 INJ MULTIHANCE: HCPCS | Performed by: UROLOGY

## 2020-06-19 RX ADMIN — GADOBENATE DIMEGLUMINE 17 ML: 529 INJECTION, SOLUTION INTRAVENOUS at 10:12

## 2020-06-20 LAB — PSA SERPL-MCNC: 5.86 NG/ML (ref 0–4)

## 2020-06-23 ENCOUNTER — TRANSCRIBE ORDERS (OUTPATIENT)
Dept: ADMINISTRATIVE | Facility: HOSPITAL | Age: 75
End: 2020-06-23

## 2020-06-23 ENCOUNTER — OFFICE VISIT (OUTPATIENT)
Dept: UROLOGY | Facility: CLINIC | Age: 75
End: 2020-06-23

## 2020-06-23 VITALS — HEIGHT: 67 IN | BODY MASS INDEX: 29.19 KG/M2 | WEIGHT: 186 LBS | TEMPERATURE: 97.4 F

## 2020-06-23 DIAGNOSIS — Z01.818 PRE-OP TESTING: Primary | ICD-10-CM

## 2020-06-23 DIAGNOSIS — C61 PROSTATE CANCER (HCC): Primary | ICD-10-CM

## 2020-06-23 DIAGNOSIS — C61 PROSTATE CANCER (HCC): ICD-10-CM

## 2020-06-23 PROCEDURE — 99213 OFFICE O/P EST LOW 20 MIN: CPT | Performed by: UROLOGY

## 2020-06-23 PROCEDURE — 81003 URINALYSIS AUTO W/O SCOPE: CPT | Performed by: UROLOGY

## 2020-06-23 RX ORDER — CEPHALEXIN 500 MG/1
500 CAPSULE ORAL 2 TIMES DAILY
Qty: 4 CAPSULE | Refills: 0 | Status: SHIPPED | OUTPATIENT
Start: 2020-06-23 | End: 2020-06-23 | Stop reason: SDUPTHER

## 2020-06-23 RX ORDER — CEPHALEXIN 500 MG/1
500 CAPSULE ORAL 2 TIMES DAILY
Qty: 4 CAPSULE | Refills: 0 | Status: SHIPPED | OUTPATIENT
Start: 2020-06-23 | End: 2020-06-25

## 2020-06-24 ENCOUNTER — APPOINTMENT (OUTPATIENT)
Dept: PREADMISSION TESTING | Facility: HOSPITAL | Age: 75
End: 2020-06-24

## 2020-06-24 ENCOUNTER — TRANSCRIBE ORDERS (OUTPATIENT)
Dept: ADMINISTRATIVE | Facility: HOSPITAL | Age: 75
End: 2020-06-24

## 2020-06-24 VITALS
HEIGHT: 67 IN | RESPIRATION RATE: 16 BRPM | DIASTOLIC BLOOD PRESSURE: 80 MMHG | BODY MASS INDEX: 30.07 KG/M2 | SYSTOLIC BLOOD PRESSURE: 124 MMHG | OXYGEN SATURATION: 97 % | HEART RATE: 65 BPM | WEIGHT: 191.58 LBS

## 2020-06-24 DIAGNOSIS — Z01.818 PRE-OP TESTING: Primary | ICD-10-CM

## 2020-06-24 LAB
ANION GAP SERPL CALCULATED.3IONS-SCNC: 11 MMOL/L (ref 5–15)
BUN BLD-MCNC: 14 MG/DL (ref 8–23)
BUN/CREAT SERPL: 13.1 (ref 7–25)
CALCIUM SPEC-SCNC: 9.8 MG/DL (ref 8.6–10.5)
CHLORIDE SERPL-SCNC: 104 MMOL/L (ref 98–107)
CO2 SERPL-SCNC: 26 MMOL/L (ref 22–29)
CREAT BLD-MCNC: 1.07 MG/DL (ref 0.76–1.27)
DEPRECATED RDW RBC AUTO: 36.9 FL (ref 37–54)
ERYTHROCYTE [DISTWIDTH] IN BLOOD BY AUTOMATED COUNT: 12.2 % (ref 12.3–15.4)
GFR SERPL CREATININE-BSD FRML MDRD: 68 ML/MIN/1.73
GLUCOSE BLD-MCNC: 81 MG/DL (ref 65–99)
HCT VFR BLD AUTO: 44.4 % (ref 37.5–51)
HGB BLD-MCNC: 16 G/DL (ref 13–17.7)
MCH RBC QN AUTO: 30.2 PG (ref 26.6–33)
MCHC RBC AUTO-ENTMCNC: 36 G/DL (ref 31.5–35.7)
MCV RBC AUTO: 83.9 FL (ref 79–97)
PLATELET # BLD AUTO: 228 10*3/MM3 (ref 140–450)
PMV BLD AUTO: 11.2 FL (ref 6–12)
POTASSIUM BLD-SCNC: 4.3 MMOL/L (ref 3.5–5.2)
RBC # BLD AUTO: 5.29 10*6/MM3 (ref 4.14–5.8)
SODIUM BLD-SCNC: 141 MMOL/L (ref 136–145)
WBC NRBC COR # BLD: 6.17 10*3/MM3 (ref 3.4–10.8)

## 2020-06-24 PROCEDURE — 93010 ELECTROCARDIOGRAM REPORT: CPT | Performed by: INTERNAL MEDICINE

## 2020-06-24 PROCEDURE — 36415 COLL VENOUS BLD VENIPUNCTURE: CPT

## 2020-06-24 PROCEDURE — 93005 ELECTROCARDIOGRAM TRACING: CPT

## 2020-06-24 PROCEDURE — 85027 COMPLETE CBC AUTOMATED: CPT | Performed by: UROLOGY

## 2020-06-24 PROCEDURE — 80048 BASIC METABOLIC PNL TOTAL CA: CPT | Performed by: UROLOGY

## 2020-06-24 NOTE — DISCHARGE INSTRUCTIONS
DAY OF SURGERY INSTRUCTIONS        YOUR SURGEON: LORRIE CARROLL    PROCEDURE: PROSTATE ULTRASOUND URONAV FUSION BIOPSY    DATE OF SURGERY: June 29, 2020    ARRIVAL TIME: AS DIRECTED BY OFFICE    YOU MAY TAKE THE FOLLOWING MEDICATION(S) THE MORNING OF SURGERY WITH A SIP OF WATER: METOPROLOL    ALL OTHER HOME MEDICATIONS CHECK WITH YOUR DOCTOR    DO NOT TAKE ANY ERECTILE DYSFUNCTION MEDICATIONS (EX:  CIALIS, VIAGRA) 24 HOURS PRIOR TO SURGERY              MANAGING PAIN AFTER SURGERY    We know you are probably wondering what your pain will be like after surgery.  Following surgery it is unrealistic to expect you will not have pain.   Pain is how our bodies let us know that something is wrong or cautions us to be careful.  That said, our goal is to make your pain tolerable.    Methods we may use to treat your pain include (oral or IV medications, PCAs, epidurals, nerve blocks, etc.)   While some procedures require IV pain medications for a short time after surgery, transitioning to pain medications by mouth allows for better management of pain.   Your nurse will encourage you to take oral pain medications whenever possible.  IV medications work almost immediately, but only last a short while.  Taking medications by mouth allows for a more constant level of medication in your blood stream for a longer period of time.      Once your pain is out of control it is harder to get back under control.  It is important you are aware when your next dose of pain medication is due.  If you are admitted, your nurse may write the time of your next dose on the white board in your room to help you remember.      We are interested in your pain and encourage you to inform us about aggravating factors during your visit.   Many times a simple repositioning every few hours can make a big difference.    If your physician says it is okay, do not let your pain prevent you from getting out of bed. Be sure to call your nurse for assistance prior  to getting up so you do not fall.      Before surgery, please decide your tolerable pain goal.  These faces help describe the pain ratings we use on a 0-10 scale.   Be prepared to tell us your goal and whether or not you take pain or anxiety medications at home.      BEFORE YOU COME TO THE HOSPITAL  (Pre-op instructions)  • Do not eat, drink, smoke or chew gum after midnight the night before surgery.  This also includes no mints.  • Morning of surgery take only the medicines you have been instructed with a sip of water unless otherwise instructed  by your physician.  • Do not shave, wear makeup or dark nail polish.  • Remove all jewelry including rings.  • Leave anything you consider valuable at home.  • Leave your suitcase in the car until after your surgery.  • Bring the following with you if applicable:  o Picture ID and insurance, Medicare or Medicaid cards  o Co-pay/deductible required by insurance (cash, check, credit card)  o Copy of advance directive, living will or power-of- documents if not brought to PAT  o CPAP or BIPAP mask and tubing  o Relaxation aids ( book, magazine), etc.  o Hearing aids                                 ON THE DAY OF SURGERY  · On the day of surgery check in at registration located at the main entrance of the hospital.   ? You will be registered and given a beeper with instructions where to wait in the main lobby.  ? When your beeper lights up and vibrates a member of the Outpatient Surgery staff will meet you at the double doors under the stair steps and escort you to your preoperative room.   · You may have cloth compression devices placed on your legs. These help to prevent blood clots and reduce swelling in your legs.  · An IV may be inserted into one of your veins.  · In the operating room, you may be given one or more of the following:  ? A medicine to help you relax (sedative).  ? A medicine to numb the area (local anesthetic).  ? A medicine to make you fall asleep  "(general anesthetic).  ? A medicine that is injected into an area of your body to numb everything below the injection site (regional anesthetic).  · Your surgical site will be marked or identified.  · You may be given an antibiotic through your IV to help prevent infection.  Contact a health care provider if you:  · Develop a fever of more than 100.4°F (38°C) or other feelings of illness during the 48 hours before your surgery.  · Have symptoms that get worse.  Have questions or concerns about your surgery    General Anesthesia/Surgery, Adult  General anesthesia is the use of medicines to make a person \"go to sleep\" (unconscious) for a medical procedure. General anesthesia must be used for certain procedures, and is often recommended for procedures that:  · Last a long time.  · Require you to be still or in an unusual position.  · Are major and can cause blood loss.  The medicines used for general anesthesia are called general anesthetics. As well as making you unconscious for a certain amount of time, these medicines:  · Prevent pain.  · Control your blood pressure.  · Relax your muscles.  Tell a health care provider about:  · Any allergies you have.  · All medicines you are taking, including vitamins, herbs, eye drops, creams, and over-the-counter medicines.  · Any problems you or family members have had with anesthetic medicines.  · Types of anesthetics you have had in the past.  · Any blood disorders you have.  · Any surgeries you have had.  · Any medical conditions you have.  · Any recent upper respiratory, chest, or ear infections.  · Any history of:  ? Heart or lung conditions, such as heart failure, sleep apnea, asthma, or chronic obstructive pulmonary disease (COPD).  ?  service.  ? Depression or anxiety.  · Any tobacco or drug use, including marijuana or alcohol use.  · Whether you are pregnant or may be pregnant.  What are the risks?  Generally, this is a safe procedure. However, problems may " occur, including:  · Allergic reaction.  · Lung and heart problems.  · Inhaling food or liquid from the stomach into the lungs (aspiration).  · Nerve injury.  · Air in the bloodstream, which can lead to stroke.  · Extreme agitation or confusion (delirium) when you wake up from the anesthetic.  · Waking up during your procedure and being unable to move. This is rare.  These problems are more likely to develop if you are having a major surgery or if you have an advanced or serious medical condition. You can prevent some of these complications by answering all of your health care provider's questions thoroughly and by following all instructions before your procedure.  General anesthesia can cause side effects, including:  · Nausea or vomiting.  · A sore throat from the breathing tube.  · Hoarseness.  · Wheezing or coughing.  · Shaking chills.  · Tiredness.  · Body aches.  · Anxiety.  · Sleepiness or drowsiness.  · Confusion or agitation.  RISKS AND COMPLICATIONS OF SURGERY  Your health care provider will discuss possible risks and complications with you before surgery. Common risks and complications include:    · Problems due to the use of anesthetics.  · Blood loss and replacement (does not apply to minor surgical procedures).  · Temporary increase in pain due to surgery.  · Uncorrected pain or problems that the surgery was meant to correct.  · Infection.  · New damage.    What happens before the procedure?    Medicines  Ask your health care provider about:  · Changing or stopping your regular medicines. This is especially important if you are taking diabetes medicines or blood thinners.  · Taking medicines such as aspirin and ibuprofen. These medicines can thin your blood. Do not take these medicines unless your health care provider tells you to take them.  · Taking over-the-counter medicines, vitamins, herbs, and supplements. Do not take these during the week before your procedure unless your health care provider  approves them.  General instructions  · Starting 3-6 weeks before the procedure, do not use any products that contain nicotine or tobacco, such as cigarettes and e-cigarettes. If you need help quitting, ask your health care provider.  · If you brush your teeth on the morning of the procedure, make sure to spit out all of the toothpaste.  · Tell your health care provider if you become ill or develop a cold, cough, or fever.  · If instructed by your health care provider, bring your sleep apnea device with you on the day of your surgery (if applicable).  · Ask your health care provider if you will be going home the same day, the following day, or after a longer hospital stay.  ? Plan to have someone take you home from the hospital or clinic.  ? Plan to have a responsible adult care for you for at least 24 hours after you leave the hospital or clinic. This is important.  What happens during the procedure?  · You will be given anesthetics through both of the following:  ? A mask placed over your nose and mouth.  ? An IV in one of your veins.  · You may receive a medicine to help you relax (sedative).  · After you are unconscious, a breathing tube may be inserted down your throat to help you breathe. This will be removed before you wake up.  · An anesthesia specialist will stay with you throughout your procedure. He or she will:  ? Keep you comfortable and safe by continuing to give you medicines and adjusting the amount of medicine that you get.  ? Monitor your blood pressure, pulse, and oxygen levels to make sure that the anesthetics do not cause any problems.  The procedure may vary among health care providers and hospitals.  What happens after the procedure?  · Your blood pressure, temperature, heart rate, breathing rate, and blood oxygen level will be monitored until the medicines you were given have worn off.  · You will wake up in a recovery area. You may wake up slowly.  · If you feel anxious or agitated, you may  be given medicine to help you calm down.  · If you will be going home the same day, your health care provider may check to make sure you can walk, drink, and urinate.  · Your health care provider will treat any pain or side effects you have before you go home.  · Do not drive for 24 hours if you were given a sedative.  Summary  · General anesthesia is used to keep you still and prevent pain during a procedure.  · It is important to tell your healthcare provider about your medical history and any surgeries you have had, and previous experience with anesthesia.  · Follow your healthcare provider’s instructions about when to stop eating, drinking, or taking certain medicines before your procedure.  · Plan to have someone take you home from the hospital or clinic.  This information is not intended to replace advice given to you by your health care provider. Make sure you discuss any questions you have with your health care provider.  Document Released: 03/26/2009 Document Revised: 08/03/2018 Document Reviewed: 08/03/2018  [employer] Patient Education © 2019 [employer]      Fall Prevention in Hospitals, Adult  As a hospital patient, your condition and the treatments you receive can increase your risk for falls. Some additional risk factors for falls in a hospital include:  · Being in an unfamiliar environment.  · Being on bed rest.  · Your surgery.  · Taking certain medicines.  · Your tubing requirements, such as intravenous (IV) therapy or catheters.  It is important that you learn how to decrease fall risks while at the hospital. Below are important tips that can help prevent falls.  SAFETY TIPS FOR PREVENTING FALLS  Talk about your risk of falling.  · Ask your health care provider why you are at risk for falling. Is it your medicine, illness, tubing placement, or something else?  · Make a plan with your health care provider to keep you safe from falls.  · Ask your health care provider or pharmacist about  side effects of your medicines. Some medicines can make you dizzy or affect your coordination.  Ask for help.  · Ask for help before getting out of bed. You may need to press your call button.  · Ask for assistance in getting safely to the toilet.  · Ask for a walker or cane to be put at your bedside. Ask that most of the side rails on your bed be placed up before your health care provider leaves the room.  · Ask family or friends to sit with you.  · Ask for things that are out of your reach, such as your glasses, hearing aids, telephone, bedside table, or call button.  Follow these tips to avoid falling:  · Stay lying or seated, rather than standing, while waiting for help.  · Wear rubber-soled slippers or shoes whenever you walk in the hospital.  · Avoid quick, sudden movements.  ¨ Change positions slowly.  ¨ Sit on the side of your bed before standing.  ¨ Stand up slowly and wait before you start to walk.  · Let your health care provider know if there is a spill on the floor.  · Pay careful attention to the medical equipment, electrical cords, and tubes around you.  · When you need help, use your call button by your bed or in the bathroom. Wait for one of your health care providers to help you.  · If you feel dizzy or unsure of your footing, return to bed and wait for assistance.  · Avoid being distracted by the TV, telephone, or another person in your room.  · Do not lean or support yourself on rolling objects, such as IV poles or bedside tables.     This information is not intended to replace advice given to you by your health care provider. Make sure you discuss any questions you have with your health care provider.     Document Released: 12/15/2001 Document Revised: 01/08/2016 Document Reviewed: 08/25/2013  [employer] Interactive Patient Education ©2016 [employer]            PATIENT/FAMILY/RESPONSIBLE PARTY VERBALIZES UNDERSTANDING OF ABOVE EDUCATION.  COPY OF PAIN SCALE GIVEN AND REVIEWED WITH VERBALIZED  UNDERSTANDING.

## 2020-06-24 NOTE — PAT
Spoke to Little at the [facility] testing center and she said do not test the patient today because it would be too early (even though we had his chart marked to covid test). The testing center will call him to set up a testing time for this Friday.

## 2020-06-26 ENCOUNTER — LAB (OUTPATIENT)
Dept: LAB | Facility: HOSPITAL | Age: 75
End: 2020-06-26

## 2020-06-26 PROCEDURE — C9803 HOPD COVID-19 SPEC COLLECT: HCPCS | Performed by: UROLOGY

## 2020-06-26 PROCEDURE — U0003 INFECTIOUS AGENT DETECTION BY NUCLEIC ACID (DNA OR RNA); SEVERE ACUTE RESPIRATORY SYNDROME CORONAVIRUS 2 (SARS-COV-2) (CORONAVIRUS DISEASE [COVID-19]), AMPLIFIED PROBE TECHNIQUE, MAKING USE OF HIGH THROUGHPUT TECHNOLOGIES AS DESCRIBED BY CMS-2020-01-R: HCPCS | Performed by: UROLOGY

## 2020-06-27 LAB
COVID LABCORP PRIORITY: NORMAL
SARS-COV-2 RNA RESP QL NAA+PROBE: NOT DETECTED

## 2020-06-29 ENCOUNTER — ANESTHESIA EVENT (OUTPATIENT)
Dept: PERIOP | Facility: HOSPITAL | Age: 75
End: 2020-06-29

## 2020-06-29 ENCOUNTER — ANESTHESIA (OUTPATIENT)
Dept: PERIOP | Facility: HOSPITAL | Age: 75
End: 2020-06-29

## 2020-06-29 ENCOUNTER — HOSPITAL ENCOUNTER (OUTPATIENT)
Facility: HOSPITAL | Age: 75
Setting detail: HOSPITAL OUTPATIENT SURGERY
Discharge: HOME | End: 2020-06-29
Attending: UROLOGY | Admitting: UROLOGY

## 2020-06-29 VITALS
TEMPERATURE: 97.7 F | SYSTOLIC BLOOD PRESSURE: 145 MMHG | DIASTOLIC BLOOD PRESSURE: 84 MMHG | OXYGEN SATURATION: 97 % | HEART RATE: 67 BPM | RESPIRATION RATE: 16 BRPM

## 2020-06-29 DIAGNOSIS — C61 PROSTATE CANCER (HCC): ICD-10-CM

## 2020-06-29 PROCEDURE — 25010000002 GENTAMICIN PER 80 MG: Performed by: UROLOGY

## 2020-06-29 PROCEDURE — 55700 PR PROSTATE NEEDLE BIOPSY ANY APPROACH: CPT | Performed by: UROLOGY

## 2020-06-29 PROCEDURE — 25010000002 FENTANYL CITRATE (PF) 100 MCG/2ML SOLUTION: Performed by: NURSE ANESTHETIST, CERTIFIED REGISTERED

## 2020-06-29 PROCEDURE — 25010000003 LIDOCAINE 1 % SOLUTION: Performed by: UROLOGY

## 2020-06-29 PROCEDURE — 88341 IMHCHEM/IMCYTCHM EA ADD ANTB: CPT | Performed by: UROLOGY

## 2020-06-29 PROCEDURE — 25010000002 PROPOFOL 10 MG/ML EMULSION: Performed by: NURSE ANESTHETIST, CERTIFIED REGISTERED

## 2020-06-29 PROCEDURE — G0416 PROSTATE BIOPSY, ANY MTHD: HCPCS | Performed by: UROLOGY

## 2020-06-29 PROCEDURE — 25010000002 SUCCINYLCHOLINE PER 20 MG: Performed by: NURSE ANESTHETIST, CERTIFIED REGISTERED

## 2020-06-29 PROCEDURE — 76942 ECHO GUIDE FOR BIOPSY: CPT | Performed by: UROLOGY

## 2020-06-29 PROCEDURE — 25010000002 ONDANSETRON PER 1 MG: Performed by: NURSE ANESTHETIST, CERTIFIED REGISTERED

## 2020-06-29 RX ORDER — SODIUM CHLORIDE 0.9 % (FLUSH) 0.9 %
3 SYRINGE (ML) INJECTION AS NEEDED
Status: DISCONTINUED | OUTPATIENT
Start: 2020-06-29 | End: 2020-06-29 | Stop reason: HOSPADM

## 2020-06-29 RX ORDER — ONDANSETRON 2 MG/ML
4 INJECTION INTRAMUSCULAR; INTRAVENOUS ONCE AS NEEDED
Status: DISCONTINUED | OUTPATIENT
Start: 2020-06-29 | End: 2020-06-29 | Stop reason: HOSPADM

## 2020-06-29 RX ORDER — LIDOCAINE HYDROCHLORIDE 10 MG/ML
INJECTION, SOLUTION INFILTRATION; PERINEURAL AS NEEDED
Status: DISCONTINUED | OUTPATIENT
Start: 2020-06-29 | End: 2020-06-29 | Stop reason: HOSPADM

## 2020-06-29 RX ORDER — SODIUM CHLORIDE 0.9 % (FLUSH) 0.9 %
10 SYRINGE (ML) INJECTION AS NEEDED
Status: DISCONTINUED | OUTPATIENT
Start: 2020-06-29 | End: 2020-06-29 | Stop reason: HOSPADM

## 2020-06-29 RX ORDER — LABETALOL HYDROCHLORIDE 5 MG/ML
5 INJECTION, SOLUTION INTRAVENOUS
Status: DISCONTINUED | OUTPATIENT
Start: 2020-06-29 | End: 2020-06-29 | Stop reason: HOSPADM

## 2020-06-29 RX ORDER — FLUMAZENIL 0.1 MG/ML
0.2 INJECTION INTRAVENOUS AS NEEDED
Status: DISCONTINUED | OUTPATIENT
Start: 2020-06-29 | End: 2020-06-29 | Stop reason: HOSPADM

## 2020-06-29 RX ORDER — PROPOFOL 10 MG/ML
VIAL (ML) INTRAVENOUS AS NEEDED
Status: DISCONTINUED | OUTPATIENT
Start: 2020-06-29 | End: 2020-06-29 | Stop reason: SURG

## 2020-06-29 RX ORDER — SODIUM CHLORIDE 0.9 % (FLUSH) 0.9 %
10 SYRINGE (ML) INJECTION EVERY 12 HOURS SCHEDULED
Status: DISCONTINUED | OUTPATIENT
Start: 2020-06-29 | End: 2020-06-29 | Stop reason: HOSPADM

## 2020-06-29 RX ORDER — OXYCODONE AND ACETAMINOPHEN 7.5; 325 MG/1; MG/1
2 TABLET ORAL EVERY 4 HOURS PRN
Status: DISCONTINUED | OUTPATIENT
Start: 2020-06-29 | End: 2020-06-29 | Stop reason: HOSPADM

## 2020-06-29 RX ORDER — SODIUM CHLORIDE, SODIUM LACTATE, POTASSIUM CHLORIDE, CALCIUM CHLORIDE 600; 310; 30; 20 MG/100ML; MG/100ML; MG/100ML; MG/100ML
9 INJECTION, SOLUTION INTRAVENOUS CONTINUOUS
Status: DISCONTINUED | OUTPATIENT
Start: 2020-06-29 | End: 2020-06-29 | Stop reason: HOSPADM

## 2020-06-29 RX ORDER — SUCCINYLCHOLINE CHLORIDE 20 MG/ML
INJECTION INTRAMUSCULAR; INTRAVENOUS AS NEEDED
Status: DISCONTINUED | OUTPATIENT
Start: 2020-06-29 | End: 2020-06-29 | Stop reason: SURG

## 2020-06-29 RX ORDER — HYDROCODONE BITARTRATE AND ACETAMINOPHEN 5; 325 MG/1; MG/1
1 TABLET ORAL ONCE AS NEEDED
Status: DISCONTINUED | OUTPATIENT
Start: 2020-06-29 | End: 2020-06-29 | Stop reason: HOSPADM

## 2020-06-29 RX ORDER — DEXTROSE MONOHYDRATE 25 G/50ML
12.5 INJECTION, SOLUTION INTRAVENOUS AS NEEDED
Status: DISCONTINUED | OUTPATIENT
Start: 2020-06-29 | End: 2020-06-29 | Stop reason: HOSPADM

## 2020-06-29 RX ORDER — FENTANYL CITRATE 50 UG/ML
25 INJECTION, SOLUTION INTRAMUSCULAR; INTRAVENOUS
Status: DISCONTINUED | OUTPATIENT
Start: 2020-06-29 | End: 2020-06-29 | Stop reason: HOSPADM

## 2020-06-29 RX ORDER — LIDOCAINE HYDROCHLORIDE 40 MG/ML
SOLUTION TOPICAL AS NEEDED
Status: DISCONTINUED | OUTPATIENT
Start: 2020-06-29 | End: 2020-06-29 | Stop reason: SURG

## 2020-06-29 RX ORDER — IBUPROFEN 600 MG/1
600 TABLET ORAL ONCE AS NEEDED
Status: DISCONTINUED | OUTPATIENT
Start: 2020-06-29 | End: 2020-06-29 | Stop reason: HOSPADM

## 2020-06-29 RX ORDER — ONDANSETRON 2 MG/ML
INJECTION INTRAMUSCULAR; INTRAVENOUS AS NEEDED
Status: DISCONTINUED | OUTPATIENT
Start: 2020-06-29 | End: 2020-06-29 | Stop reason: SURG

## 2020-06-29 RX ORDER — LIDOCAINE HYDROCHLORIDE 10 MG/ML
0.5 INJECTION, SOLUTION EPIDURAL; INFILTRATION; INTRACAUDAL; PERINEURAL ONCE AS NEEDED
Status: DISCONTINUED | OUTPATIENT
Start: 2020-06-29 | End: 2020-06-29 | Stop reason: HOSPADM

## 2020-06-29 RX ORDER — OXYCODONE AND ACETAMINOPHEN 10; 325 MG/1; MG/1
1 TABLET ORAL ONCE AS NEEDED
Status: DISCONTINUED | OUTPATIENT
Start: 2020-06-29 | End: 2020-06-29 | Stop reason: HOSPADM

## 2020-06-29 RX ORDER — SODIUM CHLORIDE, SODIUM LACTATE, POTASSIUM CHLORIDE, CALCIUM CHLORIDE 600; 310; 30; 20 MG/100ML; MG/100ML; MG/100ML; MG/100ML
1000 INJECTION, SOLUTION INTRAVENOUS CONTINUOUS
Status: DISCONTINUED | OUTPATIENT
Start: 2020-06-29 | End: 2020-06-29 | Stop reason: HOSPADM

## 2020-06-29 RX ORDER — EPHEDRINE SULFATE 50 MG/ML
INJECTION INTRAVENOUS AS NEEDED
Status: DISCONTINUED | OUTPATIENT
Start: 2020-06-29 | End: 2020-06-29 | Stop reason: SURG

## 2020-06-29 RX ORDER — GENTAMICIN SULFATE 80 MG/100ML
80 INJECTION, SOLUTION INTRAVENOUS ONCE
Status: DISCONTINUED | OUTPATIENT
Start: 2020-06-29 | End: 2020-06-29 | Stop reason: HOSPADM

## 2020-06-29 RX ORDER — HYDROCODONE BITARTRATE AND ACETAMINOPHEN 5; 325 MG/1; MG/1
1 TABLET ORAL EVERY 8 HOURS PRN
Qty: 6 TABLET | Refills: 0 | Status: SHIPPED | OUTPATIENT
Start: 2020-06-29 | End: 2020-08-18

## 2020-06-29 RX ORDER — FENTANYL CITRATE 50 UG/ML
INJECTION, SOLUTION INTRAMUSCULAR; INTRAVENOUS AS NEEDED
Status: DISCONTINUED | OUTPATIENT
Start: 2020-06-29 | End: 2020-06-29 | Stop reason: SURG

## 2020-06-29 RX ORDER — ROCURONIUM BROMIDE 10 MG/ML
INJECTION, SOLUTION INTRAVENOUS AS NEEDED
Status: DISCONTINUED | OUTPATIENT
Start: 2020-06-29 | End: 2020-06-29 | Stop reason: SURG

## 2020-06-29 RX ORDER — NALOXONE HCL 0.4 MG/ML
0.4 VIAL (ML) INJECTION AS NEEDED
Status: DISCONTINUED | OUTPATIENT
Start: 2020-06-29 | End: 2020-06-29 | Stop reason: HOSPADM

## 2020-06-29 RX ADMIN — SUCCINYLCHOLINE CHLORIDE 100 MG: 20 INJECTION, SOLUTION INTRAMUSCULAR; INTRAVENOUS at 10:09

## 2020-06-29 RX ADMIN — LIDOCAINE HYDROCHLORIDE 80 MG: 20 INJECTION, SOLUTION INTRAVENOUS at 10:09

## 2020-06-29 RX ADMIN — EPHEDRINE SULFATE 15 MG: 50 INJECTION INTRAVENOUS at 10:27

## 2020-06-29 RX ADMIN — ROCURONIUM BROMIDE 5 MG: 10 INJECTION INTRAVENOUS at 10:09

## 2020-06-29 RX ADMIN — PROPOFOL 150 MG: 10 INJECTION, EMULSION INTRAVENOUS at 10:09

## 2020-06-29 RX ADMIN — FENTANYL CITRATE 100 MCG: 50 INJECTION, SOLUTION INTRAMUSCULAR; INTRAVENOUS at 10:09

## 2020-06-29 RX ADMIN — SODIUM CHLORIDE, POTASSIUM CHLORIDE, SODIUM LACTATE AND CALCIUM CHLORIDE 1000 ML: 600; 310; 30; 20 INJECTION, SOLUTION INTRAVENOUS at 06:45

## 2020-06-29 RX ADMIN — LIDOCAINE HYDROCHLORIDE 1 EACH: 40 SOLUTION TOPICAL at 10:10

## 2020-06-29 RX ADMIN — ONDANSETRON HYDROCHLORIDE 4 MG: 2 SOLUTION INTRAMUSCULAR; INTRAVENOUS at 10:22

## 2020-06-29 NOTE — ANESTHESIA PROCEDURE NOTES
Airway  Urgency: elective    Date/Time: 6/29/2020 10:11 AM  Airway not difficult    General Information and Staff    Patient location during procedure: OR  CRNA: Yolanda Riggins CRNA    Indications and Patient Condition  Indications for airway management: airway protection    Preoxygenated: yes  Mask difficulty assessment: 1 - vent by mask    Final Airway Details  Final airway type: endotracheal airway      Successful airway: ETT  Cuffed: yes   Successful intubation technique: direct laryngoscopy  Facilitating devices/methods: intubating stylet  Endotracheal tube insertion site: oral  Blade: Emi  Blade size: 3.5.  ETT size (mm): 7.5  Cormack-Lehane Classification: grade I - full view of glottis  Placement verified by: chest auscultation and capnometry   Cuff volume (mL): 10  Measured from: gums  ETT/EBT to gums (cm): 24  Number of attempts at approach: 1  Assessment: lips, teeth, and gum same as pre-op and atraumatic intubation

## 2020-06-29 NOTE — ANESTHESIA POSTPROCEDURE EVALUATION
Patient: Nehemiah Gomez    Procedure Summary     Date:  06/29/20 Room / Location:   [facility] OR 08 /  [facility]    Anesthesia Start:  1006 Anesthesia Stop:  1049    Procedure:  PROSTATE ULTRASOUND URONAV FUSION BIOPSY. (N/A ) Diagnosis:       Prostate cancer (CMS/HCC)      (Prostate cancer (CMS/HCC) [C61])    Surgeon:  Watson Sheehan MD Provider:  Yolanda Riggins CRNA    Anesthesia Type:  general ASA Status:  2          Anesthesia Type: general    Vitals  Vitals Value Taken Time   /89 6/29/2020 11:17 AM   Temp 97.7 °F (36.5 °C) 6/29/2020 11:15 AM   Pulse 69 6/29/2020 11:19 AM   Resp 14 6/29/2020 11:15 AM   SpO2 96 % 6/29/2020 11:19 AM   Vitals shown include unvalidated device data.        Post Anesthesia Care and Evaluation    Patient location during evaluation: PACU  Patient participation: complete - patient participated  Level of consciousness: awake and alert  Pain management: adequate  Airway patency: patent  Anesthetic complications: No anesthetic complications  PONV Status: none  Cardiovascular status: acceptable and hemodynamically stable  Respiratory status: acceptable  Hydration status: acceptable    Comments: Blood pressure 145/84, pulse 67, temperature 97.7 °F (36.5 °C), temperature source Temporal, resp. rate 16, SpO2 97 %.    Patient discharged from PACU based upon Jessi score. Please see RN notes for further details

## 2020-06-29 NOTE — ANESTHESIA PREPROCEDURE EVALUATION
Anesthesia Evaluation     Patient summary reviewed   no history of anesthetic complications:  NPO Solid Status: > 8 hours             Airway   Mallampati: II  TM distance: >3 FB  Neck ROM: full  Dental      Pulmonary    (+) sleep apnea,   (-) COPD, asthma, not a smoker  Cardiovascular   Exercise tolerance: excellent (>7 METS)    ECG reviewed  Patient on routine beta blocker and Beta blocker given within 24 hours of surgery    (+) hypertension, hyperlipidemia,   (-) pacemaker, past MI, angina, cardiac stents      Neuro/Psych  (-) seizures, TIA, CVA  GI/Hepatic/Renal/Endo    (-) GERD, liver disease, no renal disease, diabetes    Musculoskeletal     Abdominal    Substance History      OB/GYN          Other                        Anesthesia Plan    ASA 2     general     intravenous induction     Anesthetic plan, all risks, benefits, and alternatives have been provided, discussed and informed consent has been obtained with: patient.

## 2020-07-01 LAB
CYTO UR: NORMAL
LAB AP CASE REPORT: NORMAL
PATH REPORT.FINAL DX SPEC: NORMAL
PATH REPORT.GROSS SPEC: NORMAL

## 2020-07-02 ENCOUNTER — TELEPHONE (OUTPATIENT)
Dept: UROLOGY | Facility: CLINIC | Age: 75
End: 2020-07-02

## 2020-07-02 NOTE — TELEPHONE ENCOUNTER
The patient was contacted by phone of the negative biopsy. It is explained that patient should continue close follow up since approximately 5-10% of patients with a diagnosis of prostate cancer have a history of a negative biopsy. I recommended that he undergo PSA and rectal exam in a year.   Watson Sheehan MD  7/2/2020  17:01

## 2020-08-11 DIAGNOSIS — E78.2 MIXED HYPERLIPIDEMIA: ICD-10-CM

## 2020-08-11 DIAGNOSIS — I10 ESSENTIAL HYPERTENSION: Primary | ICD-10-CM

## 2020-08-12 ENCOUNTER — LAB (OUTPATIENT)
Dept: FAMILY MEDICINE CLINIC | Facility: CLINIC | Age: 75
End: 2020-08-12

## 2020-08-13 LAB
ALBUMIN SERPL-MCNC: 4.5 G/DL (ref 3.5–5.2)
ALBUMIN/GLOB SERPL: 2.1 G/DL
ALP SERPL-CCNC: 108 U/L (ref 39–117)
ALT SERPL-CCNC: 13 U/L (ref 1–41)
AST SERPL-CCNC: 16 U/L (ref 1–40)
BILIRUB SERPL-MCNC: 0.3 MG/DL (ref 0–1.2)
BUN SERPL-MCNC: 22 MG/DL (ref 8–23)
BUN/CREAT SERPL: 20 (ref 7–25)
CALCIUM SERPL-MCNC: 9 MG/DL (ref 8.6–10.5)
CHLORIDE SERPL-SCNC: 104 MMOL/L (ref 98–107)
CO2 SERPL-SCNC: 27.1 MMOL/L (ref 22–29)
CREAT SERPL-MCNC: 1.1 MG/DL (ref 0.76–1.27)
GLOBULIN SER CALC-MCNC: 2.1 GM/DL
GLUCOSE SERPL-MCNC: 103 MG/DL (ref 65–99)
POTASSIUM SERPL-SCNC: 3.8 MMOL/L (ref 3.5–5.2)
PROT SERPL-MCNC: 6.6 G/DL (ref 6–8.5)
SODIUM SERPL-SCNC: 142 MMOL/L (ref 136–145)

## 2020-08-16 ENCOUNTER — RESULTS ENCOUNTER (OUTPATIENT)
Dept: FAMILY MEDICINE CLINIC | Facility: CLINIC | Age: 75
End: 2020-08-16

## 2020-08-16 DIAGNOSIS — E78.2 MIXED HYPERLIPIDEMIA: ICD-10-CM

## 2020-08-16 DIAGNOSIS — I10 ESSENTIAL HYPERTENSION: ICD-10-CM

## 2020-08-18 ENCOUNTER — OFFICE VISIT (OUTPATIENT)
Dept: FAMILY MEDICINE CLINIC | Facility: CLINIC | Age: 75
End: 2020-08-18

## 2020-08-18 VITALS
HEART RATE: 66 BPM | WEIGHT: 192 LBS | RESPIRATION RATE: 16 BRPM | DIASTOLIC BLOOD PRESSURE: 82 MMHG | BODY MASS INDEX: 30.13 KG/M2 | OXYGEN SATURATION: 97 % | HEIGHT: 67 IN | SYSTOLIC BLOOD PRESSURE: 134 MMHG

## 2020-08-18 DIAGNOSIS — C61 PROSTATE CANCER (HCC): ICD-10-CM

## 2020-08-18 DIAGNOSIS — K21.9 GERD WITHOUT ESOPHAGITIS: ICD-10-CM

## 2020-08-18 DIAGNOSIS — I10 ESSENTIAL HYPERTENSION: Primary | ICD-10-CM

## 2020-08-18 DIAGNOSIS — B86 SCABIES: ICD-10-CM

## 2020-08-18 PROCEDURE — 99214 OFFICE O/P EST MOD 30 MIN: CPT | Performed by: FAMILY MEDICINE

## 2020-08-18 PROCEDURE — G0439 PPPS, SUBSEQ VISIT: HCPCS | Performed by: FAMILY MEDICINE

## 2020-08-18 NOTE — PROGRESS NOTES
The ABCs of the Annual Wellness Visit  Subsequent Medicare Wellness Visit    Chief Complaint   Patient presents with   • Follow-up     6 mo  htn   • Medicare Wellness-subsequent   • Rash     pt states that he has turkey mites bites on legs       Subjective   History of Present Illness:  Nehemiah Gomez is a 74 y.o. male who presents for a Subsequent Medicare Wellness Visit.    HEALTH RISK ASSESSMENT    Recent Hospitalizations:  No hospitalization(s) within the last year.    Current Medical Providers:  Patient Care Team:  Erwin Ruelas MD as PCP - General  Erwin Ruelas MD as PCP - Claims Attributed  Davion Moreno MD as Consulting Physician (Urology)  Watson Sheehan MD as Consulting Physician (Urology)    Smoking Status:  Social History     Tobacco Use   Smoking Status Former Smoker   • Years: 20.00   • Types: Cigarettes   • Last attempt to quit:    • Years since quittin.6   Smokeless Tobacco Never Used       Alcohol Consumption:  Social History     Substance and Sexual Activity   Alcohol Use Not Currently       Depression Screen:   PHQ-2/PHQ-9 Depression Screening 2020   Little interest or pleasure in doing things 0   Feeling down, depressed, or hopeless 0   Trouble falling or staying asleep, or sleeping too much 0   Feeling tired or having little energy 0   Poor appetite or overeating 0   Feeling bad about yourself - or that you are a failure or have let yourself or your family down 0   Trouble concentrating on things, such as reading the newspaper or watching television 0   Moving or speaking so slowly that other people could have noticed. Or the opposite - being so fidgety or restless that you have been moving around a lot more than usual 0   Thoughts that you would be better off dead, or of hurting yourself in some way 0   Total Score 0   If you checked off any problems, how difficult have these problems made it for you to do your work, take care of things at home, or get  along with other people? Not difficult at all       Fall Risk Screen:  KIKI Fall Risk Assessment was completed, and patient is at LOW risk for falls.Assessment completed on:8/18/2020    Health Habits and Functional and Cognitive Screening:  Functional & Cognitive Status 8/18/2020   Do you have difficulty preparing food and eating? No   Do you have difficulty bathing yourself, getting dressed or grooming yourself? No   Do you have difficulty using the toilet? No   Do you have difficulty moving around from place to place? No   Do you have trouble with steps or getting out of a bed or a chair? No   Current Diet Well Balanced Diet   Dental Exam Up to date   Eye Exam Up to date   Exercise (times per week) 5 times per week   Current Exercise Activities Include Walking   Do you need help using the phone?  No   Are you deaf or do you have serious difficulty hearing?  No   Do you need help with transportation? No   Do you need help shopping? No   Do you need help preparing meals?  No   Do you need help with housework?  No   Do you need help with laundry? No   Do you need help taking your medications? No   Do you need help managing money? No   Do you ever drive or ride in a car without wearing a seat belt? No   Have you felt unusual stress, anger or loneliness in the last month? No   Who do you live with? Other   If you need help, do you have trouble finding someone available to you? No   Have you been bothered in the last four weeks by sexual problems? No   Do you have difficulty concentrating, remembering or making decisions? No         Does the patient have evidence of cognitive impairment? No    Asprin use counseling:Does not need ASA (and currently is not on it)    Age-appropriate Screening Schedule:  Refer to the list below for future screening recommendations based on patient's age, sex and/or medical conditions. Orders for these recommended tests are listed in the plan section. The patient has been provided with a  written plan.    Health Maintenance   Topic Date Due   • TDAP/TD VACCINES (1 - Tdap) 12/15/1956   • ZOSTER VACCINE (2 of 2) 11/26/2016   • INFLUENZA VACCINE  08/01/2020   • LIPID PANEL  02/12/2021   • COLONOSCOPY  03/17/2026          The following portions of the patient's history were reviewed and updated as appropriate: allergies, current medications, past family history, past medical history, past social history, past surgical history and problem list.    Outpatient Medications Prior to Visit   Medication Sig Dispense Refill   • amitriptyline (ELAVIL) 25 MG tablet TAKE 1 TABLET EVERY NIGHT 90 tablet 4   • amLODIPine (NORVASC) 5 MG tablet TAKE 1 TABLET DAILY 90 tablet 3   • aspirin (aspirin) 81 MG EC tablet Take 81 mg by mouth Daily.     • esomeprazole (nexIUM) 40 MG capsule TAKE 1 CAPSULE DAILY 90 capsule 1   • ezetimibe (ZETIA) 10 MG tablet TAKE 1 TABLET DAILY 90 tablet 3   • metoprolol tartrate (LOPRESSOR) 50 MG tablet TAKE 1 TABLET TWICE A  tablet 3   • sildenafil (VIAGRA) 100 MG tablet TAKE 1 TABLET ONCE DAILY AS NEEDED FOR ED 20 tablet 1   • HYDROcodone-acetaminophen (NORCO) 5-325 MG per tablet Take 1 tablet by mouth Every 8 (Eight) Hours As Needed for Moderate Pain  (Pain). 6 tablet 0     No facility-administered medications prior to visit.        Patient Active Problem List   Diagnosis   • Hypertension   • GERD without esophagitis   • Hyperlipidemia   • Erectile dysfunction   • Elevated prostate specific antigen (PSA)   • BPH (benign prostatic hypertrophy) with urinary obstruction   • Cough   • Bronchitis   • Primary insomnia   • Benign prostatic hyperplasia with lower urinary tract symptoms   • Prostate cancer (CMS/HCC)   • Scabies       Advanced Care Planning:  ACP discussion was held with the patient during this visit. Patient does not have an advance directive, information provided.    Review of Systems    Compared to one year ago, the patient feels his physical health is the same.  Compared to  "one year ago, the patient feels his mental health is the same.    Reviewed chart for potential of high risk medication in the elderly: yes  Reviewed chart for potential of harmful drug interactions in the elderly:yes    Objective         Vitals:    08/18/20 0911   BP: 134/82   Pulse: 66   Resp: 16   SpO2: 97%   Weight: 87.1 kg (192 lb)   Height: 170.2 cm (67\")       Body mass index is 30.07 kg/m².  Discussed the patient's BMI with him. The BMI is above average; BMI management plan is completed.    Physical Exam    Lab Results   Component Value Date     (H) 08/12/2020        Assessment/Plan   Medicare Risks and Personalized Health Plan  CMS Preventative Services Quick Reference  Advance Directive Discussion    The above risks/problems have been discussed with the patient.  Pertinent information has been shared with the patient in the After Visit Summary.  Follow up plans and orders are seen below in the Assessment/Plan Section.    Diagnoses and all orders for this visit:    1. Essential hypertension (Primary)  -     US aorta limited    2. GERD without esophagitis    3. Prostate cancer (CMS/[facility])    4. Scabies    Other orders  -     permethrin 1 % lotion; Apply  topically to the appropriate area as directed 1 (One) Time for 1 dose. Shampoo, rinse and towel dry hair, saturate hair and scalp with permethrin.  Dispense: 59 mL; Refill: 1      Follow Up:  No follow-ups on file.     An After Visit Summary and PPPS were given to the patient.             "

## 2020-08-18 NOTE — PROGRESS NOTES
"Subjective   Nehemiah Gomez is a 74 y.o. male.     Chief Complaint   Patient presents with   • Follow-up     6 mo  htn   • Medicare Wellness-subsequent   • Rash     pt states that he has turkey mites bites on legs        History of Present Illness     he was affected by \"turkey mites\" after being in the field in [facility] last week--he notes good bp control without cp or ha--he is followed by urlogy for todd hx of prostate cancer--his gerd symptoms are stable wthout dysphagia..      Current Outpatient Medications:   •  amitriptyline (ELAVIL) 25 MG tablet, TAKE 1 TABLET EVERY NIGHT, Disp: 90 tablet, Rfl: 4  •  amLODIPine (NORVASC) 5 MG tablet, TAKE 1 TABLET DAILY, Disp: 90 tablet, Rfl: 3  •  aspirin (aspirin) 81 MG EC tablet, Take 81 mg by mouth Daily., Disp: , Rfl:   •  esomeprazole (nexIUM) 40 MG capsule, TAKE 1 CAPSULE DAILY, Disp: 90 capsule, Rfl: 1  •  ezetimibe (ZETIA) 10 MG tablet, TAKE 1 TABLET DAILY, Disp: 90 tablet, Rfl: 3  •  metoprolol tartrate (LOPRESSOR) 50 MG tablet, TAKE 1 TABLET TWICE A DAY, Disp: 180 tablet, Rfl: 3  •  permethrin 1 % lotion, Apply  topically to the appropriate area as directed 1 (One) Time for 1 dose. Shampoo, rinse and towel dry hair, saturate hair and scalp with permethrin., Disp: 59 mL, Rfl: 1  •  sildenafil (VIAGRA) 100 MG tablet, TAKE 1 TABLET ONCE DAILY AS NEEDED FOR ED, Disp: 20 tablet, Rfl: 1  No Known Allergies    Past Medical History:   Diagnosis Date   • Diverticulosis    • Erectile disorder due to 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    • GERD (gastroesophageal reflux disease)    • Hyperlipidemia    • Hypertension    • Insomnia    • Kidney stone    • Prostate cancer (CMS/HCC)      Past Surgical History:   Procedure Laterality Date   • CHOLECYSTECTOMY     • PROSTATE BIOPSY N/A 6/29/2020    Procedure: PROSTATE ULTRASOUND URONAV FUSION BIOPSY.;  Surgeon: Watson Sheehan MD;  Location: [facility];  Service: Urology;  Laterality: N/A;       Review of Systems   Constitutional: " "Negative.    HENT: Negative.    Eyes: Negative.    Respiratory: Negative.    Cardiovascular: Negative.    Gastrointestinal: Negative.    Endocrine: Negative.    Genitourinary: Positive for difficulty urinating.   Musculoskeletal: Negative.    Skin: Positive for rash.   Allergic/Immunologic: Negative.    Neurological: Negative.    Hematological: Negative.    Psychiatric/Behavioral: Negative.        Objective  /82   Pulse 66   Resp 16   Ht 170.2 cm (67\")   Wt 87.1 kg (192 lb)   SpO2 97%   BMI 30.07 kg/m²   Physical Exam   Constitutional: He is oriented to person, place, and time. He appears well-developed and well-nourished.   HENT:   Head: Normocephalic and atraumatic.   Right Ear: External ear normal.   Left Ear: External ear normal.   Nose: Nose normal.   Mouth/Throat: Oropharynx is clear and moist.   Eyes: Pupils are equal, round, and reactive to light. Conjunctivae and EOM are normal.   Neck: Normal range of motion. Neck supple.   Cardiovascular: Regular rhythm, normal heart sounds and intact distal pulses.   Pulmonary/Chest: Effort normal and breath sounds normal.   Abdominal: Soft. Bowel sounds are normal.   Musculoskeletal: Normal range of motion.   Neurological: He is alert and oriented to person, place, and time.   Skin: Skin is warm. Capillary refill takes less than 2 seconds.   Psychiatric: He has a normal mood and affect. His behavior is normal. Judgment and thought content normal.   Nursing note and vitals reviewed.      Assessment/Plan   Nehemiah was seen today for follow-up, medicare wellness-subsequent and rash.    Diagnoses and all orders for this visit:    Essential hypertension  -     US aorta limited    GERD without esophagitis    Prostate cancer (CMS/[facility])    Scabies    Other orders  -     permethrin 1 % lotion; Apply  topically to the appropriate area as directed 1 (One) Time for 1 dose. Shampoo, rinse and towel dry hair, saturate hair and scalp with permethrin.                 Orders " Placed This Encounter   Procedures   • US aorta limited     Order Specific Question:   Reason for Exam:     Answer:   screen for aaa       Follow up: 6 month(s)

## 2020-08-24 ENCOUNTER — HOSPITAL ENCOUNTER (OUTPATIENT)
Dept: ULTRASOUND IMAGING | Facility: HOSPITAL | Age: 75
Discharge: HOME | End: 2020-08-24
Admitting: FAMILY MEDICINE

## 2020-08-24 PROCEDURE — 76775 US EXAM ABDO BACK WALL LIM: CPT

## 2020-10-19 RX ORDER — ESOMEPRAZOLE MAGNESIUM 40 MG/1
CAPSULE, DELAYED RELEASE ORAL
Qty: 90 CAPSULE | Refills: 1 | Status: SHIPPED | OUTPATIENT
Start: 2020-10-19 | End: 2021-04-19

## 2020-11-02 RX ORDER — SILDENAFIL 100 MG/1
100 TABLET, FILM COATED ORAL DAILY PRN
Qty: 20 TABLET | Refills: 0 | Status: SHIPPED | OUTPATIENT
Start: 2020-11-02 | End: 2021-08-20 | Stop reason: SDUPTHER

## 2020-11-02 NOTE — TELEPHONE ENCOUNTER
Pt came by requesting a rx for brand name viagra.    Requested Prescriptions     Pending Prescriptions Disp Refills   • sildenafil (Viagra) 100 MG tablet 20 tablet 0     Sig: Take 1 tablet by mouth Daily As Needed for Erectile Dysfunction.

## 2020-11-17 RX ORDER — SILDENAFIL 100 MG/1
TABLET, FILM COATED ORAL
Qty: 20 TABLET | Refills: 0 | Status: SHIPPED | OUTPATIENT
Start: 2020-11-17 | End: 2021-03-04

## 2021-02-12 ENCOUNTER — LAB (OUTPATIENT)
Dept: FAMILY MEDICINE CLINIC | Facility: CLINIC | Age: 76
End: 2021-02-12

## 2021-02-13 LAB
BASOPHILS # BLD AUTO: 0.04 10*3/MM3 (ref 0–0.2)
BASOPHILS NFR BLD AUTO: 0.9 % (ref 0–1.5)
CHOLEST SERPL-MCNC: 184 MG/DL (ref 0–200)
EOSINOPHIL # BLD AUTO: 0.09 10*3/MM3 (ref 0–0.4)
EOSINOPHIL NFR BLD AUTO: 1.9 % (ref 0.3–6.2)
ERYTHROCYTE [DISTWIDTH] IN BLOOD BY AUTOMATED COUNT: 13 % (ref 12.3–15.4)
HCT VFR BLD AUTO: 47.8 % (ref 37.5–51)
HDLC SERPL-MCNC: 37 MG/DL (ref 40–60)
HGB BLD-MCNC: 15.8 G/DL (ref 13–17.7)
IMM GRANULOCYTES # BLD AUTO: 0.01 10*3/MM3 (ref 0–0.05)
IMM GRANULOCYTES NFR BLD AUTO: 0.2 % (ref 0–0.5)
LDLC SERPL CALC-MCNC: 123 MG/DL (ref 0–100)
LYMPHOCYTES # BLD AUTO: 1.03 10*3/MM3 (ref 0.7–3.1)
LYMPHOCYTES NFR BLD AUTO: 22.1 % (ref 19.6–45.3)
MCH RBC QN AUTO: 29.3 PG (ref 26.6–33)
MCHC RBC AUTO-ENTMCNC: 33.1 G/DL (ref 31.5–35.7)
MCV RBC AUTO: 88.7 FL (ref 79–97)
MONOCYTES # BLD AUTO: 0.46 10*3/MM3 (ref 0.1–0.9)
MONOCYTES NFR BLD AUTO: 9.9 % (ref 5–12)
NEUTROPHILS # BLD AUTO: 3.03 10*3/MM3 (ref 1.7–7)
NEUTROPHILS NFR BLD AUTO: 65 % (ref 42.7–76)
NRBC BLD AUTO-RTO: 0 /100 WBC (ref 0–0.2)
PLATELET # BLD AUTO: 217 10*3/MM3 (ref 140–450)
RBC # BLD AUTO: 5.39 10*6/MM3 (ref 4.14–5.8)
TRIGL SERPL-MCNC: 131 MG/DL (ref 0–150)
TSH SERPL DL<=0.005 MIU/L-ACNC: 1.38 UIU/ML (ref 0.27–4.2)
VLDLC SERPL CALC-MCNC: 24 MG/DL (ref 5–40)
WBC # BLD AUTO: 4.66 10*3/MM3 (ref 3.4–10.8)

## 2021-02-19 ENCOUNTER — OFFICE VISIT (OUTPATIENT)
Dept: FAMILY MEDICINE CLINIC | Facility: CLINIC | Age: 76
End: 2021-02-19

## 2021-02-19 VITALS
SYSTOLIC BLOOD PRESSURE: 126 MMHG | WEIGHT: 190 LBS | HEIGHT: 67 IN | DIASTOLIC BLOOD PRESSURE: 84 MMHG | HEART RATE: 67 BPM | RESPIRATION RATE: 16 BRPM | BODY MASS INDEX: 29.82 KG/M2 | OXYGEN SATURATION: 98 %

## 2021-02-19 DIAGNOSIS — I10 ESSENTIAL HYPERTENSION: Primary | ICD-10-CM

## 2021-02-19 DIAGNOSIS — K21.9 GERD WITHOUT ESOPHAGITIS: ICD-10-CM

## 2021-02-19 DIAGNOSIS — E78.2 MIXED HYPERLIPIDEMIA: ICD-10-CM

## 2021-02-19 DIAGNOSIS — N52.9 ERECTILE DYSFUNCTION, UNSPECIFIED ERECTILE DYSFUNCTION TYPE: ICD-10-CM

## 2021-02-19 PROCEDURE — 99213 OFFICE O/P EST LOW 20 MIN: CPT | Performed by: FAMILY MEDICINE

## 2021-02-19 NOTE — PROGRESS NOTES
Subjective   Nehemiah Gomez is a 75 y.o. male.     Chief Complaint   Patient presents with   • Follow-up     6 mo   htn       History of Present Illness     he nots good bp control without cp or ha--his gerd syhmptoms are controleld without dysphagia --using viagra for ed--has appt this year with dr samuels for his prostate cancer hx      Current Outpatient Medications:   •  amitriptyline (ELAVIL) 25 MG tablet, TAKE 1 TABLET EVERY NIGHT, Disp: 90 tablet, Rfl: 4  •  amLODIPine (NORVASC) 5 MG tablet, TAKE 1 TABLET DAILY, Disp: 90 tablet, Rfl: 3  •  aspirin (aspirin) 81 MG EC tablet, Take 81 mg by mouth Daily., Disp: , Rfl:   •  esomeprazole (nexIUM) 40 MG capsule, TAKE 1 CAPSULE DAILY, Disp: 90 capsule, Rfl: 1  •  ezetimibe (ZETIA) 10 MG tablet, TAKE 1 TABLET DAILY, Disp: 90 tablet, Rfl: 3  •  metoprolol tartrate (LOPRESSOR) 50 MG tablet, TAKE 1 TABLET TWICE A DAY, Disp: 180 tablet, Rfl: 3  •  sildenafil (VIAGRA) 100 MG tablet, TAKE 1 TABLET ONCE DAILY AS NEEDED FOR ED, Disp: 20 tablet, Rfl: 1  •  sildenafil (Viagra) 100 MG tablet, Take 1 tablet by mouth Daily As Needed for Erectile Dysfunction., Disp: 20 tablet, Rfl: 0  •  sildenafil (VIAGRA) 100 MG tablet, TAKE 1 TABLET ONCE DAILY AS NEEDED FOR ED, Disp: 20 tablet, Rfl: 0  No Known Allergies    Past Medical History:   Diagnosis Date   • Diverticulosis    • Erectile disorder due to 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    • GERD (gastroesophageal reflux disease)    • Hyperlipidemia    • Hypertension    • Insomnia    • Kidney stone    • Prostate cancer (CMS/HCC)      Past Surgical History:   Procedure Laterality Date   • CHOLECYSTECTOMY     • PROSTATE BIOPSY N/A 6/29/2020    Procedure: PROSTATE ULTRASOUND URONAV FUSION BIOPSY.;  Surgeon: Watson Samuels MD;  Location: [facility];  Service: Urology;  Laterality: N/A;       Review of Systems   Constitutional: Negative.    HENT: Negative.    Eyes: Negative.    Respiratory: Negative.    Cardiovascular: Negative.   "  Gastrointestinal: Negative.    Endocrine: Negative.    Genitourinary: Negative.    Musculoskeletal: Negative.    Skin: Negative.    Allergic/Immunologic: Negative.    Neurological: Negative.    Hematological: Negative.    Psychiatric/Behavioral: Negative.        Objective  /84   Pulse 67   Resp 16   Ht 170.2 cm (67\")   Wt 86.2 kg (190 lb)   SpO2 98%   BMI 29.76 kg/m²   Physical Exam  Vitals signs and nursing note reviewed.   Constitutional:       Appearance: Normal appearance. He is normal weight.   HENT:      Head: Normocephalic and atraumatic.      Nose: Nose normal.      Mouth/Throat:      Mouth: Mucous membranes are moist.      Pharynx: Oropharynx is clear.   Eyes:      Extraocular Movements: Extraocular movements intact.      Conjunctiva/sclera: Conjunctivae normal.      Pupils: Pupils are equal, round, and reactive to light.   Neck:      Musculoskeletal: Normal range of motion and neck supple.   Cardiovascular:      Rate and Rhythm: Normal rate and regular rhythm.      Pulses: Normal pulses.      Heart sounds: Normal heart sounds.   Pulmonary:      Effort: Pulmonary effort is normal.      Breath sounds: Normal breath sounds.   Abdominal:      General: Abdomen is flat. Bowel sounds are normal.      Palpations: Abdomen is soft.   Musculoskeletal: Normal range of motion.   Skin:     General: Skin is warm and dry.      Capillary Refill: Capillary refill takes less than 2 seconds.   Neurological:      General: No focal deficit present.      Mental Status: He is alert and oriented to person, place, and time. Mental status is at baseline.   Psychiatric:         Mood and Affect: Mood normal.         Behavior: Behavior normal.         Thought Content: Thought content normal.         Judgment: Judgment normal.         Assessment/Plan   Diagnoses and all orders for this visit:    1. Essential hypertension (Primary)    2. Mixed hyperlipidemia    3. GERD without esophagitis    4. Erectile dysfunction, " unspecified erectile dysfunction type      We discussed his labs     he will see dr samuels this year ---he monitors his bp --we discucssed covid 19i--he has received 1st vaccine series..    No orders of the defined types were placed in this encounter.      Follow upmonth(s)

## 2021-02-22 RX ORDER — EZETIMIBE 10 MG/1
TABLET ORAL
Qty: 90 TABLET | Refills: 3 | Status: SHIPPED | OUTPATIENT
Start: 2021-02-22 | End: 2022-02-22

## 2021-02-22 RX ORDER — METOPROLOL TARTRATE 50 MG/1
TABLET, FILM COATED ORAL
Qty: 180 TABLET | Refills: 3 | Status: SHIPPED | OUTPATIENT
Start: 2021-02-22 | End: 2022-02-22

## 2021-02-22 RX ORDER — AMLODIPINE BESYLATE 5 MG/1
TABLET ORAL
Qty: 90 TABLET | Refills: 3 | Status: SHIPPED | OUTPATIENT
Start: 2021-02-22 | End: 2022-02-22

## 2021-02-22 NOTE — TELEPHONE ENCOUNTER
Requested Prescriptions     Pending Prescriptions Disp Refills   • ezetimibe (ZETIA) 10 MG tablet [Pharmacy Med Name: EZETIMIBE TABS 10MG] 90 tablet 3     Sig: TAKE 1 TABLET DAILY   • amLODIPine (NORVASC) 5 MG tablet [Pharmacy Med Name: AMLODIPINE BESYLATE TABS 5MG] 90 tablet 3     Sig: TAKE 1 TABLET DAILY   • metoprolol tartrate (LOPRESSOR) 50 MG tablet [Pharmacy Med Name: METOPROLOL TARTRATE TABS 50MG] 180 tablet 3     Sig: TAKE 1 TABLET TWICE A DAY

## 2021-03-04 RX ORDER — SILDENAFIL 100 MG/1
TABLET, FILM COATED ORAL
Qty: 20 TABLET | Refills: 0 | Status: SHIPPED | OUTPATIENT
Start: 2021-03-04 | End: 2021-06-08

## 2021-03-17 RX ORDER — AMITRIPTYLINE HYDROCHLORIDE 25 MG/1
TABLET, FILM COATED ORAL
Qty: 90 TABLET | Refills: 3 | Status: SHIPPED | OUTPATIENT
Start: 2021-03-17 | End: 2022-02-22

## 2021-03-17 NOTE — TELEPHONE ENCOUNTER
Requested Prescriptions     Pending Prescriptions Disp Refills   • amitriptyline (ELAVIL) 25 MG tablet [Pharmacy Med Name: AMITRIPTYLINE HCL TABS 25MG] 90 tablet 3     Sig: TAKE 1 TABLET EVERY NIGHT        No

## 2021-04-19 RX ORDER — ESOMEPRAZOLE MAGNESIUM 40 MG/1
CAPSULE, DELAYED RELEASE ORAL
Qty: 90 CAPSULE | Refills: 3 | Status: SHIPPED | OUTPATIENT
Start: 2021-04-19 | End: 2022-04-11

## 2021-04-19 NOTE — TELEPHONE ENCOUNTER
Requested Prescriptions     Pending Prescriptions Disp Refills   • esomeprazole (nexIUM) 40 MG capsule [Pharmacy Med Name: ESOMEPRAZOLE MAGNESIUM DR CAPS 40MG] 90 capsule 3     Sig: TAKE 1 CAPSULE DAILY

## 2021-05-05 DIAGNOSIS — C61 PROSTATE CANCER (HCC): Primary | ICD-10-CM

## 2021-05-05 DIAGNOSIS — C61 PROSTATE CANCER (HCC): ICD-10-CM

## 2021-05-07 LAB — PSA SERPL-MCNC: 4.67 NG/ML (ref 0–4)

## 2021-05-13 ENCOUNTER — OFFICE VISIT (OUTPATIENT)
Dept: UROLOGY | Facility: CLINIC | Age: 76
End: 2021-05-13

## 2021-05-13 VITALS — HEIGHT: 67 IN | TEMPERATURE: 97.4 F | WEIGHT: 194.8 LBS | BODY MASS INDEX: 30.57 KG/M2

## 2021-05-13 DIAGNOSIS — C61 PROSTATE CANCER (HCC): Primary | ICD-10-CM

## 2021-05-13 DIAGNOSIS — N52.9 ERECTILE DYSFUNCTION, UNSPECIFIED ERECTILE DYSFUNCTION TYPE: ICD-10-CM

## 2021-05-13 PROCEDURE — 81003 URINALYSIS AUTO W/O SCOPE: CPT | Performed by: UROLOGY

## 2021-05-13 PROCEDURE — 99214 OFFICE O/P EST MOD 30 MIN: CPT | Performed by: UROLOGY

## 2021-05-19 ENCOUNTER — OFFICE VISIT (OUTPATIENT)
Dept: FAMILY MEDICINE CLINIC | Facility: CLINIC | Age: 76
End: 2021-05-19

## 2021-05-19 VITALS
HEIGHT: 67 IN | BODY MASS INDEX: 30.13 KG/M2 | DIASTOLIC BLOOD PRESSURE: 78 MMHG | HEART RATE: 78 BPM | RESPIRATION RATE: 16 BRPM | TEMPERATURE: 98 F | WEIGHT: 192 LBS | SYSTOLIC BLOOD PRESSURE: 132 MMHG

## 2021-05-19 DIAGNOSIS — J30.2 SEASONAL ALLERGIES: ICD-10-CM

## 2021-05-19 DIAGNOSIS — J02.9 PHARYNGITIS, UNSPECIFIED ETIOLOGY: Primary | ICD-10-CM

## 2021-05-19 PROCEDURE — 99213 OFFICE O/P EST LOW 20 MIN: CPT | Performed by: NURSE PRACTITIONER

## 2021-05-19 RX ORDER — LEVOCETIRIZINE DIHYDROCHLORIDE 5 MG/1
5 TABLET, FILM COATED ORAL EVERY EVENING
Qty: 30 TABLET | Refills: 0 | Status: SHIPPED | OUTPATIENT
Start: 2021-05-19 | End: 2022-05-17

## 2021-05-19 RX ORDER — AZITHROMYCIN 250 MG/1
TABLET, FILM COATED ORAL
Qty: 6 TABLET | Refills: 0 | Status: SHIPPED | OUTPATIENT
Start: 2021-05-19 | End: 2021-05-24

## 2021-05-19 NOTE — PROGRESS NOTES
Subjective   Chief Complaint:  Sore throat    History of Present Illness:  This 75 y.o. male was seen in the office today.  Ports sore throat since Monday.  Denies cough or fever.      No Known Allergies   Current Outpatient Medications on File Prior to Visit   Medication Sig   • amitriptyline (ELAVIL) 25 MG tablet TAKE 1 TABLET EVERY NIGHT   • amLODIPine (NORVASC) 5 MG tablet TAKE 1 TABLET DAILY   • aspirin (aspirin) 81 MG EC tablet Take 81 mg by mouth Daily.   • esomeprazole (nexIUM) 40 MG capsule TAKE 1 CAPSULE DAILY   • ezetimibe (ZETIA) 10 MG tablet TAKE 1 TABLET DAILY   • metoprolol tartrate (LOPRESSOR) 50 MG tablet TAKE 1 TABLET TWICE A DAY   • sildenafil (VIAGRA) 100 MG tablet TAKE 1 TABLET ONCE DAILY AS NEEDED FOR ED   • sildenafil (Viagra) 100 MG tablet Take 1 tablet by mouth Daily As Needed for Erectile Dysfunction.   • sildenafil (VIAGRA) 100 MG tablet TAKE 1 TABLET ONCE DAILY AS NEEDED FOR ERECTILE DYSFUNCTION.     No current facility-administered medications on file prior to visit.      Past Medical, Surgical, Social, and Family History:  Past Medical History:   Diagnosis Date   • Diverticulosis    • Erectile disorder due to 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    • GERD (gastroesophageal reflux disease)    • Hyperlipidemia    • Hypertension    • Insomnia    • Kidney stone    • Prostate cancer (CMS/HCC)      Past Surgical History:   Procedure Laterality Date   • CHOLECYSTECTOMY     • PROSTATE BIOPSY N/A 2020    Procedure: PROSTATE ULTRASOUND URONAV FUSION BIOPSY.;  Surgeon: Watson Sheehan MD;  Location: [facility];  Service: Urology;  Laterality: N/A;     Social History     Socioeconomic History   • Marital status:      Spouse name: Not on file   • Number of children: Not on file   • Years of education: Not on file   • Highest education level: Not on file   Tobacco Use   • Smoking status: Former Smoker     Years: 20.00     Types: Cigarettes     Quit date:      Years since quittin.4  "  • Smokeless tobacco: Never Used   Vaping Use   • Vaping Use: Never used   Substance and Sexual Activity   • Alcohol use: Not Currently   • Drug use: No   • Sexual activity: Defer     Family History   Problem Relation Age of Onset   • No Known Problems Father    • No Known Problems Mother      Objective   Physical Exam  Constitutional:       General: He is not in acute distress.  HENT:      Mouth/Throat:      Comments: Erythematous throat, nasal congestion, thick clear exudate on back of throat coating  Cardiovascular:      Rate and Rhythm: Normal rate and regular rhythm.   Pulmonary:      Effort: Pulmonary effort is normal.      Breath sounds: Normal breath sounds.   Neurological:      Mental Status: He is alert.     /78   Pulse 78   Temp 98 °F (36.7 °C)   Resp 16   Ht 170.2 cm (67.01\")   Wt 87.1 kg (192 lb)   BMI 30.06 kg/m²     Assessment/Plan   Diagnoses and all orders for this visit:    1. Pharyngitis, unspecified etiology (Primary)  -     azithromycin (Zithromax Z-Jamie) 250 MG tablet; Take 2 tablets the first day, then 1 tablet daily for 4 days.  Dispense: 6 tablet; Refill: 0    2. Seasonal allergies  -     levocetirizine (Xyzal) 5 MG tablet; Take 1 tablet by mouth Every Evening.  Dispense: 30 tablet; Refill: 0    Discussion:  Advised and educated plan of care.  Believe there is an allergic component to this.  We will treat with Z-Jamie with antihistamines.    Follow-up:  Return if symptoms worsen or fail to improve.    Electronically signed by NICANOR Alva, 05/19/21, 2:02 PM CDT.  "

## 2021-06-08 RX ORDER — SILDENAFIL 100 MG/1
TABLET, FILM COATED ORAL
Qty: 20 TABLET | Refills: 0 | Status: SHIPPED | OUTPATIENT
Start: 2021-06-08 | End: 2022-02-25

## 2021-06-08 NOTE — TELEPHONE ENCOUNTER
Requested Prescriptions     Pending Prescriptions Disp Refills   • sildenafil (VIAGRA) 100 MG tablet [Pharmacy Med Name: SILDENAFIL 100 MG TABLET] 20 tablet 0     Sig: TAKE 1 TABLET ONCE DAILY AS NEEDED FOR ERECTILE DYSFUNCTION.

## 2021-08-18 ENCOUNTER — LAB (OUTPATIENT)
Dept: FAMILY MEDICINE CLINIC | Facility: CLINIC | Age: 76
End: 2021-08-18

## 2021-08-18 DIAGNOSIS — E78.2 MIXED HYPERLIPIDEMIA: ICD-10-CM

## 2021-08-18 DIAGNOSIS — I10 ESSENTIAL HYPERTENSION: Primary | ICD-10-CM

## 2021-08-19 LAB
ALBUMIN SERPL-MCNC: 4.1 G/DL (ref 3.5–5.2)
ALBUMIN/GLOB SERPL: 1.5 G/DL
ALP SERPL-CCNC: 117 U/L (ref 39–117)
ALT SERPL-CCNC: 20 U/L (ref 1–41)
AST SERPL-CCNC: 27 U/L (ref 1–40)
BASOPHILS # BLD AUTO: 0.02 10*3/MM3 (ref 0–0.2)
BASOPHILS NFR BLD AUTO: 0.5 % (ref 0–1.5)
BILIRUB SERPL-MCNC: 0.6 MG/DL (ref 0–1.2)
BUN SERPL-MCNC: 17 MG/DL (ref 8–23)
BUN/CREAT SERPL: 18.3 (ref 7–25)
CALCIUM SERPL-MCNC: 9.3 MG/DL (ref 8.6–10.5)
CHLORIDE SERPL-SCNC: 101 MMOL/L (ref 98–107)
CHOLEST SERPL-MCNC: 161 MG/DL (ref 0–200)
CO2 SERPL-SCNC: 25.1 MMOL/L (ref 22–29)
CREAT SERPL-MCNC: 0.93 MG/DL (ref 0.76–1.27)
EOSINOPHIL # BLD AUTO: 0.14 10*3/MM3 (ref 0–0.4)
EOSINOPHIL NFR BLD AUTO: 3.4 % (ref 0.3–6.2)
ERYTHROCYTE [DISTWIDTH] IN BLOOD BY AUTOMATED COUNT: 12.8 % (ref 12.3–15.4)
GLOBULIN SER CALC-MCNC: 2.7 GM/DL
GLUCOSE SERPL-MCNC: 113 MG/DL (ref 65–99)
HCT VFR BLD AUTO: 48.8 % (ref 37.5–51)
HDLC SERPL-MCNC: 27 MG/DL (ref 40–60)
HGB BLD-MCNC: 16.1 G/DL (ref 13–17.7)
IMM GRANULOCYTES # BLD AUTO: 0.01 10*3/MM3 (ref 0–0.05)
IMM GRANULOCYTES NFR BLD AUTO: 0.2 % (ref 0–0.5)
LDLC SERPL CALC-MCNC: 108 MG/DL (ref 0–100)
LYMPHOCYTES # BLD AUTO: 1.14 10*3/MM3 (ref 0.7–3.1)
LYMPHOCYTES NFR BLD AUTO: 28 % (ref 19.6–45.3)
MCH RBC QN AUTO: 30 PG (ref 26.6–33)
MCHC RBC AUTO-ENTMCNC: 33 G/DL (ref 31.5–35.7)
MCV RBC AUTO: 90.9 FL (ref 79–97)
MONOCYTES # BLD AUTO: 0.57 10*3/MM3 (ref 0.1–0.9)
MONOCYTES NFR BLD AUTO: 14 % (ref 5–12)
NEUTROPHILS # BLD AUTO: 2.19 10*3/MM3 (ref 1.7–7)
NEUTROPHILS NFR BLD AUTO: 53.9 % (ref 42.7–76)
NRBC BLD AUTO-RTO: 0 /100 WBC (ref 0–0.2)
PLATELET # BLD AUTO: 177 10*3/MM3 (ref 140–450)
POTASSIUM SERPL-SCNC: 3.9 MMOL/L (ref 3.5–5.2)
PROT SERPL-MCNC: 6.8 G/DL (ref 6–8.5)
RBC # BLD AUTO: 5.37 10*6/MM3 (ref 4.14–5.8)
SODIUM SERPL-SCNC: 136 MMOL/L (ref 136–145)
TRIGL SERPL-MCNC: 146 MG/DL (ref 0–150)
TSH SERPL DL<=0.005 MIU/L-ACNC: 0.84 UIU/ML (ref 0.27–4.2)
VLDLC SERPL CALC-MCNC: 26 MG/DL (ref 5–40)
WBC # BLD AUTO: 4.07 10*3/MM3 (ref 3.4–10.8)

## 2021-08-20 ENCOUNTER — OFFICE VISIT (OUTPATIENT)
Dept: FAMILY MEDICINE CLINIC | Facility: CLINIC | Age: 76
End: 2021-08-20

## 2021-08-20 ENCOUNTER — TELEPHONE (OUTPATIENT)
Dept: FAMILY MEDICINE CLINIC | Facility: CLINIC | Age: 76
End: 2021-08-20

## 2021-08-20 VITALS
BODY MASS INDEX: 29.51 KG/M2 | DIASTOLIC BLOOD PRESSURE: 72 MMHG | HEIGHT: 67 IN | TEMPERATURE: 97.9 F | WEIGHT: 188 LBS | SYSTOLIC BLOOD PRESSURE: 116 MMHG | OXYGEN SATURATION: 93 % | HEART RATE: 62 BPM | RESPIRATION RATE: 16 BRPM

## 2021-08-20 DIAGNOSIS — E78.2 MIXED HYPERLIPIDEMIA: ICD-10-CM

## 2021-08-20 DIAGNOSIS — K21.9 GERD WITHOUT ESOPHAGITIS: ICD-10-CM

## 2021-08-20 DIAGNOSIS — J06.9 UPPER RESPIRATORY TRACT INFECTION, UNSPECIFIED TYPE: ICD-10-CM

## 2021-08-20 DIAGNOSIS — I10 ESSENTIAL HYPERTENSION: Primary | ICD-10-CM

## 2021-08-20 PROCEDURE — G0439 PPPS, SUBSEQ VISIT: HCPCS | Performed by: FAMILY MEDICINE

## 2021-08-20 PROCEDURE — 99213 OFFICE O/P EST LOW 20 MIN: CPT | Performed by: FAMILY MEDICINE

## 2021-08-20 RX ORDER — METHYLPREDNISOLONE 4 MG/1
TABLET ORAL
Qty: 21 TABLET | Refills: 0 | Status: SHIPPED | OUTPATIENT
Start: 2021-08-20 | End: 2022-02-25

## 2021-08-20 RX ORDER — ALBUTEROL SULFATE 90 UG/1
2 AEROSOL, METERED RESPIRATORY (INHALATION) EVERY 4 HOURS PRN
Qty: 6.7 G | Refills: 0 | Status: SHIPPED | OUTPATIENT
Start: 2021-08-20 | End: 2022-12-13

## 2021-08-20 NOTE — PROGRESS NOTES
Subjective   Nehemiah Gomez is a 75 y.o. male.     Chief Complaint   Patient presents with   • Medicare Wellness-subsequent     6mo f/u htn        History of Present Illness     he notes bp is stable iwthout cp or ha---toleratring zetia for myalgia s--his gerd symtpoms stable witout dysphagia  hes had uri symptoms woud like covid test    Current Outpatient Medications:   •  amitriptyline (ELAVIL) 25 MG tablet, TAKE 1 TABLET EVERY NIGHT, Disp: 90 tablet, Rfl: 3  •  amLODIPine (NORVASC) 5 MG tablet, TAKE 1 TABLET DAILY, Disp: 90 tablet, Rfl: 3  •  aspirin (aspirin) 81 MG EC tablet, Take 81 mg by mouth Daily., Disp: , Rfl:   •  esomeprazole (nexIUM) 40 MG capsule, TAKE 1 CAPSULE DAILY, Disp: 90 capsule, Rfl: 3  •  ezetimibe (ZETIA) 10 MG tablet, TAKE 1 TABLET DAILY, Disp: 90 tablet, Rfl: 3  •  levocetirizine (Xyzal) 5 MG tablet, Take 1 tablet by mouth Every Evening., Disp: 30 tablet, Rfl: 0  •  metoprolol tartrate (LOPRESSOR) 50 MG tablet, TAKE 1 TABLET TWICE A DAY, Disp: 180 tablet, Rfl: 3  •  sildenafil (VIAGRA) 100 MG tablet, TAKE 1 TABLET ONCE DAILY AS NEEDED FOR ED, Disp: 20 tablet, Rfl: 1  •  sildenafil (VIAGRA) 100 MG tablet, TAKE 1 TABLET ONCE DAILY AS NEEDED FOR ERECTILE DYSFUNCTION., Disp: 20 tablet, Rfl: 0  •  albuterol sulfate  (90 Base) MCG/ACT inhaler, Inhale 2 puffs Every 4 (Four) Hours As Needed for Wheezing., Disp: 6.7 g, Rfl: 0  •  methylPREDNISolone (MEDROL) 4 MG dose pack, Take as directed on package instructions., Disp: 21 tablet, Rfl: 0  No Known Allergies    Past Medical History:   Diagnosis Date   • Diverticulosis    • Erectile disorder due to 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    • GERD (gastroesophageal reflux disease)    • Hyperlipidemia    • Hypertension    • Insomnia    • Kidney stone    • Prostate cancer (CMS/HCC)      Past Surgical History:   Procedure Laterality Date   • CHOLECYSTECTOMY     • PROSTATE BIOPSY N/A 6/29/2020    Procedure: PROSTATE ULTRASOUND URONAV FUSION BIOPSY.;   "Surgeon: Watson Sheehan MD;  [facility];  Service: Urology;  Laterality: N/A;       Review of Systems   Constitutional: Negative.    HENT: Negative.    Eyes: Negative.    Respiratory: Negative.    Cardiovascular: Negative.    Gastrointestinal: Negative.    Endocrine: Negative.    Genitourinary: Negative.    Musculoskeletal: Negative.    Skin: Negative.    Allergic/Immunologic: Negative.    Neurological: Negative.    Hematological: Negative.    Psychiatric/Behavioral: Negative.        Objective  /72   Pulse 62   Temp 97.9 °F (36.6 °C) (Infrared)   Resp 16   Ht 170.2 cm (67\")   Wt 85.3 kg (188 lb)   SpO2 93%   BMI 29.44 kg/m²   Physical Exam  Vitals and nursing note reviewed.   Constitutional:       Appearance: Normal appearance. He is normal weight.   HENT:      Head: Normocephalic and atraumatic.      Right Ear: Tympanic membrane normal.      Nose: Nose normal.      Mouth/Throat:      Mouth: Mucous membranes are moist.   Eyes:      Pupils: Pupils are equal, round, and reactive to light.   Cardiovascular:      Rate and Rhythm: Normal rate and regular rhythm.      Pulses: Normal pulses.      Heart sounds: Normal heart sounds.   Pulmonary:      Effort: Pulmonary effort is normal.      Breath sounds: Normal breath sounds.   Abdominal:      General: Abdomen is flat. Bowel sounds are normal.      Palpations: Abdomen is soft.   Musculoskeletal:         General: Normal range of motion.      Cervical back: Normal range of motion and neck supple.   Skin:     General: Skin is warm and dry.      Capillary Refill: Capillary refill takes less than 2 seconds.   Neurological:      General: No focal deficit present.      Mental Status: He is alert and oriented to person, place, and time. Mental status is at baseline.   Psychiatric:         Mood and Affect: Mood normal.         Behavior: Behavior normal.         Thought Content: Thought content normal.         Judgment: Judgment normal. "         Assessment/Plan   Diagnoses and all orders for this visit:    1. Essential hypertension (Primary)    2. Mixed hyperlipidemia    3. GERD without esophagitis    4. Upper respiratory tract infection, unspecified type  -     COVID-19,[facility],NP/OP Swab in Transport Media or ESwab 72 HR TAT - Swab, Nasopharynx; Future    Other orders  -     methylPREDNISolone (MEDROL) 4 MG dose pack; Take as directed on package instructions.  Dispense: 21 tablet; Refill: 0  -     albuterol sulfate  (90 Base) MCG/ACT inhaler; Inhale 2 puffs Every 4 (Four) Hours As Needed for Wheezing.  Dispense: 6.7 g; Refill: 0      He will monitor bp and keep me informd           Orders Placed This Encounter   Procedures   • COVID-19,[facility],NP/OP Swab in Transport Media or ESwab 72 HR TAT - Swab, Nasopharynx     Rapid Test     Standing Status:   Future     Standing Expiration Date:   8/20/2022     Order Specific Question:   Order Urgency:     Answer:   Routine     Order Specific Question:   Previously tested for COVID-19?     Answer:   Yes     Order Specific Question:   Employed in healthcare setting?     Answer:   No     Order Specific Question:   Symptomatic for COVID-19 as defined by CDC?     Answer:   Yes     Order Specific Question:   Date of Symptom Onset     Answer:   8/17/2021     Order Specific Question:   Hospitalized for COVID-19?     Answer:   No     Order Specific Question:   Admitted to ICU for COVID-19?     Answer:   No     Order Specific Question:   Resident in a congregate (group) care setting?     Answer:   No     Order Specific Question:   Release to patient     Answer:   Immediate       Follow up: 6 month(s)

## 2021-08-20 NOTE — PROGRESS NOTES
The ABCs of the Annual Wellness Visit  Subsequent Medicare Wellness Visit    Chief Complaint   Patient presents with   • Medicare Wellness-subsequent     6mo f/u htn       Subjective   History of Present Illness:  Nehemiah Gomez is a 75 y.o. male who presents for a Subsequent Medicare Wellness Visit.    HEALTH RISK ASSESSMENT    Recent Hospitalizations:  No hospitalization(s) within the last year.    Current Medical Providers:  Patient Care Team:  Erwin Ruelas MD as PCP - General  Davion Moreno MD as Consulting Physician (Urology)  Watson Sheehan MD as Consulting Physician (Urology)    Smoking Status:  Social History     Tobacco Use   Smoking Status Former Smoker   • Years: .   • Types: Cigarettes   • Quit date:    • Years since quittin.6   Smokeless Tobacco Never Used       Alcohol Consumption:  Social History     Substance and Sexual Activity   Alcohol Use Not Currently       Depression Screen:   PHQ-2/PHQ-9 Depression Screening 2021   Little interest or pleasure in doing things 0   Feeling down, depressed, or hopeless 0   Trouble falling or staying asleep, or sleeping too much 0   Feeling tired or having little energy 0   Poor appetite or overeating 0   Feeling bad about yourself - or that you are a failure or have let yourself or your family down 0   Trouble concentrating on things, such as reading the newspaper or watching television 0   Moving or speaking so slowly that other people could have noticed. Or the opposite - being so fidgety or restless that you have been moving around a lot more than usual 0   Thoughts that you would be better off dead, or of hurting yourself in some way 0   Total Score 0   If you checked off any problems, how difficult have these problems made it for you to do your work, take care of things at home, or get along with other people? Not difficult at all       Fall Risk Screen:  YANETHADI Fall Risk Assessment was completed, and patient is at LOW  risk for falls.Assessment completed on:8/20/2021    Health Habits and Functional and Cognitive Screening:  Functional & Cognitive Status 8/20/2021   Do you have difficulty preparing food and eating? No   Do you have difficulty bathing yourself, getting dressed or grooming yourself? No   Do you have difficulty using the toilet? No   Do you have difficulty moving around from place to place? No   Do you have trouble with steps or getting out of a bed or a chair? No   Current Diet Well Balanced Diet   Dental Exam Up to date   Eye Exam Up to date   Exercise (times per week) 3 times per week   Current Exercises Include Walking   Current Exercise Activities Include -   Do you need help using the phone?  No   Are you deaf or do you have serious difficulty hearing?  No   Do you need help with transportation? No   Do you need help shopping? No   Do you need help preparing meals?  No   Do you need help with housework?  No   Do you need help with laundry? No   Do you need help taking your medications? No   Do you need help managing money? No   Do you ever drive or ride in a car without wearing a seat belt? No   Have you felt unusual stress, anger or loneliness in the last month? No   Who do you live with? Spouse   If you need help, do you have trouble finding someone available to you? No   Have you been bothered in the last four weeks by sexual problems? No   Do you have difficulty concentrating, remembering or making decisions? No         Does the patient have evidence of cognitive impairment? No    Asprin use counseling:Does not need ASA (and currently is not on it)    Age-appropriate Screening Schedule:  Refer to the list below for future screening recommendations based on patient's age, sex and/or medical conditions. Orders for these recommended tests are listed in the plan section. The patient has been provided with a written plan.    Health Maintenance   Topic Date Due   • TDAP/TD VACCINES (1 - Tdap) Never done   • ZOSTER  VACCINE (2 of 2) 11/26/2016   • INFLUENZA VACCINE  10/01/2021   • LIPID PANEL  08/18/2022          The following portions of the patient's history were reviewed and updated as appropriate: allergies, current medications, past family history, past medical history, past social history, past surgical history and problem list.    Outpatient Medications Prior to Visit   Medication Sig Dispense Refill   • amitriptyline (ELAVIL) 25 MG tablet TAKE 1 TABLET EVERY NIGHT 90 tablet 3   • amLODIPine (NORVASC) 5 MG tablet TAKE 1 TABLET DAILY 90 tablet 3   • aspirin (aspirin) 81 MG EC tablet Take 81 mg by mouth Daily.     • esomeprazole (nexIUM) 40 MG capsule TAKE 1 CAPSULE DAILY 90 capsule 3   • ezetimibe (ZETIA) 10 MG tablet TAKE 1 TABLET DAILY 90 tablet 3   • levocetirizine (Xyzal) 5 MG tablet Take 1 tablet by mouth Every Evening. 30 tablet 0   • metoprolol tartrate (LOPRESSOR) 50 MG tablet TAKE 1 TABLET TWICE A  tablet 3   • sildenafil (VIAGRA) 100 MG tablet TAKE 1 TABLET ONCE DAILY AS NEEDED FOR ED 20 tablet 1   • sildenafil (VIAGRA) 100 MG tablet TAKE 1 TABLET ONCE DAILY AS NEEDED FOR ERECTILE DYSFUNCTION. 20 tablet 0   • sildenafil (Viagra) 100 MG tablet Take 1 tablet by mouth Daily As Needed for Erectile Dysfunction. 20 tablet 0     No facility-administered medications prior to visit.       Patient Active Problem List   Diagnosis   • Hypertension   • GERD without esophagitis   • Hyperlipidemia   • Erectile dysfunction   • Elevated prostate specific antigen (PSA)   • BPH (benign prostatic hypertrophy) with urinary obstruction   • Cough   • Bronchitis   • Primary insomnia   • Benign prostatic hyperplasia with lower urinary tract symptoms   • Prostate cancer (CMS/HCC)   • Scabies       Advanced Care Planning:  ACP discussion was held with the patient during this visit. Patient does not have an advance directive, information provided.    Review of Systems    Compared to one year ago, the patient feels his physical  "health is the same.  Compared to one year ago, the patient feels his mental health is the same.    Reviewed chart for potential of high risk medication in the elderly: yes  Reviewed chart for potential of harmful drug interactions in the elderly:yes    Objective         Vitals:    08/20/21 0954   BP: 116/72   Pulse: 62   Resp: 16   Temp: 97.9 °F (36.6 °C)   TempSrc: Infrared   SpO2: 93%   Weight: 85.3 kg (188 lb)   Height: 170.2 cm (67\")   PainSc: 0-No pain       Body mass index is 29.44 kg/m².  Discussed the patient's BMI with him. The BMI is above average; BMI management plan is completed.    Physical Exam    Lab Results   Component Value Date     (H) 08/18/2021    CHLPL 161 08/18/2021    TRIG 146 08/18/2021    HDL 27 (L) 08/18/2021     (H) 08/18/2021    VLDL 26 08/18/2021        Assessment/Plan   Medicare Risks and Personalized Health Plan  CMS Preventative Services Quick Reference  Advance Directive Discussion    The above risks/problems have been discussed with the patient.  Pertinent information has been shared with the patient in the After Visit Summary.  Follow up plans and orders are seen below in the Assessment/Plan Section.    Diagnoses and all orders for this visit:    1. Essential hypertension (Primary)    2. Mixed hyperlipidemia    3. GERD without esophagitis    4. Upper respiratory tract infection, unspecified type  -     COVID-19,[facility],NP/OP Swab in Transport Media or ESwab 72 HR TAT - Swab, Nasopharynx; Future    Other orders  -     methylPREDNISolone (MEDROL) 4 MG dose pack; Take as directed on package instructions.  Dispense: 21 tablet; Refill: 0  -     albuterol sulfate  (90 Base) MCG/ACT inhaler; Inhale 2 puffs Every 4 (Four) Hours As Needed for Wheezing.  Dispense: 6.7 g; Refill: 0      Follow Up:  No follow-ups on file.     An After Visit Summary and PPPS were given to the patient.               "

## 2021-09-01 RX ORDER — SILDENAFIL 100 MG/1
TABLET, FILM COATED ORAL
Qty: 20 TABLET | Refills: 0 | Status: SHIPPED | OUTPATIENT
Start: 2021-09-01 | End: 2021-11-15

## 2021-11-15 DIAGNOSIS — C61 PROSTATE CANCER (HCC): Primary | ICD-10-CM

## 2021-11-15 DIAGNOSIS — C61 PROSTATE CANCER (HCC): ICD-10-CM

## 2021-11-15 LAB — PSA SERPL-MCNC: 6.45 NG/ML (ref 0–4)

## 2021-11-15 RX ORDER — SILDENAFIL 100 MG/1
TABLET, FILM COATED ORAL
Qty: 20 TABLET | Refills: 0 | Status: SHIPPED | OUTPATIENT
Start: 2021-11-15 | End: 2022-02-17

## 2021-11-27 ENCOUNTER — HOSPITAL ENCOUNTER (OUTPATIENT)
Dept: MRI IMAGING | Facility: HOSPITAL | Age: 76
Discharge: HOME | End: 2021-11-27
Admitting: UROLOGY

## 2021-11-27 DIAGNOSIS — C61 PROSTATE CANCER (HCC): ICD-10-CM

## 2021-11-27 LAB — CREAT BLDA-MCNC: 1.1 MG/DL (ref 0.6–1.3)

## 2021-11-27 PROCEDURE — 72197 MRI PELVIS W/O & W/DYE: CPT

## 2021-11-27 PROCEDURE — 0 GADOBENATE DIMEGLUMINE 529 MG/ML SOLUTION: Performed by: UROLOGY

## 2021-11-27 PROCEDURE — 82565 ASSAY OF CREATININE: CPT

## 2021-11-27 PROCEDURE — A9577 INJ MULTIHANCE: HCPCS | Performed by: UROLOGY

## 2021-11-27 RX ADMIN — GADOBENATE DIMEGLUMINE 20 ML: 529 INJECTION, SOLUTION INTRAVENOUS at 10:00

## 2021-12-07 ENCOUNTER — OFFICE VISIT (OUTPATIENT)
Dept: UROLOGY | Facility: CLINIC | Age: 76
End: 2021-12-07

## 2021-12-07 VITALS — WEIGHT: 198.4 LBS | HEIGHT: 67 IN | BODY MASS INDEX: 31.14 KG/M2 | TEMPERATURE: 98.6 F

## 2021-12-07 DIAGNOSIS — C61 PROSTATE CANCER (HCC): Primary | ICD-10-CM

## 2021-12-07 DIAGNOSIS — N52.9 ERECTILE DYSFUNCTION, UNSPECIFIED ERECTILE DYSFUNCTION TYPE: ICD-10-CM

## 2021-12-07 PROCEDURE — 81001 URINALYSIS AUTO W/SCOPE: CPT | Performed by: UROLOGY

## 2021-12-07 PROCEDURE — 99214 OFFICE O/P EST MOD 30 MIN: CPT | Performed by: UROLOGY

## 2021-12-07 NOTE — PROGRESS NOTES
Chief Complaint  Follow-up prostate cancer    Subjective          Nehemiah Gomez presents to [facility] for active surveillance for prostate cancer.  He was diagnosed with UroNav fusion biopsy in June 2020.  Op note and pathology report per following link. Op Note by Watson Sheehan MD (06/29/2020 09:21)  Tissue Pathology Exam (06/29/2020 07:01) says he is handling active surveillance well.Patient denies any possible systemic symptoms of prostate cancer such as weight loss, lower extremity edema, or skeletal pain that could be worrisome for systemic disease.    Also has erectile dysfunction.  He takes sildenafil for this with good success.  Has been taken it for few years now.        Current Outpatient Medications:   •  albuterol sulfate  (90 Base) MCG/ACT inhaler, Inhale 2 puffs Every 4 (Four) Hours As Needed for Wheezing., Disp: 6.7 g, Rfl: 0  •  amitriptyline (ELAVIL) 25 MG tablet, TAKE 1 TABLET EVERY NIGHT, Disp: 90 tablet, Rfl: 3  •  amLODIPine (NORVASC) 5 MG tablet, TAKE 1 TABLET DAILY, Disp: 90 tablet, Rfl: 3  •  aspirin (aspirin) 81 MG EC tablet, Take 81 mg by mouth Daily., Disp: , Rfl:   •  esomeprazole (nexIUM) 40 MG capsule, TAKE 1 CAPSULE DAILY, Disp: 90 capsule, Rfl: 3  •  ezetimibe (ZETIA) 10 MG tablet, TAKE 1 TABLET DAILY, Disp: 90 tablet, Rfl: 3  •  levocetirizine (Xyzal) 5 MG tablet, Take 1 tablet by mouth Every Evening., Disp: 30 tablet, Rfl: 0  •  methylPREDNISolone (MEDROL) 4 MG dose pack, Take as directed on package instructions., Disp: 21 tablet, Rfl: 0  •  metoprolol tartrate (LOPRESSOR) 50 MG tablet, TAKE 1 TABLET TWICE A DAY, Disp: 180 tablet, Rfl: 3  •  sildenafil (VIAGRA) 100 MG tablet, TAKE 1 TABLET ONCE DAILY AS NEEDED FOR ED, Disp: 20 tablet, Rfl: 1  •  sildenafil (VIAGRA) 100 MG tablet, TAKE 1 TABLET ONCE DAILY AS NEEDED FOR ERECTILE DYSFUNCTION., Disp: 20 tablet, Rfl: 0  •  sildenafil (VIAGRA) 100 MG tablet, TAKE 1 TABLET ONCE DAILY AS NEEDED FOR  "ERECTILE DYSFUNCTION., Disp: 20 tablet, Rfl: 0  Past Medical History:   Diagnosis Date   • Diverticulosis    • Erectile disorder due to 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    • GERD (gastroesophageal reflux disease)    • Hyperlipidemia    • Hypertension    • Insomnia    • Kidney stone    • Prostate cancer (HCC)      Past Surgical History:   Procedure Laterality Date   • CHOLECYSTECTOMY     • PROSTATE BIOPSY N/A 6/29/2020    Procedure: PROSTATE ULTRASOUND URONAV FUSION BIOPSY.;  Surgeon: Watson Sheehan MD;  Location: [facility];  Service: Urology;  Laterality: N/A;           Review  of systems  Constitutional: Negative for chills or fever.   Gastrointestinal: Negative for abdominal pain, anal bleeding or blood in stool.   Genitourinary: Negative for flank pain or hematuria        Objective   PHYSICAL EXAM  Vital Signs:   Temp 98.6 °F (37 °C)   Ht 170.2 cm (67\")   Wt 90 kg (198 lb 6.4 oz)   BMI 31.07 kg/m²     Constitutional: Patient is without distress or deformity.  Vital signs are reviewed as above.    Neuro: No confusion; No disorientation; Alert and oriented  Pulmonary: No respiratory distress.   Skin: No pallor or diaphoresis      DATA  Result Review :              Results for orders placed or performed in visit on 12/07/21   POC Urinalysis Dipstick, Multipro    Specimen: Urine   Result Value Ref Range    Color Yellow Yellow, Straw, Dark Yellow, Thu    Clarity, UA Clear Clear    Glucose, UA Negative Negative, 1000 mg/dL (3+) mg/dL    Bilirubin Negative Negative    Ketones, UA Negative Negative    Specific Gravity  1.015 1.005 - 1.030    Blood, UA Negative Negative    pH, Urine 6.5 5.0 - 8.0    Protein, POC Negative Negative mg/dL    Urobilinogen, UA Normal Normal    Nitrite, UA Negative Negative    Leukocytes Negative Negative       MRI Pelvis With & Without Contrast (11/27/2021 10:13)    The images for the above \"link(s)\" were made available to me to review independently.  I also reviewed the radiologist's " report described above with regard to the urologic findings. My interpretation is as follows:  I am able to see the 2 lesions as described above.  This new lesion is less than 1 cm in anterior aspect of transition zone that would likely require passing the needle through the urethra.  I think it would be difficult even hit.  Previous lesion that was used to identify the prostate cancer is really unchanged.  Gland measures just under 80 mL.  Symmetric appearing gland.  Transition zone consistent with BPH.  /Watson Sheehan MD    Lab Results   Component Value Date    PSA 6.450 (H) 11/15/2021    PSA 4.670 (H) 05/06/2021    PSA 5.860 (H) 06/19/2020          ASSESSMENT AND PLAN          Problem List Items Addressed This Visit        Genitourinary and Reproductive     Erectile dysfunction       Hematology and Neoplasia    Prostate cancer (HCC) - Primary    Relevant Orders    POC Urinalysis Dipstick, Multipro (Completed)      We discussed active surveillance again.  I again reiterated the rationale for active surveillance.  He says he is very comfortable with this approach.  In fact he does not want to have another biopsy at this point.  At 75 years old, the low-grade low risk prostate cancer that he has would likely take at least 10 and probably even 15 years to progress.  He would like me to continue a watchful waiting approach on him unless we see considerable increase in his PSA.  He did not want me to use the MRI to do another biopsy at this point.  I think it is a very reasonable approach to postpone biopsy at this time.  We might consider another MRI in about a year since were able to see the lesion that was positive and might see some change in this anterior lesion.    Doing well from erectile dysfunction standpoint at this point he would like me to discontinue the sildenafil to present dose.      FOLLOW UP     Return in about 6 months (around 6/7/2022).        (Please note that portions of this note were completed  with a voice recognition program.)  Watson Sheehan MD  12/08/21  15:01 CST

## 2022-02-17 RX ORDER — SILDENAFIL 100 MG/1
TABLET, FILM COATED ORAL
Qty: 20 TABLET | Refills: 0 | Status: SHIPPED | OUTPATIENT
Start: 2022-02-17 | End: 2022-05-27

## 2022-02-22 RX ORDER — AMITRIPTYLINE HYDROCHLORIDE 25 MG/1
TABLET, FILM COATED ORAL
Qty: 90 TABLET | Refills: 3 | Status: SHIPPED | OUTPATIENT
Start: 2022-02-22 | End: 2023-03-09

## 2022-02-22 RX ORDER — EZETIMIBE 10 MG/1
TABLET ORAL
Qty: 90 TABLET | Refills: 3 | Status: SHIPPED | OUTPATIENT
Start: 2022-02-22 | End: 2022-12-13

## 2022-02-22 RX ORDER — METOPROLOL TARTRATE 50 MG/1
TABLET, FILM COATED ORAL
Qty: 180 TABLET | Refills: 3 | Status: SHIPPED | OUTPATIENT
Start: 2022-02-22 | End: 2023-02-28 | Stop reason: SDUPTHER

## 2022-02-22 RX ORDER — AMLODIPINE BESYLATE 5 MG/1
TABLET ORAL
Qty: 90 TABLET | Refills: 3 | Status: SHIPPED | OUTPATIENT
Start: 2022-02-22 | End: 2023-02-28 | Stop reason: SDUPTHER

## 2022-02-23 ENCOUNTER — LAB (OUTPATIENT)
Dept: FAMILY MEDICINE CLINIC | Facility: CLINIC | Age: 77
End: 2022-02-23

## 2022-02-23 DIAGNOSIS — E78.2 MIXED HYPERLIPIDEMIA: ICD-10-CM

## 2022-02-23 DIAGNOSIS — F51.01 PRIMARY INSOMNIA: ICD-10-CM

## 2022-02-23 DIAGNOSIS — I10 PRIMARY HYPERTENSION: Primary | ICD-10-CM

## 2022-02-23 DIAGNOSIS — K21.9 GERD WITHOUT ESOPHAGITIS: ICD-10-CM

## 2022-02-24 LAB
ALBUMIN SERPL-MCNC: 4.3 G/DL (ref 3.5–5.2)
ALBUMIN/GLOB SERPL: 1.5 G/DL
ALP SERPL-CCNC: 105 U/L (ref 39–117)
ALT SERPL-CCNC: 16 U/L (ref 1–41)
AST SERPL-CCNC: 17 U/L (ref 1–40)
BASOPHILS # BLD AUTO: 0.04 10*3/MM3 (ref 0–0.2)
BASOPHILS NFR BLD AUTO: 0.7 % (ref 0–1.5)
BILIRUB SERPL-MCNC: 0.3 MG/DL (ref 0–1.2)
BUN SERPL-MCNC: 19 MG/DL (ref 8–23)
BUN/CREAT SERPL: 16.2 (ref 7–25)
CALCIUM SERPL-MCNC: 9.4 MG/DL (ref 8.6–10.5)
CHLORIDE SERPL-SCNC: 105 MMOL/L (ref 98–107)
CHOLEST SERPL-MCNC: 205 MG/DL (ref 0–200)
CHOLEST/HDLC SERPL: 5.13 {RATIO}
CO2 SERPL-SCNC: 27.5 MMOL/L (ref 22–29)
CREAT SERPL-MCNC: 1.17 MG/DL (ref 0.76–1.27)
EOSINOPHIL # BLD AUTO: 0.17 10*3/MM3 (ref 0–0.4)
EOSINOPHIL NFR BLD AUTO: 3 % (ref 0.3–6.2)
ERYTHROCYTE [DISTWIDTH] IN BLOOD BY AUTOMATED COUNT: 12.8 % (ref 12.3–15.4)
GLOBULIN SER CALC-MCNC: 2.8 GM/DL
GLUCOSE SERPL-MCNC: 103 MG/DL (ref 65–99)
HCT VFR BLD AUTO: 50.3 % (ref 37.5–51)
HDLC SERPL-MCNC: 40 MG/DL (ref 40–60)
HGB BLD-MCNC: 16.9 G/DL (ref 13–17.7)
IMM GRANULOCYTES # BLD AUTO: 0.02 10*3/MM3 (ref 0–0.05)
IMM GRANULOCYTES NFR BLD AUTO: 0.4 % (ref 0–0.5)
LDLC SERPL CALC-MCNC: 140 MG/DL (ref 0–100)
LYMPHOCYTES # BLD AUTO: 1.19 10*3/MM3 (ref 0.7–3.1)
LYMPHOCYTES NFR BLD AUTO: 20.9 % (ref 19.6–45.3)
MCH RBC QN AUTO: 30.5 PG (ref 26.6–33)
MCHC RBC AUTO-ENTMCNC: 33.6 G/DL (ref 31.5–35.7)
MCV RBC AUTO: 90.8 FL (ref 79–97)
MONOCYTES # BLD AUTO: 0.62 10*3/MM3 (ref 0.1–0.9)
MONOCYTES NFR BLD AUTO: 10.9 % (ref 5–12)
NEUTROPHILS # BLD AUTO: 3.66 10*3/MM3 (ref 1.7–7)
NEUTROPHILS NFR BLD AUTO: 64.1 % (ref 42.7–76)
NRBC BLD AUTO-RTO: 0 /100 WBC (ref 0–0.2)
PLATELET # BLD AUTO: 211 10*3/MM3 (ref 140–450)
POTASSIUM SERPL-SCNC: 4.5 MMOL/L (ref 3.5–5.2)
PROT SERPL-MCNC: 7.1 G/DL (ref 6–8.5)
RBC # BLD AUTO: 5.54 10*6/MM3 (ref 4.14–5.8)
SODIUM SERPL-SCNC: 143 MMOL/L (ref 136–145)
TRIGL SERPL-MCNC: 137 MG/DL (ref 0–150)
TSH SERPL DL<=0.005 MIU/L-ACNC: 1.62 UIU/ML (ref 0.27–4.2)
VLDLC SERPL CALC-MCNC: 25 MG/DL (ref 5–40)
WBC # BLD AUTO: 5.7 10*3/MM3 (ref 3.4–10.8)

## 2022-02-25 ENCOUNTER — OFFICE VISIT (OUTPATIENT)
Dept: FAMILY MEDICINE CLINIC | Facility: CLINIC | Age: 77
End: 2022-02-25

## 2022-02-25 VITALS
SYSTOLIC BLOOD PRESSURE: 118 MMHG | WEIGHT: 194 LBS | RESPIRATION RATE: 14 BRPM | DIASTOLIC BLOOD PRESSURE: 74 MMHG | OXYGEN SATURATION: 95 % | HEIGHT: 67 IN | BODY MASS INDEX: 30.45 KG/M2 | HEART RATE: 43 BPM

## 2022-02-25 DIAGNOSIS — K21.9 GERD WITHOUT ESOPHAGITIS: ICD-10-CM

## 2022-02-25 DIAGNOSIS — I10 PRIMARY HYPERTENSION: Primary | ICD-10-CM

## 2022-02-25 DIAGNOSIS — E78.2 MIXED HYPERLIPIDEMIA: ICD-10-CM

## 2022-02-25 PROCEDURE — 99213 OFFICE O/P EST LOW 20 MIN: CPT | Performed by: FAMILY MEDICINE

## 2022-02-25 NOTE — PROGRESS NOTES
Subjective   Nehemiah Gomez is a 76 y.o. male.     Chief Complaint   Patient presents with   • Hypertension     6 mo f/u  pt would like to discuss a different med for ED due to cost & efficacy        History of Present Illness     he notes having good bp contrl without cp ro ha---toerinag lipid tx and notes gerd symptoms are stable without dsypahgia      Current Outpatient Medications:   •  albuterol sulfate  (90 Base) MCG/ACT inhaler, Inhale 2 puffs Every 4 (Four) Hours As Needed for Wheezing., Disp: 6.7 g, Rfl: 0  •  amitriptyline (ELAVIL) 25 MG tablet, TAKE 1 TABLET EVERY NIGHT, Disp: 90 tablet, Rfl: 3  •  amLODIPine (NORVASC) 5 MG tablet, TAKE 1 TABLET DAILY, Disp: 90 tablet, Rfl: 3  •  aspirin (aspirin) 81 MG EC tablet, Take 81 mg by mouth Daily., Disp: , Rfl:   •  esomeprazole (nexIUM) 40 MG capsule, TAKE 1 CAPSULE DAILY, Disp: 90 capsule, Rfl: 3  •  ezetimibe (ZETIA) 10 MG tablet, TAKE 1 TABLET DAILY, Disp: 90 tablet, Rfl: 3  •  levocetirizine (Xyzal) 5 MG tablet, Take 1 tablet by mouth Every Evening., Disp: 30 tablet, Rfl: 0  •  metoprolol tartrate (LOPRESSOR) 50 MG tablet, TAKE 1 TABLET TWICE A DAY, Disp: 180 tablet, Rfl: 3  •  sildenafil (VIAGRA) 100 MG tablet, TAKE 1 TABLET ONCE DAILY AS NEEDED FOR ERECTILE DYSFUNCTION., Disp: 20 tablet, Rfl: 0  No Known Allergies    Patient's Body mass index is 30.38 kg/m². indicating that he is obese (BMI >30). Obesity-related health conditions include the following: hypertension. Obesity is unchanged. BMI is is above average; BMI management plan is completed. We discussed portion control and increasing exercise..      Past Medical History:   Diagnosis Date   • Diverticulosis    • Erectile disorder due to 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    • GERD (gastroesophageal reflux disease)    • Hyperlipidemia    • Hypertension    • Insomnia    • Kidney stone    • Prostate cancer (HCC)      Past Surgical History:   Procedure Laterality Date   • CHOLECYSTECTOMY     •  "PROSTATE BIOPSY N/A 6/29/2020    Procedure: PROSTATE ULTRASOUND URONAV FUSION BIOPSY.;  Surgeon: Watson Sheehan MD;  Location: [facility] OR;  Service: Urology;  Laterality: N/A;       Review of Systems   Constitutional: Negative.    HENT: Negative.    Eyes: Negative.    Respiratory: Negative.    Cardiovascular: Negative.    Gastrointestinal: Negative.    Endocrine: Negative.    Genitourinary: Negative.    Musculoskeletal: Negative.    Skin: Negative.    Allergic/Immunologic: Negative.    Neurological: Negative.    Hematological: Negative.    Psychiatric/Behavioral: Negative.        Objective  /74   Pulse (!) 43   Resp 14   Ht 170.2 cm (67\")   Wt 88 kg (194 lb)   SpO2 95%   BMI 30.38 kg/m²   Physical Exam  Vitals and nursing note reviewed.   Constitutional:       Appearance: Normal appearance. He is normal weight.   HENT:      Head: Normocephalic and atraumatic.      Nose: Nose normal.      Mouth/Throat:      Mouth: Mucous membranes are moist.   Eyes:      Extraocular Movements: Extraocular movements intact.      Conjunctiva/sclera: Conjunctivae normal.      Pupils: Pupils are equal, round, and reactive to light.   Cardiovascular:      Rate and Rhythm: Normal rate and regular rhythm.      Pulses: Normal pulses.      Heart sounds: Normal heart sounds.   Pulmonary:      Effort: Pulmonary effort is normal.      Breath sounds: Normal breath sounds.   Abdominal:      General: Abdomen is flat. Bowel sounds are normal.      Palpations: Abdomen is soft.   Musculoskeletal:         General: Normal range of motion.      Cervical back: Normal range of motion and neck supple.   Skin:     General: Skin is warm and dry.      Capillary Refill: Capillary refill takes less than 2 seconds.   Neurological:      General: No focal deficit present.      Mental Status: He is alert and oriented to person, place, and time. Mental status is at baseline.   Psychiatric:         Mood and Affect: Mood normal.         Behavior: Behavior " normal.         Thought Content: Thought content normal.         Judgment: Judgment normal.         Assessment/Plan   Diagnoses and all orders for this visit:    1. Primary hypertension (Primary)    2. Mixed hyperlipidemia    3. GERD without esophagitis      Get coipy of last colon from dr perales    We note his recent laBs         No orders of the defined types were placed in this encounter.      Follow up: 6 month(s)

## 2022-02-28 ENCOUNTER — TELEPHONE (OUTPATIENT)
Dept: FAMILY MEDICINE CLINIC | Facility: CLINIC | Age: 77
End: 2022-02-28

## 2022-03-01 DIAGNOSIS — Z12.11 SCREEN FOR COLON CANCER: Primary | ICD-10-CM

## 2022-04-11 RX ORDER — ESOMEPRAZOLE MAGNESIUM 40 MG/1
CAPSULE, DELAYED RELEASE ORAL
Qty: 90 CAPSULE | Refills: 11 | Status: SHIPPED | OUTPATIENT
Start: 2022-04-11 | End: 2022-12-28 | Stop reason: SDUPTHER

## 2022-04-20 ENCOUNTER — OFFICE VISIT (OUTPATIENT)
Dept: GASTROENTEROLOGY | Facility: CLINIC | Age: 77
End: 2022-04-20

## 2022-04-20 VITALS
HEART RATE: 63 BPM | DIASTOLIC BLOOD PRESSURE: 76 MMHG | WEIGHT: 195 LBS | BODY MASS INDEX: 30.61 KG/M2 | HEIGHT: 67 IN | OXYGEN SATURATION: 98 % | TEMPERATURE: 97 F | SYSTOLIC BLOOD PRESSURE: 120 MMHG

## 2022-04-20 DIAGNOSIS — Z12.11 ENCOUNTER FOR SCREENING FOR MALIGNANT NEOPLASM OF COLON: Primary | ICD-10-CM

## 2022-04-20 PROCEDURE — S0260 H&P FOR SURGERY: HCPCS | Performed by: NURSE PRACTITIONER

## 2022-04-20 NOTE — PROGRESS NOTES
Chief Complaint   Patient presents with   • Colonoscopy     3-17-16 colon 10 year recall diverticulosis       PCP: Erwin Ruelas MD  REFER: Erwin Ruelas, *    Subjective     HPI  Nehemiah Gomez is a 76 y.o. male. The patient presents to the office for routine colon screening. There is not a personal history of colon polyps. There is not a history of colon cancer.  The patient does not have complaints of nausea/vomiting, change in bowels, weight loss, no BRBPR, no melena. There is not  a family history of colon cancer in first degree relative.  There is not  a family history of colon polyps in first degree relative. His last colonoscopy was in 2016, and considered normal at that time. Procedure performed for routine colon screening. Bowels do  move on regular basis.      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     • esomeprazole (nexIUM) 40 MG capsule TAKE 1 CAPSULE DAILY 90 capsule 11   • ezetimibe (ZETIA) 10 MG tablet TAKE 1 TABLET DAILY 90 tablet 3   •  metoprolol tartrate (LOPRESSOR) 50 MG tablet TAKE 1 TABLET TWICE A  tablet 3   • sildenafil (VIAGRA) 100 MG tablet TAKE 1 TABLET ONCE DAILY AS NEEDED FOR ERECTILE DYSFUNCTION. 20 tablet 0     No Known Allergies  Social History     Socioeconomic History   • Marital status:    Tobacco Use   • Smoking status: Former Smoker     Years: 20.00     Types: Cigarettes     Quit date:      Years since quittin.3   • Smokeless tobacco: Never Used   Vaping Use   • Vaping Use: Never used   Substance and Sexual Activity   • Alcohol use: Yes     Comment: very little   • Drug use: No   • Sexual activity: Defer     Review of Systems   Constitutional: Negative for unexpected weight change.   Respiratory: Negative for shortness of breath.    Cardiovascular: Negative for chest pain.   Gastrointestinal: Negative for abdominal pain and anal bleeding.     Objective   Vitals:    22 0957   BP: 120/76   Pulse: 63   Temp: 97 °F (36.1 °C)   SpO2: 98%     Physical Exam  Constitutional:       Appearance: Normal appearance. He is well-developed.   Eyes:      General: No scleral icterus.  Neck:      Thyroid: No thyroid mass or thyromegaly.      Vascular: No JVD.   Pulmonary:      Effort: Pulmonary effort is normal. No accessory muscle usage.   Abdominal:      General: There is no distension.      Tenderness: There is no abdominal tenderness. There is no guarding.   Skin:     Coloration: Skin is not jaundiced.   Neurological:      Mental Status: He is alert.   Psychiatric:         Behavior: Behavior is cooperative.         Judgment: Judgment normal.       Imaging Results (Most Recent)     None        Body mass index is 30.54 kg/m².    Assessment/Plan   There are no diagnoses linked to this encounter.    Colon screening.  - No need for colonoscopy evaluation as patient had routine colon screening in 2016 under Medicare. Per Medicare guidelines, they will only pay for 1 colon screening every 10 years. Patient does not have  any GI symptoms that would warrant earlier procedure. I did discuss proceeding with checking his stool for occult blood. Hemoglobin is currently stable at 16.9 when checked in 02/2022 and do not feel stools would be positive. I did present patient with this information. He declined stool testing at this time. He will contact office if he changes his mind and wishes to proceed with stool testing.    * Surgery not found *          Transcribed from ambient dictation for NICANOR Ellison by Melina Holliday.  04/20/22   12:24 CDT    Patient verbalized consent to the visit recording.        There are no Patient Instructions on file for this visit.

## 2022-05-17 DIAGNOSIS — J30.2 SEASONAL ALLERGIES: ICD-10-CM

## 2022-05-17 RX ORDER — LEVOCETIRIZINE DIHYDROCHLORIDE 5 MG/1
5 TABLET, FILM COATED ORAL EVERY EVENING
Qty: 30 TABLET | Refills: 0 | Status: SHIPPED | OUTPATIENT
Start: 2022-05-17 | End: 2022-12-13

## 2022-05-21 ENCOUNTER — OFFICE VISIT (OUTPATIENT)
Age: 77
End: 2022-05-21
Payer: MEDICARE

## 2022-05-21 VITALS
TEMPERATURE: 97.1 F | HEART RATE: 66 BPM | SYSTOLIC BLOOD PRESSURE: 128 MMHG | RESPIRATION RATE: 18 BRPM | HEIGHT: 67 IN | DIASTOLIC BLOOD PRESSURE: 80 MMHG | OXYGEN SATURATION: 97 % | BODY MASS INDEX: 29.91 KG/M2 | WEIGHT: 190.6 LBS

## 2022-05-21 DIAGNOSIS — Z11.52 ENCOUNTER FOR SCREENING FOR COVID-19: ICD-10-CM

## 2022-05-21 DIAGNOSIS — J20.9 ACUTE BRONCHITIS, UNSPECIFIED ORGANISM: Primary | ICD-10-CM

## 2022-05-21 DIAGNOSIS — H10.32 ACUTE BACTERIAL CONJUNCTIVITIS OF LEFT EYE: ICD-10-CM

## 2022-05-21 DIAGNOSIS — R09.81 NASAL CONGESTION: ICD-10-CM

## 2022-05-21 LAB — SARS-COV-2, PCR: NOT DETECTED

## 2022-05-21 PROCEDURE — 3017F COLORECTAL CA SCREEN DOC REV: CPT

## 2022-05-21 PROCEDURE — 1123F ACP DISCUSS/DSCN MKR DOCD: CPT

## 2022-05-21 PROCEDURE — G8428 CUR MEDS NOT DOCUMENT: HCPCS

## 2022-05-21 PROCEDURE — 1036F TOBACCO NON-USER: CPT

## 2022-05-21 PROCEDURE — G8417 CALC BMI ABV UP PARAM F/U: HCPCS

## 2022-05-21 PROCEDURE — 99203 OFFICE O/P NEW LOW 30 MIN: CPT

## 2022-05-21 RX ORDER — AZITHROMYCIN 250 MG/1
250 TABLET, FILM COATED ORAL SEE ADMIN INSTRUCTIONS
Qty: 6 TABLET | Refills: 0 | Status: SHIPPED | OUTPATIENT
Start: 2022-05-21 | End: 2022-05-26

## 2022-05-21 RX ORDER — LEVOCETIRIZINE DIHYDROCHLORIDE 5 MG/1
TABLET, FILM COATED ORAL
COMMUNITY
Start: 2022-05-17

## 2022-05-21 RX ORDER — ERYTHROMYCIN 5 MG/G
OINTMENT OPHTHALMIC
Qty: 3.5 G | Refills: 0 | Status: SHIPPED | OUTPATIENT
Start: 2022-05-21 | End: 2022-05-31

## 2022-05-21 RX ORDER — FLUTICASONE PROPIONATE 50 MCG
1 SPRAY, SUSPENSION (ML) NASAL DAILY
Qty: 16 G | Refills: 0 | Status: SHIPPED | OUTPATIENT
Start: 2022-05-21

## 2022-05-21 RX ORDER — PREDNISONE 20 MG/1
10 TABLET ORAL 2 TIMES DAILY
Qty: 5 TABLET | Refills: 0 | Status: SHIPPED | OUTPATIENT
Start: 2022-05-21 | End: 2022-05-26

## 2022-05-21 ASSESSMENT — ENCOUNTER SYMPTOMS
EYE REDNESS: 1
EYE DISCHARGE: 0
SHORTNESS OF BREATH: 0
COUGH: 1
PHOTOPHOBIA: 0

## 2022-05-21 NOTE — PATIENT INSTRUCTIONS
1. Covid test results will be called to you when they are available. 2. Continue taking xyzal and start flonase 1 spray each nostril daily  3. Start oral steroid and take for 5 days  4. OTC cough/cold medications are okay -follow label instructions  5. Tylenol or motrin for pain or fever  6. Warm salt water gargles or warm liquids for comfort. Warm tea with a tablespoon of honey is excellent for sore throat. 7. If symptoms worsen, please seek reevaluation    For left eye  1. Clean good eye first. Use warm, moist washcloth to clean eye and wipe from inner corner by nose to outer corner. Use a clean section of the washcloth with each wipe. 2. Use erythromycin ointment every 4 hours for at least one week. Do not touch tip of ointment to eye  3. Wash hands really well before and after touching eyes  4. Make sure everyone else in home also washing hands frequently  5. Monitor for severe eye pain, loss of vision or \"floaters\" - go to ER if it develops        Patient Education        Bronchitis: Care Instructions  Your Care Instructions     Bronchitis is inflammation of the bronchial tubes, which carry air to the lungs. The tubes swell and produce mucus, or phlegm. The mucus and inflamedbronchial tubes make you cough. You may have trouble breathing. Most cases of bronchitis are caused by viruses like those that cause colds. Antibiotics usually do not help and they may be harmful. Bronchitis usually develops rapidly and lasts about 2 to 3 weeks in otherwisehealthy people. Follow-up care is a key part of your treatment and safety. Be sure to make and go to all appointments, and call your doctor if you are having problems. It's also a good idea to know your test results and keep alist of the medicines you take. How can you care for yourself at home?  Take all medicines exactly as prescribed. Call your doctor if you think you are having a problem with your medicine.    Get some extra rest.   Take an over-the-counter pain medicine, such as acetaminophen (Tylenol), ibuprofen (Advil, Motrin), or naproxen (Aleve) to reduce fever and relieve body aches. Read and follow all instructions on the label.  Do not take two or more pain medicines at the same time unless the doctor told you to. Many pain medicines have acetaminophen, which is Tylenol. Too much acetaminophen (Tylenol) can be harmful.  Take an over-the-counter cough medicine to help quiet a dry, hacking cough so that you can sleep. Avoid cough medicines that have more than one active ingredient. Read and follow all instructions on the label.  Do not smoke. Smoking can make bronchitis worse. If you need help quitting, talk to your doctor about stop-smoking programs and medicines. These can increase your chances of quitting for good. When should you call for help? Call 911 anytime you think you may need emergency care. For example, call if:     You have severe trouble breathing. Call your doctor now or seek immediate medical care if:     You have new or worse trouble breathing.      You cough up dark brown or bloody mucus (sputum).      You have a new or higher fever.      You have a new rash. Watch closely for changes in your health, and be sure to contact your doctor if:     You cough more deeply or more often, especially if you notice more mucus or a change in the color of your mucus.      You are not getting better as expected. Where can you learn more? Go to https://[employer]acars.[employer]. org and sign in to your [employer] account. Enter H333 in the [facility] box to learn more about \"Bronchitis: Care Instructions. \"     If you do not have an account, please click on the \"Sign Up Now\" link. Current as of: July 6, 2021               Content Version: 13.2  © 1900-0715 Healthwise, Incorporated. Care instructions adapted under license by [facility] ([facility]).  If you have questions about a medical condition or this instruction, always ask your healthcare professional. [place] [address] any warranty or liability for your use of this information. Patient Education        Pinkeye: Care Instructions  Overview     Pinkeye is redness and swelling of the eye surface and the conjunctiva (the lining of the eyelid and the covering of the white part of the eye). Pinkeye is also called conjunctivitis. Pinkeye is often caused by infection with bacteriaor a virus. Dry air, allergies, smoke, and chemicals are other common causes. Pinkeye often gets better on its own in 7 to 10 days. Antibiotics only help if the pinkeye is caused by bacteria. Pinkeye caused by infection spreads easily. If an allergy or chemical is causing pinkeye, it will not go away unless youcan avoid whatever is causing it. Follow-up care is a key part of your treatment and safety. Be sure to make and go to all appointments, and call your doctor if you are having problems. It's also a good idea to know your test results and keep alist of the medicines you take. How can you care for yourself at home?  Wash your hands often. Always wash them before and after you treat pinkeye or touch your eyes or face.  Use moist cotton or a clean, wet cloth to remove crust. Wipe from the inside corner of the eye to the outside. Use a clean part of the cloth for each wipe.  Put cold or warm wet cloths on your eye a few times a day if the eye hurts.  Do not wear contact lenses or eye makeup until the pinkeye is gone. Throw away any eye makeup you were using when you got pinkeye. Clean your contacts and storage case. If you wear disposable contacts, use a new pair when your eye has cleared and it is safe to wear contacts again.  If the doctor gave you antibiotic ointment or eyedrops, use them as directed. Use the medicine for as long as instructed, even if your eye starts looking better soon. Keep the bottle tip clean, and do not let it touch the eye area.    To put in eyedrops or ointment:  ? Tilt your head back, and pull your lower eyelid down with one finger. ? Drop or squirt the medicine inside the lower lid. ? Close your eye for 30 to 60 seconds to let the drops or ointment move around. ? Do not touch the ointment or dropper tip to your eyelashes or any other surface.  Do not share towels, pillows, or washcloths while you have pinkeye. When should you call for help? Call your doctor now or seek immediate medical care if:     You have pain in your eye, not just irritation on the surface.      You have a change in vision or loss of vision.      You have an increase in discharge from the eye.      Your eye has not started to improve or begins to get worse within 48 hours after you start using antibiotics.      Pinkeye lasts longer than 7 days. Watch closely for changes in your health, and be sure to contact your doctor ifyou have any problems. Where can you learn more? Go to https://[employer]. org and sign in to your [employer] account. Enter Y392 in the [employer] box to learn more about \"Pinkeye: Care Instructions. \"     If you do not have an account, please click on the \"Sign Up Now\" link. Current as of: July 1, 2021               Content Version: 13.2  © 2006-2022 Healthwise, Incorporated. Care instructions adapted under license by [facility] ([facility]). If you have questions about a medical condition or this instruction, always ask your healthcare professional. [place] [address] any warranty or liability for your use of this information.

## 2022-05-21 NOTE — PROGRESS NOTES
[address]  [address] [facility] [place] [address] [address]  Dept: [phone]  Dept Fax: [phone]  Loc: [phone]    Gary Pitts is a 77 y.o. male who presents today for his medical conditions/complaints as noted below. Gary Pitts is c/o of Cough, Congestion, and Facial Swelling (Left)        HPI:     HPI  Kat Nixon presents with complaints of congestion and productive cough for 1 month. He reports 2 days ago he developed left eye redness and swelling as well. He denies eye pain and discharge, reports a mild discomfort. He denies fever, body aches and shortness of breath. He has been using mucinex, nyquil, aspirin and xyzal for his symptoms without relief. He denies recent antibiotics and steroids.      Past Medical History:   Diagnosis Date    GERD (gastroesophageal reflux disease)     History of SCC (squamous cell carcinoma) of skin     Hyperlipidemia     Hypertension      Past Surgical History:   Procedure Laterality Date    CARDIAC CATHETERIZATION      normal    CHOLECYSTECTOMY  14    SKIN CANCER EXCISION  2011    squamous       Family History   Problem Relation Age of Onset    Heart Failure Mother     Stroke Father     Heart Disease Brother        Social History     Tobacco Use    Smoking status: Former Smoker     Packs/day: 2.00     Years: 25.00     Pack years: 50.00     Types: Cigarettes     Quit date: 1984     Years since quittin.4    Smokeless tobacco: Never Used    Tobacco comment: retired from [employer]   Substance Use Topics    Alcohol use: Yes     Comment: rare      Current Outpatient Medications   Medication Sig Dispense Refill    levocetirizine (XYZAL) 5 MG tablet TAKE 1 TABLET BY MOUTH EVERY EVENING      predniSONE (DELTASONE) 20 MG tablet Take 0.5 tablets by mouth 2 times daily for 5 days 5 tablet 0    azithromycin (ZITHROMAX) 250 MG tablet Take 1 tablet by mouth See Admin Instructions for 5 days 500mg on day 1 followed by 250mg on days 2 - 5 6 tablet 0    fluticasone (FLONASE) 50 MCG/ACT nasal spray 1 spray by Each Nostril route daily 16 g 0    erythromycin (ROMYCIN) 5 MG/GM ophthalmic ointment Instill 1 cm ribbon in Left lower eyelid every 4-6 hours while awake for 7 days 3.5 g 0    metoprolol (LOPRESSOR) 50 MG tablet Take 50 mg by mouth 2 times daily.  esomeprazole Magnesium (NEXIUM) 40 MG PACK Take 40 mg by mouth daily       ezetimibe (ZETIA) 10 MG tablet Take 10 mg by mouth daily.  amLODIPine (NORVASC) 5 MG tablet Take 5 mg by mouth daily.  sildenafil (VIAGRA) 100 MG tablet Take 100 mg by mouth as needed for Erectile Dysfunction.  aspirin 81 MG chewable tablet Take 81 mg by mouth daily.  amitriptyline (ELAVIL) 10 MG tablet Take 5 mg by mouth nightly as needed for Sleep.  Omega-3 Fatty Acids (FISH OIL) 1000 MG CPDR Take  by mouth. (Patient not taking: Reported on 5/21/2022)      Coenzyme Q10 (COQ10) 100 MG CAPS Take  by mouth. (Patient not taking: Reported on 5/21/2022)      LevOCARNitine (L-CARNITINE) 250 MG CAPS Take  by mouth. (Patient not taking: Reported on 5/21/2022)       No current facility-administered medications for this visit. No Known Allergies    Health Maintenance   Topic Date Due    Depression Screen  Never done    Hepatitis C screen  Never done    DTaP/Tdap/Td vaccine (1 - Tdap) Never done    Lipids  Never done    Colorectal Cancer Screen  Never done    Shingles vaccine (1 of 2) Never done    Pneumococcal 65+ years Vaccine (2 - PPSV23 or PCV20) 10/21/2016    COVID-19 Vaccine (3 - Booster for Moderna series) 09/13/2021    Flu vaccine (Season Ended) 09/01/2022    AAA screen  Completed    Hepatitis A vaccine  Aged Out    Hepatitis B vaccine  Aged Out    Hib vaccine  Aged Out    Meningococcal (ACWY) vaccine  Aged Out       Subjective:     Review of Systems   Constitutional: Negative for fever. HENT: Positive for congestion.     Eyes: Positive for redness. Negative for photophobia, discharge and visual disturbance. Respiratory: Positive for cough. Negative for shortness of breath. Musculoskeletal: Negative for myalgias.       :Objective      Physical Exam  Constitutional:       General: He is not in acute distress. Appearance: Normal appearance. He is not toxic-appearing. HENT:      Head: Normocephalic and atraumatic. Right Ear: Tympanic membrane, ear canal and external ear normal.      Left Ear: Tympanic membrane, ear canal and external ear normal.      Nose: Congestion present. Mouth/Throat:      Mouth: Mucous membranes are moist.      Pharynx: Oropharynx is clear. Posterior oropharyngeal erythema present. No oropharyngeal exudate. Eyes:      General:         Right eye: No discharge. Left eye: No discharge. Conjunctiva/sclera: Conjunctivae normal.   Cardiovascular:      Rate and Rhythm: Normal rate and regular rhythm. Pulmonary:      Effort: Pulmonary effort is normal. No respiratory distress. Breath sounds: Examination of the right-upper field reveals wheezing. Wheezing (faint) present. Abdominal:      General: Abdomen is flat. Palpations: Abdomen is soft. Musculoskeletal:         General: Normal range of motion. Cervical back: Normal range of motion. Lymphadenopathy:      Cervical: No cervical adenopathy. Skin:     General: Skin is warm and dry. Capillary Refill: Capillary refill takes less than 2 seconds. Findings: No rash. Neurological:      General: No focal deficit present. Mental Status: He is alert. Psychiatric:         Mood and Affect: Mood normal.       /80   Pulse 66   Temp 97.1 °F (36.2 °C)   Resp 18   Ht 5' 7\" (1.702 m)   Wt 190 lb 9.6 oz (86.5 kg)   SpO2 97%   BMI 29.85 kg/m²     :Assessment       Diagnosis Orders   1. Acute bronchitis, unspecified organism  predniSONE (DELTASONE) 20 MG tablet    azithromycin (ZITHROMAX) 250 MG tablet   2. Nasal congestion  fluticasone (FLONASE) 50 MCG/ACT nasal spray   3. Encounter for screening for COVID-19  COVID-19   4. Acute bacterial conjunctivitis of left eye  erythromycin (ROMYCIN) 5 MG/GM ophthalmic ointment       :Plan   Continue xyzal, add flonase. Z pack and steroids for bronchitis and slight wheezing. E mycin ointment for conjunctivitis. Return precautions and home care education completed. Patient verbalized understanding. Orders Placed This Encounter   Procedures    COVID-19     Scheduling Instructions:      1) Due to current limited availability of the COVID-19 test, tests will be prioritized based on responses to questions above. Testing may be delayed due to volume. 2) Print and instruct patient to adhere to CDC home isolation program. (Link Above)              3) Set up or refer patient for a monitoring program.              4) Have patient sign up for and leverage MyChart (if not previously done). Order Specific Question:   Is this test for diagnosis or screening? Answer:   Diagnosis of ill patient     Order Specific Question:   Symptomatic for COVID-19 as defined by CDC? Answer:   Yes     Order Specific Question:   Date of Symptom Onset     Answer:   5/20/2022     Order Specific Question:   Hospitalized for COVID-19? Answer:   No     Order Specific Question:   Admitted to ICU for COVID-19? Answer:   No     Order Specific Question:   Employed in healthcare setting? Answer:   No     Order Specific Question:   Resident in a congregate (group) care setting? Answer:   No     Order Specific Question:   Pregnant: Answer:   No     Order Specific Question:   Previously tested for COVID-19? Answer:   Unknown    COVID-19    COVID-19       No follow-ups on file.     Orders Placed This Encounter   Medications    predniSONE (DELTASONE) 20 MG tablet     Sig: Take 0.5 tablets by mouth 2 times daily for 5 days     Dispense:  5 tablet     Refill:  0    azithromycin (ZITHROMAX) 250 MG tablet     Sig: Take 1 tablet by mouth See Admin Instructions for 5 days 500mg on day 1 followed by 250mg on days 2 - 5     Dispense:  6 tablet     Refill:  0    fluticasone (FLONASE) 50 MCG/ACT nasal spray     Si spray by Each Nostril route daily     Dispense:  16 g     Refill:  0    erythromycin (ROMYCIN) 5 MG/GM ophthalmic ointment     Sig: Instill 1 cm ribbon in Left lower eyelid every 4-6 hours while awake for 7 days     Dispense:  3.5 g     Refill:  0       Patient given educational materials- see patient instructions. Discussed use, benefit, and side effects of prescribed medications. All patient questions answered. Pt voiced understanding. Patient Instructions     1. Covid test results will be called to you when they are available. 2. Continue taking xyzal and start flonase 1 spray each nostril daily  3. Start oral steroid and take for 5 days  4. OTC cough/cold medications are okay -follow label instructions  5. Tylenol or motrin for pain or fever  6. Warm salt water gargles or warm liquids for comfort. Warm tea with a tablespoon of honey is excellent for sore throat. 7. If symptoms worsen, please seek reevaluation    For left eye  1. Clean good eye first. Use warm, moist washcloth to clean eye and wipe from inner corner by nose to outer corner. Use a clean section of the washcloth with each wipe. 2. Use erythromycin ointment every 4 hours for at least one week. Do not touch tip of ointment to eye  3. Wash hands really well before and after touching eyes  4. Make sure everyone else in home also washing hands frequently  5. Monitor for severe eye pain, loss of vision or \"floaters\" - go to ER if it develops        Patient Education        Bronchitis: Care Instructions  Your Care Instructions     Bronchitis is inflammation of the bronchial tubes, which carry air to the lungs. The tubes swell and produce mucus, or phlegm.  The mucus and inflamedbronchial tubes make you cough. You may have trouble breathing. Most cases of bronchitis are caused by viruses like those that cause colds. Antibiotics usually do not help and they may be harmful. Bronchitis usually develops rapidly and lasts about 2 to 3 weeks in otherwisehealthy people. Follow-up care is a key part of your treatment and safety. Be sure to make and go to all appointments, and call your doctor if you are having problems. It's also a good idea to know your test results and keep alist of the medicines you take. How can you care for yourself at home?  Take all medicines exactly as prescribed. Call your doctor if you think you are having a problem with your medicine.  Get some extra rest.   Take an over-the-counter pain medicine, such as acetaminophen (Tylenol), ibuprofen (Advil, Motrin), or naproxen (Aleve) to reduce fever and relieve body aches. Read and follow all instructions on the label.  Do not take two or more pain medicines at the same time unless the doctor told you to. Many pain medicines have acetaminophen, which is Tylenol. Too much acetaminophen (Tylenol) can be harmful.  Take an over-the-counter cough medicine to help quiet a dry, hacking cough so that you can sleep. Avoid cough medicines that have more than one active ingredient. Read and follow all instructions on the label.  Do not smoke. Smoking can make bronchitis worse. If you need help quitting, talk to your doctor about stop-smoking programs and medicines. These can increase your chances of quitting for good. When should you call for help? Call 911 anytime you think you may need emergency care. For example, call if:     You have severe trouble breathing. Call your doctor now or seek immediate medical care if:     You have new or worse trouble breathing.      You cough up dark brown or bloody mucus (sputum).      You have a new or higher fever.      You have a new rash.    Watch closely for changes in your health, and be sure to contact your doctor if:     You cough more deeply or more often, especially if you notice more mucus or a change in the color of your mucus.      You are not getting better as expected. Where can you learn more? Go to https://ManflupeRivet Gameseweb.Fix8. org and sign in to your MarginLeft account. Enter H333 in the [facility] box to learn more about \"Bronchitis: Care Instructions. \"     If you do not have an account, please click on the \"Sign Up Now\" link. Current as of: July 6, 2021               Content Version: 13.2  © 7871-4497 [employer]. Care instructions adapted under license by [facility] ([facility]). If you have questions about a medical condition or this instruction, always ask your healthcare professional. [address] any warranty or liability for your use of this information. Patient Education        Pinkeye: Care Instructions  Overview     Pinkeye is redness and swelling of the eye surface and the conjunctiva (the lining of the eyelid and the covering of the white part of the eye). Pinkeye is also called conjunctivitis. Pinkeye is often caused by infection with bacteriaor a virus. Dry air, allergies, smoke, and chemicals are other common causes. Pinkeye often gets better on its own in 7 to 10 days. Antibiotics only help if the pinkeye is caused by bacteria. Pinkeye caused by infection spreads easily. If an allergy or chemical is causing pinkeye, it will not go away unless youcan avoid whatever is causing it. Follow-up care is a key part of your treatment and safety. Be sure to make and go to all appointments, and call your doctor if you are having problems. It's also a good idea to know your test results and keep alist of the medicines you take. How can you care for yourself at home?  Wash your hands often. Always wash them before and after you treat pinkeye or touch your eyes or face.    Use moist cotton or a clean, wet cloth to remove crust. Wipe from the inside corner of the eye to the outside. Use a clean part of the cloth for each wipe.  Put cold or warm wet cloths on your eye a few times a day if the eye hurts.  Do not wear contact lenses or eye makeup until the pinkeye is gone. Throw away any eye makeup you were using when you got pinkeye. Clean your contacts and storage case. If you wear disposable contacts, use a new pair when your eye has cleared and it is safe to wear contacts again.  If the doctor gave you antibiotic ointment or eyedrops, use them as directed. Use the medicine for as long as instructed, even if your eye starts looking better soon. Keep the bottle tip clean, and do not let it touch the eye area.  To put in eyedrops or ointment:  ? Tilt your head back, and pull your lower eyelid down with one finger. ? Drop or squirt the medicine inside the lower lid. ? Close your eye for 30 to 60 seconds to let the drops or ointment move around. ? Do not touch the ointment or dropper tip to your eyelashes or any other surface.  Do not share towels, pillows, or washcloths while you have pinkeye. When should you call for help? Call your doctor now or seek immediate medical care if:     You have pain in your eye, not just irritation on the surface.      You have a change in vision or loss of vision.      You have an increase in discharge from the eye.      Your eye has not started to improve or begins to get worse within 48 hours after you start using antibiotics.      Pinkeye lasts longer than 7 days. Watch closely for changes in your health, and be sure to contact your doctor ifyou have any problems. Where can you learn more? Go to https://mcTELperhondaeweb.TradeTools FX. org and sign in to your proteonomix account. Enter Y392 in the [employer] box to learn more about \"Pinkeye: Care Instructions. \"     If you do not have an account, please click on the \"Sign Up Now\" link.   Current as of: July 1, 2021               Content Version: 13.2  © 5419-8719 [employer]. Care instructions adapted under license by [facility] ([facility]). If you have questions about a medical condition or this instruction, always ask your healthcare professional. [place] [address] any warranty or liability for your use of this information.                Electronically signed by TAMIR Orantes CNP on 5/21/2022 at 11:11 AM

## 2022-05-27 RX ORDER — SILDENAFIL 100 MG/1
TABLET, FILM COATED ORAL
Qty: 20 TABLET | Refills: 0 | Status: SHIPPED | OUTPATIENT
Start: 2022-05-27 | End: 2022-08-15

## 2022-05-27 NOTE — TELEPHONE ENCOUNTER
Rx Refill Note  Requested Prescriptions     Pending Prescriptions Disp Refills   • sildenafil (VIAGRA) 100 MG tablet [Pharmacy Med Name: SILDENAFIL 100MG TABLET] 20 tablet 0     Sig: TAKE 1 TABLET ONCE DAILY AS NEEDED FOR ERECTILE DYSFUNCTION.      Last office visit with prescribing clinician: 2/25/2022      Next office visit with prescribing clinician: 8/26/2022            Marcy Herrera, PCT  05/27/22, 11:48 CDT

## 2022-06-13 NOTE — PROGRESS NOTES
Chief Complaint  Prostate cancer    Subjective          Nehemiah Gomez presents to [facility] UROLOGY   He is here to follow up prostate cancer. He was diagnosed with UroNav fusion biopsy in June 2020.  Op note and pathology report per following link. Op Note by Watson Sheehan MD (06/29/2020 09:21)  Tissue Pathology Exam (06/29/2020 07:01). He had a repeat MRI six months ago that showed a new small (<1cm) lesion on the MRI.       Current Outpatient Medications:   •  albuterol sulfate  (90 Base) MCG/ACT inhaler, Inhale 2 puffs Every 4 (Four) Hours As Needed for Wheezing., Disp: 6.7 g, Rfl: 0  •  amitriptyline (ELAVIL) 25 MG tablet, TAKE 1 TABLET EVERY NIGHT, Disp: 90 tablet, Rfl: 3  •  amLODIPine (NORVASC) 5 MG tablet, TAKE 1 TABLET DAILY, Disp: 90 tablet, Rfl: 3  •  aspirin 81 MG EC tablet, Take 81 mg by mouth Daily., Disp: , Rfl:   •  esomeprazole (nexIUM) 40 MG capsule, TAKE 1 CAPSULE DAILY, Disp: 90 capsule, Rfl: 11  •  ezetimibe (ZETIA) 10 MG tablet, TAKE 1 TABLET DAILY, Disp: 90 tablet, Rfl: 3  •  levocetirizine (XYZAL) 5 MG tablet, TAKE 1 TABLET BY MOUTH EVERY EVENING, Disp: 30 tablet, Rfl: 0  •  metoprolol tartrate (LOPRESSOR) 50 MG tablet, TAKE 1 TABLET TWICE A DAY, Disp: 180 tablet, Rfl: 3  •  sildenafil (VIAGRA) 100 MG tablet, TAKE 1 TABLET ONCE DAILY AS NEEDED FOR ERECTILE DYSFUNCTION., Disp: 20 tablet, Rfl: 0  Past Medical History:   Diagnosis Date   • Diverticulosis    • Erectile disorder due to 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    • GERD (gastroesophageal reflux disease)    • Hyperlipidemia    • Hypertension    • Insomnia    • Kidney stone    • Prostate cancer (HCC)      Past Surgical History:   Procedure Laterality Date   • CHOLECYSTECTOMY     • COLONOSCOPY  03/13/2006    left sided diverticulosis   • ENDOSCOPY  03/20/2006    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   Gastrointestinal: Negative for abdominal pain, anal bleeding or blood in stool.           Objective   PHYSICAL EXAM  Vital Signs:   There were no vitals taken for this visit.    Constitutional: Patient is without distress or deformity.  Vital signs are reviewed as above.    Neuro: No confusion; No disorientation; Alert and oriented  Pulmonary: No respiratory distress.   Skin: No pallor or diaphoresis      DATA  Result Review :              Results for orders placed or performed in visit on 05/27/22   PSA DIAGNOSTIC    Specimen: Blood   Result Value Ref Range    PSA 9.200 (H) 0.000 - 4.000 ng/mL     Lab Results   Component Value Date    PSA 9.200 (H) 05/31/2022    PSA 6.450 (H) 11/15/2021    PSA 4.670 (H) 05/06/2021            ASSESSMENT AND PLAN          Problem List Items Addressed This Visit        Hematology and Neoplasia    Prostate cancer (HCC) - Primary      We discussed the following:  -His PSA has essentially doubled over 1 year.  I told him its not unusual after biopsy to see the PSA go up but we would anticipate this leveling off.Will repeat again in six months.   -We discussed the MRI he had 6 months ago and now there is a lesion that we probably did not sample at his last biopsy since this 1 was new.  We talked about the option of repeating an MRI in about 6 months to see what it looks like along with a PSA.  Certainly if his PSA continues to increase I would strongly urged him to consider biopsy.  -We also talked about that at 76 years old there would probably be little improvement to his overall survival and certainly quality of life by diagnosing and potentially treating his prostate cancer.      I spent 30 minutes caring for Nehemiah on this date of service. This time includes time spent by me in the following activities:preparing for the visit, reviewing  tests, performing a medically appropriate examination and/or evaluation , counseling and educating the patient/family/caregiver and documenting information in the medical record  FOLLOW UP   {Instructions Charge Capture  Follow-up Communications :23}  No follow-ups on file.        (Please note that portions of this note were completed with a voice recognition program.)  Watson Sheehan MD  06/15/22  15:20 CDT

## 2022-06-16 ENCOUNTER — OFFICE VISIT (OUTPATIENT)
Dept: UROLOGY | Facility: CLINIC | Age: 77
End: 2022-06-16

## 2022-06-16 VITALS — TEMPERATURE: 98 F | BODY MASS INDEX: 29.29 KG/M2 | HEIGHT: 67 IN | WEIGHT: 186.6 LBS

## 2022-06-16 DIAGNOSIS — C61 PROSTATE CANCER: Primary | ICD-10-CM

## 2022-06-16 LAB
BILIRUB BLD-MCNC: ABNORMAL MG/DL
CLARITY, POC: CLEAR
COLOR UR: YELLOW
GLUCOSE UR STRIP-MCNC: NEGATIVE MG/DL
KETONES UR QL: ABNORMAL
LEUKOCYTE EST, POC: NEGATIVE
NITRITE UR-MCNC: NEGATIVE MG/ML
PH UR: 5.5 [PH] (ref 5–8)
PROT UR STRIP-MCNC: ABNORMAL MG/DL
RBC # UR STRIP: NEGATIVE /UL
SP GR UR: 1.02 (ref 1–1.03)
UROBILINOGEN UR QL: ABNORMAL

## 2022-06-16 PROCEDURE — 99214 OFFICE O/P EST MOD 30 MIN: CPT | Performed by: UROLOGY

## 2022-06-16 PROCEDURE — 81003 URINALYSIS AUTO W/O SCOPE: CPT | Performed by: UROLOGY

## 2022-08-15 RX ORDER — SILDENAFIL 100 MG/1
TABLET, FILM COATED ORAL
Qty: 20 TABLET | Refills: 0 | Status: SHIPPED | OUTPATIENT
Start: 2022-08-15 | End: 2022-12-13

## 2022-08-23 DIAGNOSIS — E78.2 MIXED HYPERLIPIDEMIA: ICD-10-CM

## 2022-08-23 DIAGNOSIS — I10 PRIMARY HYPERTENSION: Primary | ICD-10-CM

## 2022-08-24 LAB
ALBUMIN SERPL-MCNC: 4.1 G/DL (ref 3.5–5.2)
ALBUMIN/GLOB SERPL: 1.8 G/DL
ALP SERPL-CCNC: 90 U/L (ref 39–117)
ALT SERPL-CCNC: 12 U/L (ref 1–41)
AST SERPL-CCNC: 21 U/L (ref 1–40)
BASOPHILS # BLD AUTO: 0.03 10*3/MM3 (ref 0–0.2)
BASOPHILS NFR BLD AUTO: 0.5 % (ref 0–1.5)
BILIRUB SERPL-MCNC: 0.5 MG/DL (ref 0–1.2)
BUN SERPL-MCNC: 20 MG/DL (ref 8–23)
BUN/CREAT SERPL: 20 (ref 7–25)
CALCIUM SERPL-MCNC: 8.9 MG/DL (ref 8.6–10.5)
CHLORIDE SERPL-SCNC: 102 MMOL/L (ref 98–107)
CHOLEST SERPL-MCNC: 201 MG/DL (ref 0–200)
CO2 SERPL-SCNC: 27.1 MMOL/L (ref 22–29)
CREAT SERPL-MCNC: 1 MG/DL (ref 0.76–1.27)
EGFRCR-CYS SERPLBLD CKD-EPI 2021: 78.5 ML/MIN/1.73
EOSINOPHIL # BLD AUTO: 0.14 10*3/MM3 (ref 0–0.4)
EOSINOPHIL NFR BLD AUTO: 2.5 % (ref 0.3–6.2)
ERYTHROCYTE [DISTWIDTH] IN BLOOD BY AUTOMATED COUNT: 13.1 % (ref 12.3–15.4)
GLOBULIN SER CALC-MCNC: 2.3 GM/DL
GLUCOSE SERPL-MCNC: 101 MG/DL (ref 65–99)
HCT VFR BLD AUTO: 47 % (ref 37.5–51)
HDLC SERPL-MCNC: 43 MG/DL (ref 40–60)
HGB BLD-MCNC: 16 G/DL (ref 13–17.7)
IMM GRANULOCYTES # BLD AUTO: 0.01 10*3/MM3 (ref 0–0.05)
IMM GRANULOCYTES NFR BLD AUTO: 0.2 % (ref 0–0.5)
LDLC SERPL CALC-MCNC: 137 MG/DL (ref 0–100)
LYMPHOCYTES # BLD AUTO: 1.46 10*3/MM3 (ref 0.7–3.1)
LYMPHOCYTES NFR BLD AUTO: 26.4 % (ref 19.6–45.3)
MCH RBC QN AUTO: 30.6 PG (ref 26.6–33)
MCHC RBC AUTO-ENTMCNC: 34 G/DL (ref 31.5–35.7)
MCV RBC AUTO: 89.9 FL (ref 79–97)
MONOCYTES # BLD AUTO: 0.58 10*3/MM3 (ref 0.1–0.9)
MONOCYTES NFR BLD AUTO: 10.5 % (ref 5–12)
NEUTROPHILS # BLD AUTO: 3.32 10*3/MM3 (ref 1.7–7)
NEUTROPHILS NFR BLD AUTO: 59.9 % (ref 42.7–76)
NRBC BLD AUTO-RTO: 0.2 /100 WBC (ref 0–0.2)
PLATELET # BLD AUTO: 192 10*3/MM3 (ref 140–450)
POTASSIUM SERPL-SCNC: 4.1 MMOL/L (ref 3.5–5.2)
PROT SERPL-MCNC: 6.4 G/DL (ref 6–8.5)
RBC # BLD AUTO: 5.23 10*6/MM3 (ref 4.14–5.8)
SODIUM SERPL-SCNC: 143 MMOL/L (ref 136–145)
TRIGL SERPL-MCNC: 116 MG/DL (ref 0–150)
TSH SERPL DL<=0.005 MIU/L-ACNC: 1.21 UIU/ML (ref 0.27–4.2)
VLDLC SERPL CALC-MCNC: 21 MG/DL (ref 5–40)
WBC # BLD AUTO: 5.54 10*3/MM3 (ref 3.4–10.8)

## 2022-08-25 RX ORDER — ATORVASTATIN CALCIUM 40 MG/1
40 TABLET, FILM COATED ORAL NIGHTLY
Qty: 90 TABLET | Refills: 1 | Status: SHIPPED | OUTPATIENT
Start: 2022-08-25 | End: 2022-12-28 | Stop reason: SDUPTHER

## 2022-08-26 ENCOUNTER — OFFICE VISIT (OUTPATIENT)
Dept: FAMILY MEDICINE CLINIC | Facility: CLINIC | Age: 77
End: 2022-08-26

## 2022-08-26 VITALS
HEART RATE: 44 BPM | WEIGHT: 191 LBS | BODY MASS INDEX: 29.98 KG/M2 | HEIGHT: 67 IN | DIASTOLIC BLOOD PRESSURE: 72 MMHG | OXYGEN SATURATION: 97 % | RESPIRATION RATE: 14 BRPM | SYSTOLIC BLOOD PRESSURE: 124 MMHG

## 2022-08-26 DIAGNOSIS — I10 PRIMARY HYPERTENSION: ICD-10-CM

## 2022-08-26 DIAGNOSIS — E78.2 MIXED HYPERLIPIDEMIA: Primary | ICD-10-CM

## 2022-08-26 PROCEDURE — 1170F FXNL STATUS ASSESSED: CPT | Performed by: FAMILY MEDICINE

## 2022-08-26 PROCEDURE — 1160F RVW MEDS BY RX/DR IN RCRD: CPT | Performed by: FAMILY MEDICINE

## 2022-08-26 PROCEDURE — G0439 PPPS, SUBSEQ VISIT: HCPCS | Performed by: FAMILY MEDICINE

## 2022-08-26 NOTE — PROGRESS NOTES
Subjective   Nehemiah Gomez is a 76 y.o. male.     Chief Complaint   Patient presents with   • Hypertension     6 mo f/u   • Medicare Wellness-subsequent        History of Present Illness     he thinks he is doing well --bp is controlled withot cp or ha      Current Outpatient Medications:   •  albuterol sulfate  (90 Base) MCG/ACT inhaler, Inhale 2 puffs Every 4 (Four) Hours As Needed for Wheezing., Disp: 6.7 g, Rfl: 0  •  amitriptyline (ELAVIL) 25 MG tablet, TAKE 1 TABLET EVERY NIGHT, Disp: 90 tablet, Rfl: 3  •  amLODIPine (NORVASC) 5 MG tablet, TAKE 1 TABLET DAILY, Disp: 90 tablet, Rfl: 3  •  aspirin 81 MG EC tablet, Take 81 mg by mouth Daily., Disp: , Rfl:   •  atorvastatin (Lipitor) 40 MG tablet, Take 1 tablet by mouth Every Night., Disp: 90 tablet, Rfl: 1  •  esomeprazole (nexIUM) 40 MG capsule, TAKE 1 CAPSULE DAILY, Disp: 90 capsule, Rfl: 11  •  ezetimibe (ZETIA) 10 MG tablet, TAKE 1 TABLET DAILY, Disp: 90 tablet, Rfl: 3  •  levocetirizine (XYZAL) 5 MG tablet, TAKE 1 TABLET BY MOUTH EVERY EVENING, Disp: 30 tablet, Rfl: 0  •  metoprolol tartrate (LOPRESSOR) 50 MG tablet, TAKE 1 TABLET TWICE A DAY, Disp: 180 tablet, Rfl: 3  •  sildenafil (VIAGRA) 100 MG tablet, TAKE 1 TABLET ONCE DAILY AS NEEDED FOR ERECTILE DYSFUNCTION., Disp: 20 tablet, Rfl: 0  No Known Allergies    BMI is >= 25 and <30. 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    HENT: Negative.    Eyes: Negative.    Respiratory: Negative.    Cardiovascular: Negative.    Gastrointestinal: Negative.    Endocrine: Negative.    Genitourinary: Negative.    Musculoskeletal: Negative.    Skin: Negative.    Allergic/Immunologic: Negative.    Neurological: Negative.    Hematological: Negative.    Psychiatric/Behavioral: Negative.        Objective  /72   Pulse (!) 44   Resp 14   Ht 170.2 cm (67\")   Wt 86.6 kg (191 lb)   SpO2 97%   BMI 29.91 kg/m²   Physical Exam  Vitals reviewed.   Constitutional:       Appearance: Normal appearance. He is normal weight.   HENT:      Head: Normocephalic and atraumatic.      Nose: Nose normal.      Mouth/Throat:      Mouth: Mucous membranes are moist.   Eyes:      Pupils: Pupils are equal, round, and reactive to light.   Cardiovascular:      Rate and Rhythm: Normal rate and regular rhythm.      Pulses: Normal pulses.      Heart sounds: Normal heart sounds.   Pulmonary:      Effort: Pulmonary effort is normal.      Breath sounds: Normal breath sounds.   Abdominal:      General: Abdomen is flat. Bowel sounds are normal.      Palpations: Abdomen is soft.   Musculoskeletal:      Cervical back: Normal range of motion and neck supple.   Skin:     General: Skin is warm and dry.      Capillary Refill: Capillary refill takes less than 2 seconds.   Neurological:      General: No focal deficit present.      Mental Status: He is alert and oriented to person, place, and time. Mental status is at baseline.   Psychiatric:         Mood and Affect: Mood normal.         Assessment & Plan   Diagnoses and all orders for this visit:    1. Mixed hyperlipidemia (Primary)    2. Primary hypertension      We note his recent labs         No orders of the defined types were placed in this " encounter.      Follow up: 6 month(s)

## 2022-08-26 NOTE — PROGRESS NOTES
The ABCs of the Annual Wellness Visit  Subsequent Medicare Wellness Visit    Chief Complaint   Patient presents with   • Hypertension     6 mo f/u   • Medicare Wellness-subsequent      Subjective    History of Present Illness:  Nehemiah Gomez is a 76 y.o. male who presents for a Subsequent Medicare Wellness Visit.    The following portions of the patient's history were reviewed and   updated as appropriate: allergies, current medications, past family history, past medical history, past social history, past surgical history and problem list.    Compared to one year ago, the patient feels his physical   health is the same.    Compared to one year ago, the patient feels his mental   health is the same.    Recent Hospitalizations:  He was not admitted to the hospital during the last year.       Current Medical Providers:  Patient Care Team:  Erwin Ruelas MD as PCP - General  Davion Moreno MD as Consulting Physician (Urology)  Watson Sheehan MD as Consulting Physician (Urology)  Hernan Escamilla DO as Consulting Physician (Gastroenterology)    Outpatient Medications Prior to Visit   Medication Sig Dispense Refill   • albuterol sulfate  (90 Base) MCG/ACT inhaler Inhale 2 puffs Every 4 (Four) Hours As Needed for Wheezing. 6.7 g 0   • amitriptyline (ELAVIL) 25 MG tablet TAKE 1 TABLET EVERY NIGHT 90 tablet 3   • amLODIPine (NORVASC) 5 MG tablet TAKE 1 TABLET DAILY 90 tablet 3   • aspirin 81 MG EC tablet Take 81 mg by mouth Daily.     • atorvastatin (Lipitor) 40 MG tablet Take 1 tablet by mouth Every Night. 90 tablet 1   • esomeprazole (nexIUM) 40 MG capsule TAKE 1 CAPSULE DAILY 90 capsule 11   • ezetimibe (ZETIA) 10 MG tablet TAKE 1 TABLET DAILY 90 tablet 3   • levocetirizine (XYZAL) 5 MG tablet TAKE 1 TABLET BY MOUTH EVERY EVENING 30 tablet 0   • metoprolol tartrate (LOPRESSOR) 50 MG tablet TAKE 1 TABLET TWICE A  tablet 3   • sildenafil (VIAGRA) 100 MG tablet TAKE 1 TABLET ONCE DAILY AS  "NEEDED FOR ERECTILE DYSFUNCTION. 20 tablet 0     No facility-administered medications prior to visit.       No opioid medication identified on active medication list. I have reviewed chart for other potential  high risk medication/s and harmful drug interactions in the elderly.          Aspirin is on active medication list. Aspirin use is indicated based on review of current medical condition/s. Pros and cons of this therapy have been discussed today. Benefits of this medication outweigh potential harm.  Patient has been encouraged to continue taking this medication.  .      Patient Active Problem List   Diagnosis   • Hypertension   • GERD without esophagitis   • Hyperlipidemia   • Erectile dysfunction   • Elevated prostate specific antigen (PSA)   • BPH (benign prostatic hypertrophy) with urinary obstruction   • Cough   • Bronchitis   • Primary insomnia   • Benign prostatic hyperplasia with lower urinary tract symptoms   • Prostate cancer (HCC)   • Scabies     Advance Care Planning  Advance Directive is not on file.  ACP discussion was held with the patient during this visit. Patient does not have an advance directive, information provided.          Objective    Vitals:    08/26/22 0952   BP: 124/72   Pulse: (!) 44   Resp: 14   SpO2: 97%   Weight: 86.6 kg (191 lb)   Height: 170.2 cm (67\")     Estimated body mass index is 29.91 kg/m² as calculated from the following:    Height as of this encounter: 170.2 cm (67\").    Weight as of this encounter: 86.6 kg (191 lb).    BMI is >= 25 and <30. (Overweight) The following options were offered after discussion;: nutrition counseling/recommendations      Does the patient have evidence of cognitive impairment? No    Physical Exam  Lab Results   Component Value Date    CHLPL 201 (H) 08/23/2022    TRIG 116 08/23/2022    HDL 43 08/23/2022     (H) 08/23/2022    VLDL 21 08/23/2022            HEALTH RISK ASSESSMENT    Smoking Status:  Social History     Tobacco Use   Smoking " Status Former Smoker   • Years: 20.00   • Types: Cigarettes   • Quit date:    • Years since quittin.6   Smokeless Tobacco Never Used     Alcohol Consumption:  Social History     Substance and Sexual Activity   Alcohol Use Yes    Comment: very little     Fall Risk Screen:    KIKI Fall Risk Assessment was completed, and patient is at LOW risk for falls.Assessment completed on:2022    Depression Screening:  PHQ-2/PHQ-9 Depression Screening 2022   Retired PHQ-9 Total Score -   Retired Total Score -   Little Interest or Pleasure in Doing Things 0-->not at all   Feeling Down, Depressed or Hopeless 0-->not at all   PHQ-9: Brief Depression Severity Measure Score 0       Health Habits and Functional and Cognitive Screening:  Functional & Cognitive Status 2022   Do you have difficulty preparing food and eating? No   Do you have difficulty bathing yourself, getting dressed or grooming yourself? No   Do you have difficulty using the toilet? No   Do you have difficulty moving around from place to place? No   Do you have trouble with steps or getting out of a bed or a chair? No   Current Diet Well Balanced Diet   Dental Exam Up to date   Eye Exam Up to date   Exercise (times per week) 5 times per week   Current Exercises Include Walking   Current Exercise Activities Include -   Do you need help using the phone?  No   Are you deaf or do you have serious difficulty hearing?  No   Do you need help with transportation? No   Do you need help shopping? No   Do you need help preparing meals?  No   Do you need help with housework?  No   Do you need help with laundry? No   Do you need help taking your medications? No   Do you need help managing money? No   Do you ever drive or ride in a car without wearing a seat belt? No   Have you felt unusual stress, anger or loneliness in the last month? No   Who do you live with? Spouse   If you need help, do you have trouble finding someone available to you? No   Have you  been bothered in the last four weeks by sexual problems? No   Do you have difficulty concentrating, remembering or making decisions? No       Age-appropriate Screening Schedule:  Refer to the list below for future screening recommendations based on patient's age, sex and/or medical conditions. Orders for these recommended tests are listed in the plan section. The patient has been provided with a written plan.    Health Maintenance   Topic Date Due   • TDAP/TD VACCINES (1 - Tdap) Never done   • ZOSTER VACCINE (2 of 2) 11/26/2016   • INFLUENZA VACCINE  10/01/2022   • LIPID PANEL  08/23/2023              Assessment & Plan   CMS Preventative Services Quick Reference  Risk Factors Identified During Encounter  Cardiovascular Disease  The above risks/problems have been discussed with the patient.  Follow up actions/plans if indicated are seen below in the Assessment/Plan Section.  Pertinent information has been shared with the patient in the After Visit Summary.    Diagnoses and all orders for this visit:    1. Mixed hyperlipidemia (Primary)    2. Primary hypertension        Follow Up:   No follow-ups on file.     An After Visit Summary and PPPS were made available to the patient.          I spent 5  minutes caring for Nehemiah on this date of service. This time includes time spent by me in the following activities:reviewing tests, obtaining and/or reviewing a separately obtained history, counseling and educating the patient/family/caregiver, referring and communicating with other health care professionals  and independently interpreting results and communicating that information with the patient/family/caregiver

## 2022-10-06 ENCOUNTER — FLU SHOT (OUTPATIENT)
Dept: FAMILY MEDICINE CLINIC | Facility: CLINIC | Age: 77
End: 2022-10-06

## 2022-10-06 DIAGNOSIS — Z23 NEED FOR INFLUENZA VACCINATION: Primary | ICD-10-CM

## 2022-10-06 PROCEDURE — 90662 IIV NO PRSV INCREASED AG IM: CPT | Performed by: FAMILY MEDICINE

## 2022-10-06 PROCEDURE — G0008 ADMIN INFLUENZA VIRUS VAC: HCPCS | Performed by: FAMILY MEDICINE

## 2022-12-06 ENCOUNTER — LAB (OUTPATIENT)
Dept: UROLOGY | Facility: CLINIC | Age: 77
End: 2022-12-06

## 2022-12-06 DIAGNOSIS — C61 PROSTATE CANCER: Primary | ICD-10-CM

## 2022-12-06 DIAGNOSIS — C61 PROSTATE CANCER: ICD-10-CM

## 2022-12-07 LAB — PSA SERPL-MCNC: 5.3 NG/ML (ref 0–4)

## 2022-12-12 NOTE — PROGRESS NOTES
Chief Complaint  F/u prostate cancer    Subjective          Nehemiah Gomez presents to [facility] [place]   Patient with low risk adenocarcinoma prostate.Patient denies any possible systemic symptoms of prostate cancer such as weight loss, lower extremity edema, or skeletal pain that could be worrisome for systemic disease.    Urologic history  -06/19/2020; multiparmetric MRI prostate: PI-RADS 4 lesion in right lateral apex peripheral zone.  -07/01/2020: [employer] TRUS biopsy prostate: Aldrich grade 3+3 = 6 in 15% of targeted lesion  111/27/2021: multiparametric MRI prostate: PI-RADS 4 lesion in right lateral apex peripheral zone (previously biopsied) and PI-RADS4 lesion in the left mid anterior transition zone >1cm.       Current Outpatient Medications:   •  amitriptyline (ELAVIL) 25 MG tablet, TAKE 1 TABLET EVERY NIGHT, Disp: 90 tablet, Rfl: 3  •  amLODIPine (NORVASC) 5 MG tablet, TAKE 1 TABLET DAILY, Disp: 90 tablet, Rfl: 3  •  aspirin 81 MG EC tablet, Take 81 mg by mouth Daily., Disp: , Rfl:   •  atorvastatin (Lipitor) 40 MG tablet, Take 1 tablet by mouth Every Night., Disp: 90 tablet, Rfl: 1  •  esomeprazole (nexIUM) 40 MG capsule, TAKE 1 CAPSULE DAILY, Disp: 90 capsule, Rfl: 11  •  metoprolol tartrate (LOPRESSOR) 50 MG tablet, TAKE 1 TABLET TWICE A DAY, Disp: 180 tablet, Rfl: 3  Past Medical History:   Diagnosis Date   • Diverticulosis    • Erectile disorder due to 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    • GERD (gastroesophageal reflux disease)    • Hyperlipidemia    • Hypertension    • Insomnia    • Kidney stone    • Prostate cancer (HCC)      Past Surgical History:   Procedure Laterality Date   • CHOLECYSTECTOMY     • COLONOSCOPY  03/13/2006    left sided diverticulosis   • ENDOSCOPY  03/20/2006    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;s distal esophagus   • PROSTATE BIOPSY N/A 06/29/2020    Procedure: PROSTATE ULTRASOUND URONAV FUSION BIOPSY.;  Surgeon: Watson Sheehan MD;  Location: [facility] OR;   "Service: Urology;  Laterality: N/A;           Review of Systems      Objective   PHYSICAL EXAM  Vital Signs:   Temp 97 °F (36.1 °C)   Ht 170.2 cm (67\")   Wt 88.1 kg (194 lb 3.2 oz)   BMI 30.42 kg/m²     Physical Exam      DATA  Result Review :              Results for orders placed or performed in visit on 12/13/22   POC Urinalysis Dipstick, Multipro    Specimen: Urine   Result Value Ref Range    Color Yellow Yellow, Straw, Dark Yellow, Thu    Clarity, UA Clear Clear    Glucose, UA Negative Negative mg/dL    Bilirubin Negative Negative    Ketones, UA Negative Negative    Specific Gravity  1.005 1.005 - 1.030    Blood, UA Negative Negative    pH, Urine 6.0 5.0 - 8.0    Protein, POC Negative Negative mg/dL    Urobilinogen, UA Normal Normal, 0.2 E.U./dL    Nitrite, UA Negative Negative    Leukocytes Negative Negative       Lab Results   Component Value Date    PSA 5.300 (H) 12/06/2022    PSA 9.200 (H) 05/31/2022    PSA 6.450 (H) 11/15/2021       PSA 4.670 (H) 05/06/2021     PSA 5.860 (H) 06/19/2020     PSA 5.200 (H) 12/10/2019                ASSESSMENT AND PLAN          Problem List Items Addressed This Visit        Hematology and Neoplasia    Prostate cancer (HCC) - Primary   -Patient continues to do well with no evidence of progression of disease.  His PSA returned back to normal (for him).  I will recheck his PSA in 6 months and we will repeat an MRI at the end of next year        FOLLOW UP     Return in about 6 months (around 6/13/2023) for PSA before visit; MIKEY at next visit.        (Please note that portions of this note were completed with a voice recognition program.)  Watson Sheehan MD  12/13/22  10:47 CST  "

## 2022-12-13 ENCOUNTER — OFFICE VISIT (OUTPATIENT)
Dept: UROLOGY | Facility: CLINIC | Age: 77
End: 2022-12-13

## 2022-12-13 VITALS — TEMPERATURE: 97 F | WEIGHT: 194.2 LBS | BODY MASS INDEX: 30.48 KG/M2 | HEIGHT: 67 IN

## 2022-12-13 DIAGNOSIS — C61 PROSTATE CANCER: Primary | ICD-10-CM

## 2022-12-13 LAB
BILIRUB BLD-MCNC: NEGATIVE MG/DL
CLARITY, POC: CLEAR
COLOR UR: YELLOW
GLUCOSE UR STRIP-MCNC: NEGATIVE MG/DL
KETONES UR QL: NEGATIVE
LEUKOCYTE EST, POC: NEGATIVE
NITRITE UR-MCNC: NEGATIVE MG/ML
PH UR: 6 [PH] (ref 5–8)
PROT UR STRIP-MCNC: NEGATIVE MG/DL
RBC # UR STRIP: NEGATIVE /UL
SP GR UR: 1 (ref 1–1.03)
UROBILINOGEN UR QL: NORMAL

## 2022-12-13 PROCEDURE — 99213 OFFICE O/P EST LOW 20 MIN: CPT | Performed by: UROLOGY

## 2022-12-13 PROCEDURE — 81003 URINALYSIS AUTO W/O SCOPE: CPT | Performed by: UROLOGY

## 2022-12-28 RX ORDER — ESOMEPRAZOLE MAGNESIUM 40 MG/1
40 CAPSULE, DELAYED RELEASE ORAL DAILY
Qty: 90 CAPSULE | Refills: 1 | Status: SHIPPED | OUTPATIENT
Start: 2022-12-28 | End: 2023-01-03 | Stop reason: SDUPTHER

## 2022-12-28 RX ORDER — ATORVASTATIN CALCIUM 40 MG/1
40 TABLET, FILM COATED ORAL NIGHTLY
Qty: 90 TABLET | Refills: 1 | Status: SHIPPED | OUTPATIENT
Start: 2022-12-28 | End: 2023-01-30

## 2023-01-03 RX ORDER — ESOMEPRAZOLE MAGNESIUM 40 MG/1
40 CAPSULE, DELAYED RELEASE ORAL DAILY
Qty: 90 CAPSULE | Refills: 1 | Status: SHIPPED | OUTPATIENT
Start: 2023-01-03 | End: 2023-01-19 | Stop reason: SDUPTHER

## 2023-01-03 NOTE — TELEPHONE ENCOUNTER
Caller: Nehemiah Gomez    Relationship: Self    Best call back number: [phone]    Requested Prescriptions:   Requested Prescriptions     Pending Prescriptions Disp Refills   • esomeprazole (nexIUM) 40 MG capsule 90 capsule 1     Sig: Take 1 capsule by mouth Daily.        Pharmacy where request should be sent: [facility] - [facility] [id number] [phone] [facility] [phone] FX     Additional details provided by patient: PATIENT HAS LESS THAN A 3 DAY SUPPLY LEFT.     Does the patient have less than a 3 day supply:  [x] Yes  [] No    Would you like a call back once the refill request has been completed: [x] Yes [] No    If the office needs to give you a call back, can they leave a voicemail: [x] Yes [] No    Immanuel Serrato Rep   01/03/23 10:34 CST

## 2023-01-19 RX ORDER — ESOMEPRAZOLE MAGNESIUM 40 MG/1
40 CAPSULE, DELAYED RELEASE ORAL DAILY
Qty: 90 CAPSULE | Refills: 1 | Status: SHIPPED | OUTPATIENT
Start: 2023-01-19

## 2023-01-30 RX ORDER — ATORVASTATIN CALCIUM 40 MG/1
TABLET, FILM COATED ORAL
Qty: 90 TABLET | Refills: 3 | Status: SHIPPED | OUTPATIENT
Start: 2023-01-30 | End: 2023-02-28 | Stop reason: SDUPTHER

## 2023-02-28 ENCOUNTER — OFFICE VISIT (OUTPATIENT)
Dept: FAMILY MEDICINE CLINIC | Facility: CLINIC | Age: 78
End: 2023-02-28
Payer: MEDICARE

## 2023-02-28 VITALS
HEART RATE: 66 BPM | HEIGHT: 67 IN | RESPIRATION RATE: 16 BRPM | TEMPERATURE: 98.8 F | BODY MASS INDEX: 30.45 KG/M2 | DIASTOLIC BLOOD PRESSURE: 74 MMHG | SYSTOLIC BLOOD PRESSURE: 132 MMHG | OXYGEN SATURATION: 97 % | WEIGHT: 194 LBS

## 2023-02-28 DIAGNOSIS — K21.9 GERD WITHOUT ESOPHAGITIS: ICD-10-CM

## 2023-02-28 DIAGNOSIS — I10 PRIMARY HYPERTENSION: Primary | ICD-10-CM

## 2023-02-28 DIAGNOSIS — E78.2 MIXED HYPERLIPIDEMIA: ICD-10-CM

## 2023-02-28 PROCEDURE — 99213 OFFICE O/P EST LOW 20 MIN: CPT | Performed by: FAMILY MEDICINE

## 2023-02-28 RX ORDER — METOPROLOL TARTRATE 50 MG/1
50 TABLET, FILM COATED ORAL 2 TIMES DAILY
Qty: 180 TABLET | Refills: 3 | Status: SHIPPED | OUTPATIENT
Start: 2023-02-28

## 2023-02-28 RX ORDER — AMLODIPINE BESYLATE 5 MG/1
5 TABLET ORAL DAILY
Qty: 90 TABLET | Refills: 3 | Status: SHIPPED | OUTPATIENT
Start: 2023-02-28

## 2023-02-28 RX ORDER — ATORVASTATIN CALCIUM 40 MG/1
40 TABLET, FILM COATED ORAL NIGHTLY
Qty: 90 TABLET | Refills: 3 | Status: SHIPPED | OUTPATIENT
Start: 2023-02-28

## 2023-02-28 NOTE — PROGRESS NOTES
Subjective   Nehemiah Gomez is a 77 y.o. male.     Chief Complaint   Patient presents with   • Hypertension   • Hyperlipidemia     History of Present Illness     he noes good bp control without cp or ha--sheis toleriang statin clementutout nagys       Current Outpatient Medications:   •  amLODIPine (NORVASC) 5 MG tablet, Take 1 tablet by mouth Daily., Disp: 90 tablet, Rfl: 3  •  aspirin 81 MG EC tablet, Take 1 tablet by mouth Daily., Disp: , Rfl:   •  atorvastatin (LIPITOR) 40 MG tablet, Take 1 tablet by mouth Every Night., Disp: 90 tablet, Rfl: 3  •  esomeprazole (nexIUM) 40 MG capsule, Take 1 capsule by mouth Daily., Disp: 90 capsule, Rfl: 1  •  metoprolol tartrate (LOPRESSOR) 50 MG tablet, Take 1 tablet by mouth 2 (Two) Times a Day., Disp: 180 tablet, Rfl: 3  •  amitriptyline (ELAVIL) 25 MG tablet, TAKE 1 TABLET EVERY NIGHT, Disp: 90 tablet, Rfl: 3  No Known Allergies    BMI is >= 30 and <35. (Class 1 Obesity). 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    HENT: Negative.    Eyes: Negative.    Respiratory: Negative.    Cardiovascular: Negative.    Gastrointestinal: Negative.    Endocrine: Negative.    Genitourinary: Negative.    Musculoskeletal: Negative.    Skin: Negative.   "  Allergic/Immunologic: Negative.    Neurological: Negative.    Hematological: Negative.    Psychiatric/Behavioral: Negative.        Objective  /74   Pulse 66   Temp 98.8 °F (37.1 °C)   Resp 16   Ht 170.2 cm (67.01\")   Wt 88 kg (194 lb)   SpO2 97%   BMI 30.38 kg/m²   Physical Exam  Vitals and nursing note reviewed.   Constitutional:       Appearance: Normal appearance. He is normal weight.   HENT:      Head: Normocephalic and atraumatic.      Nose: Nose normal.      Mouth/Throat:      Mouth: Mucous membranes are moist.   Eyes:      Pupils: Pupils are equal, round, and reactive to light.   Cardiovascular:      Rate and Rhythm: Normal rate and regular rhythm.      Pulses: Normal pulses.      Heart sounds: Normal heart sounds.   Pulmonary:      Effort: Pulmonary effort is normal.      Breath sounds: Normal breath sounds.   Abdominal:      General: Abdomen is flat. Bowel sounds are normal.      Palpations: Abdomen is soft.   Musculoskeletal:      Cervical back: Normal range of motion and neck supple.   Skin:     General: Skin is warm and dry.      Capillary Refill: Capillary refill takes less than 2 seconds.   Neurological:      General: No focal deficit present.      Mental Status: He is alert.   Psychiatric:         Mood and Affect: Mood normal.         Assessment & Plan   Diagnoses and all orders for this visit:    1. Primary hypertension (Primary)    2. Mixed hyperlipidemia    3. GERD without esophagitis    Other orders  -     amLODIPine (NORVASC) 5 MG tablet; Take 1 tablet by mouth Daily.  Dispense: 90 tablet; Refill: 3  -     atorvastatin (LIPITOR) 40 MG tablet; Take 1 tablet by mouth Every Night.  Dispense: 90 tablet; Refill: 3  -     metoprolol tartrate (LOPRESSOR) 50 MG tablet; Take 1 tablet by mouth 2 (Two) Times a Day.  Dispense: 180 tablet; Refill: 3    we reviewed recentl labs             No orders of the defined types were placed in this encounter.      Follow up: 6 month(s)  "

## 2023-03-02 RX ORDER — SILDENAFIL 100 MG/1
TABLET, FILM COATED ORAL
Qty: 20 TABLET | Refills: 0 | Status: SHIPPED | OUTPATIENT
Start: 2023-03-02

## 2023-03-09 RX ORDER — AMITRIPTYLINE HYDROCHLORIDE 25 MG/1
TABLET, FILM COATED ORAL
Qty: 90 TABLET | Refills: 3 | Status: SHIPPED | OUTPATIENT
Start: 2023-03-09 | End: 2023-03-29 | Stop reason: SDUPTHER

## 2023-03-29 RX ORDER — AMITRIPTYLINE HYDROCHLORIDE 25 MG/1
25 TABLET, FILM COATED ORAL NIGHTLY
Qty: 90 TABLET | Refills: 3 | Status: SHIPPED | OUTPATIENT
Start: 2023-03-29

## 2023-03-29 NOTE — TELEPHONE ENCOUNTER
Caller: GomezNehemiah    Relationship: Self    Best call back number: [phone]    Requested Prescriptions:   Requested Prescriptions     Pending Prescriptions Disp Refills   • amitriptyline (ELAVIL) 25 MG tablet 90 tablet 3     Sig: Take 1 tablet by mouth Every Night.        Pharmacy where request should be sent: [id number] - [address] AT [facility] [address] [facility] [phone] [facility] [phone] FX     Last office visit with prescribing clinician: 2/28/2023   Last telemedicine visit with prescribing clinician: 8/28/2023   Next office visit with prescribing clinician: 8/28/2023     Does the patient have less than a 3 day supply:  [x] Yes  [] No    Would you like a call back once the refill request has been completed: [x] Yes [] No    If the office needs to give you a call back, can they leave a voicemail: [x] Yes [] No    Immanuel Mora Rep   03/29/23 12:24 CDT

## 2023-06-05 NOTE — PROGRESS NOTES
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  He is 77 years old he does wish to continue active surveillance.  See symptom score below under data  for specific symptomatology, severity as well as bother.     PCa hx  -06/19/2020; multiparmetric MRI prostate: PI-RADS 4 lesion in right lateral apex peripheral zone.  -07/01/2020: Uro[employer] Fusion TRUS biopsy prostate: Pavel grade 3+3 = 6 in 15% of targeted lesion  11/27/2021: multiparametric MRI prostate: PI-RADS 4 lesion in right lateral apex peripheral zone (previously biopsied) and PI-RADS4 lesion in the left mid anterior transition zone >1cm.   Also follow    Dysfunction.  Libido is good.  He uses sildenafil 100 mg.      Current Outpatient Medications:     amitriptyline (ELAVIL) 25 MG tablet, Take 1 tablet by mouth Every Night., Disp: 90 tablet, Rfl: 3    amLODIPine (NORVASC) 5 MG tablet, Take 1 tablet by mouth Daily., Disp: 90 tablet, Rfl: 3    aspirin 81 MG EC tablet, Take 1 tablet by mouth Daily., Disp: , Rfl:     atorvastatin (LIPITOR) 40 MG tablet, Take 1 tablet by mouth Every Night., Disp: 90 tablet, Rfl: 3    esomeprazole (nexIUM) 40 MG capsule, Take 1 capsule by mouth Daily., Disp: 90 capsule, Rfl: 1    metoprolol tartrate (LOPRESSOR) 50 MG tablet, Take 1 tablet by mouth 2 (Two) Times a Day., Disp: 180 tablet, Rfl: 3    sildenafil (VIAGRA) 100 MG tablet, TAKE 1 TABLET ONCE DAILY AS NEEDED FOR ERECTILE DYSFUNCTION., Disp: 20 tablet, Rfl: 0  Past Medical History:   Diagnosis Date    Diverticulosis     Erectile disorder due to 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     GERD (gastroesophageal reflux disease)     Hyperlipidemia     Hypertension     Insomnia     Kidney stone     Prostate cancer      Past Surgical History:   Procedure Laterality Date    CHOLECYSTECTOMY      COLONOSCOPY  03/13/2006    left sided  "diverticulosis    ENDOSCOPY  03/20/2006    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 It is Symmetric, with a Soft consistency. There are no nodules present. . The seminal vesicles are Not palpable due to the size of the prostate.      DATA  Result Review :              Results for orders placed or performed in visit on 06/06/23   PSA Diagnostic    Specimen: Blood   Result Value Ref Range    PSA 5.560 (H) 0.000 - 4.000 ng/mL     Lab Results   Component Value Date    PSA 5.560 (H) 06/06/2023    PSA 5.300 (H) 12/06/2022    PSA 9.200 (H) 05/31/2022       PSA 6.450 (H) 11/15/2021     PSA 4.670 (H) 05/06/2021     PSA 5.860 (H) 06/19/2020                        ASSESSMENT AND PLAN          Problem List Items Addressed This Visit          Genitourinary and Reproductive     Erectile dysfunction       Hematology and Neoplasia    Prostate cancer - Primary    Relevant Orders    PSA DIAGNOSTIC    MRI Pelvis With & Without Contrast     Each of these chronic Urologic conditions, which I have followed >1 year,  were evaluated and managed today as follows:     Patient continues to do well on active surveillance.  He would like to continue this.  I recommended a PSA and MRI in 6 months    Erectile dysfunction is successfully managed with sildenafil at 100 mg on an as-needed basis.  We chose to continue this at current dosing.      FOLLOW UP     Return in about 6 months (around 12/13/2023) for PSA before visit, MRI pelvis before visit.        (Please note that portions of this note were completed with a voice recognition program.)  Watson LUA" MD Sissy  06/13/23  10:27 CDT

## 2023-06-13 ENCOUNTER — OFFICE VISIT (OUTPATIENT)
Dept: UROLOGY | Facility: CLINIC | Age: 78
End: 2023-06-13
Payer: MEDICARE

## 2023-06-13 VITALS — WEIGHT: 187.4 LBS | BODY MASS INDEX: 29.41 KG/M2 | TEMPERATURE: 96.2 F | HEIGHT: 67 IN

## 2023-06-13 DIAGNOSIS — C61 PROSTATE CANCER: Primary | ICD-10-CM

## 2023-06-13 DIAGNOSIS — N52.9 ERECTILE DYSFUNCTION, UNSPECIFIED ERECTILE DYSFUNCTION TYPE: ICD-10-CM

## 2023-06-13 PROCEDURE — 1159F MED LIST DOCD IN RCRD: CPT | Performed by: UROLOGY

## 2023-06-13 PROCEDURE — 99214 OFFICE O/P EST MOD 30 MIN: CPT | Performed by: UROLOGY

## 2023-06-13 PROCEDURE — 1160F RVW MEDS BY RX/DR IN RCRD: CPT | Performed by: UROLOGY

## 2023-06-29 ENCOUNTER — TELEPHONE (OUTPATIENT)
Dept: FAMILY MEDICINE CLINIC | Facility: CLINIC | Age: 78
End: 2023-06-29

## 2023-06-29 NOTE — TELEPHONE ENCOUNTER
I put in stat referral for cardiology--let him kmnow if any further spells to call 911 or go to the e r

## 2023-06-29 NOTE — TELEPHONE ENCOUNTER
Caller: Nehemiah Gomez    Relationship: Self    Best call back number:  [phone]    What specialty or service is being requested:     STRESS TEST, PATIENT HAS BEEN HAVING ISSUES WITH CHEST PAIN WITH EXCERSION     What is the provider, practice or medical service name:     What is the office location:     PREFER [facility]    What is the office phone number:     Any additional details:     NOT HAVING PAIN TODAY. YESTERDAY MOVED A MATTRESS AND HAD PAIN AND OUT OF BREATH. I ADVISED PATIENT THAT THE NEXT TIME THAT HAPPENS HE NEEDS TO GO TO THE ER.    PLEASE CALL AND ADVISE

## 2023-07-06 NOTE — H&P (VIEW-ONLY)
"Chief Complaint  Establish Care (Patient stated that he has been having some chest pain )    Subjective      Nehemiah Gomez presents to [facility] CARDIOLOGY  History of Present Illness    This 77-year-old male was referred by Erwin Ruelas MD for cardiac evaluation.  The patient has had 2 episodes of chest discomfort.  This past winter the patient was shoveling snow and had an episode described as tightness across his upper chest associated with feeling of dyspnea or not being able to catch his breath.  The discomfort abated after he quit shoveling.  It lasted for several minutes.  He did well until recently when he noted another episode when he was walking back to his house from his shed.  That walk is perhaps 100 feet uphill.  He had a pressure sensation in his upper chest and felt as though he could not catch his breath.  Again the discomfort dissipated when he rested.  He notified his primary care provider and cardiology opinion was requested.    Patient has had no PND or orthopnea.  There has been no palpitations, syncope or near syncope.  He has no peripheral edema.  He has a history of hyperlipidemia, hypertension and a strong family history of coronary artery disease.    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  He quit smoking in about 1984.  He is .  He has undergone cardiac work-ups in the past.  He had a cardiac catheterization in the 90s and thinks that I may have performed this test for him.  Our records do not go back that far.      Objective   Vital Signs:  /55   Pulse 53   Ht 170.2 cm (67\")   Wt 86.2 kg (190 lb)   BMI 29.76 kg/m²   Estimated body mass index is 29.76 kg/m² as calculated from the following:    Height as of this encounter: 170.2 cm (67\").    Weight as of this encounter: 86.2 kg (190 lb).            Physical Exam  This is a 77-year-old man in no apparent distress.  He is awake alert oriented x3.  HEENT is unremarkable.  There is no scleral " icterus.  The patient is normocephalic.  Neck reveals normal JVP, normal carotid upstroke, no carotid bruits and no thyromegaly.  Lungs are clear  Heart reveals normal apex impulse normal S1 and S2 with no audible murmurs or gallops sounds.  Abdomen is nontender without organomegaly or masses.  Extremities reveal no pretibial edema.  Fetal pulses are intact.  There are no focal neurologic abnormalities.  Cranial nerves II through XII appear to be intact.        Result Review :              ECG 12 Lead    Date/Time: 7/6/2023 4:24 PM  Performed by: Davion Garcia MD  Authorized by: Davion Garcia MD   Comparison: not compared with previous ECG   Rhythm: sinus bradycardia  Rate: bradycardic  Conduction: conduction normal  ST Segments: ST segments normal  T Waves: T waves normal  QRS axis: normal    Clinical impression: normal ECG          Assessment and Plan   Diagnoses and all orders for this visit:    1. Chronic chest pain with high risk for CAD (Primary)  -     Adult Stress Echo W/ Cont or Stress Agent if Necessary Per Protocol; Future  -     ECG 12 Lead    2. Primary hypertension  -     Adult Stress Echo W/ Cont or Stress Agent if Necessary Per Protocol; Future  -     ECG 12 Lead    3. Mixed hyperlipidemia  -     Adult Stress Echo W/ Cont or Stress Agent if Necessary Per Protocol; Future  -     ECG 12 Lead    Other orders  -     nitroglycerin (NITROSTAT) 0.4 MG SL tablet; 1 under the tongue as needed for angina, may repeat q5mins for up three doses  Dispense: 100 tablet; Refill: 11    The patient has had 2 episodes of chest discomfort occurring with activity.  The first episode occurred with strenuous activity but the more recent episode occurred after walking uphill for only about 100 feet.  The discomforts as described are compatible with angina.  A stress echocardiogram is ordered for cardiac risk assessment.  Nitroglycerin is prescribed for as needed use which I discussed thoroughly with  him.    The patient has a history of hypertension and his blood pressure is well controlled at today's visit.  We will continue metoprolol 50 mg daily, amlodipine 5 mg daily without change.    The patient takes atorvastatin 40 mg daily with LDL level in February of this year of 77.  No change in therapy at today's visit.    A return appointment is scheduled for 2 months.  I suspect this will be a post cardiac cath visit.  The patient is encouraged to contact us for any further concerns.    As always, I appreciate the opportunity to assist in the care of your patients.  Thank you for referring this pleasant gentleman.      There are no Patient Instructions on file for this visit.       Follow Up   Return in about 2 months (around 9/6/2023).  Patient was given instructions and counseling regarding his condition or for health maintenance advice. Please see specific information pulled into the AVS if appropriate.        show

## 2023-07-19 PROBLEM — I25.119 CORONARY ARTERY DISEASE INVOLVING NATIVE CORONARY ARTERY OF NATIVE HEART WITH ANGINA PECTORIS: Status: ACTIVE | Noted: 2023-07-19

## 2023-07-24 ENCOUNTER — HOSPITAL ENCOUNTER (OUTPATIENT)
Facility: HOSPITAL | Age: 78
Setting detail: HOSPITAL OUTPATIENT SURGERY
Discharge: HOME | End: 2023-07-24
Attending: INTERNAL MEDICINE | Admitting: INTERNAL MEDICINE
Payer: MEDICARE

## 2023-07-24 VITALS
BODY MASS INDEX: 28.94 KG/M2 | RESPIRATION RATE: 16 BRPM | WEIGHT: 184.4 LBS | HEART RATE: 47 BPM | TEMPERATURE: 96 F | DIASTOLIC BLOOD PRESSURE: 79 MMHG | SYSTOLIC BLOOD PRESSURE: 142 MMHG | HEIGHT: 67 IN | OXYGEN SATURATION: 99 %

## 2023-07-24 DIAGNOSIS — R94.39 ABNORMAL STRESS TEST: ICD-10-CM

## 2023-07-24 DIAGNOSIS — I25.10 LEFT MAIN CORONARY ARTERY DISEASE: Primary | ICD-10-CM

## 2023-07-24 DIAGNOSIS — I25.119 CORONARY ARTERY DISEASE INVOLVING NATIVE CORONARY ARTERY OF NATIVE HEART WITH ANGINA PECTORIS: ICD-10-CM

## 2023-07-24 LAB
ALBUMIN SERPL-MCNC: 4.4 G/DL (ref 3.5–5.2)
ALBUMIN/GLOB SERPL: 1.5 G/DL
ALP SERPL-CCNC: 126 U/L (ref 39–117)
ALT SERPL W P-5'-P-CCNC: 11 U/L (ref 1–41)
ANION GAP SERPL CALCULATED.3IONS-SCNC: 7 MMOL/L (ref 5–15)
AST SERPL-CCNC: 17 U/L (ref 1–40)
BASOPHILS # BLD AUTO: 0.05 10*3/MM3 (ref 0–0.2)
BASOPHILS NFR BLD AUTO: 0.8 % (ref 0–1.5)
BILIRUB SERPL-MCNC: 0.6 MG/DL (ref 0–1.2)
BUN SERPL-MCNC: 22 MG/DL (ref 8–23)
BUN/CREAT SERPL: 19.3 (ref 7–25)
CALCIUM SPEC-SCNC: 9.1 MG/DL (ref 8.6–10.5)
CHLORIDE SERPL-SCNC: 104 MMOL/L (ref 98–107)
CO2 SERPL-SCNC: 29 MMOL/L (ref 22–29)
CREAT SERPL-MCNC: 1.14 MG/DL (ref 0.76–1.27)
DEPRECATED RDW RBC AUTO: 39.5 FL (ref 37–54)
EGFRCR SERPLBLD CKD-EPI 2021: 66.7 ML/MIN/1.73
EOSINOPHIL # BLD AUTO: 0.17 10*3/MM3 (ref 0–0.4)
EOSINOPHIL NFR BLD AUTO: 2.7 % (ref 0.3–6.2)
ERYTHROCYTE [DISTWIDTH] IN BLOOD BY AUTOMATED COUNT: 12.6 % (ref 12.3–15.4)
GLOBULIN UR ELPH-MCNC: 3 GM/DL
GLUCOSE SERPL-MCNC: 127 MG/DL (ref 65–99)
HCT VFR BLD AUTO: 48.9 % (ref 37.5–51)
HGB BLD-MCNC: 16 G/DL (ref 13–17.7)
IMM GRANULOCYTES # BLD AUTO: 0.02 10*3/MM3 (ref 0–0.05)
IMM GRANULOCYTES NFR BLD AUTO: 0.3 % (ref 0–0.5)
INR PPP: 1 (ref 0.91–1.09)
LYMPHOCYTES # BLD AUTO: 1.16 10*3/MM3 (ref 0.7–3.1)
LYMPHOCYTES NFR BLD AUTO: 18.4 % (ref 19.6–45.3)
MCH RBC QN AUTO: 28.5 PG (ref 26.6–33)
MCHC RBC AUTO-ENTMCNC: 32.7 G/DL (ref 31.5–35.7)
MCV RBC AUTO: 87.2 FL (ref 79–97)
MONOCYTES # BLD AUTO: 0.6 10*3/MM3 (ref 0.1–0.9)
MONOCYTES NFR BLD AUTO: 9.5 % (ref 5–12)
NEUTROPHILS NFR BLD AUTO: 4.3 10*3/MM3 (ref 1.7–7)
NEUTROPHILS NFR BLD AUTO: 68.3 % (ref 42.7–76)
NRBC BLD AUTO-RTO: 0 /100 WBC (ref 0–0.2)
PLATELET # BLD AUTO: 201 10*3/MM3 (ref 140–450)
PMV BLD AUTO: 11 FL (ref 6–12)
POTASSIUM SERPL-SCNC: 4.4 MMOL/L (ref 3.5–5.2)
PROT SERPL-MCNC: 7.4 G/DL (ref 6–8.5)
PROTHROMBIN TIME: 13.3 SECONDS (ref 11.8–14.8)
RBC # BLD AUTO: 5.61 10*6/MM3 (ref 4.14–5.8)
SODIUM SERPL-SCNC: 140 MMOL/L (ref 136–145)
WBC NRBC COR # BLD: 6.3 10*3/MM3 (ref 3.4–10.8)

## 2023-07-24 PROCEDURE — 25010000002 HEPARIN (PORCINE) 2000-0.9 UNIT/L-% SOLUTION: Performed by: INTERNAL MEDICINE

## 2023-07-24 PROCEDURE — 93458 L HRT ARTERY/VENTRICLE ANGIO: CPT | Performed by: INTERNAL MEDICINE

## 2023-07-24 PROCEDURE — 25010000002 HEPARIN (PORCINE) PER 1000 UNITS: Performed by: INTERNAL MEDICINE

## 2023-07-24 PROCEDURE — 99152 MOD SED SAME PHYS/QHP 5/>YRS: CPT | Performed by: INTERNAL MEDICINE

## 2023-07-24 PROCEDURE — 85610 PROTHROMBIN TIME: CPT | Performed by: INTERNAL MEDICINE

## 2023-07-24 PROCEDURE — 85025 COMPLETE CBC W/AUTO DIFF WBC: CPT | Performed by: INTERNAL MEDICINE

## 2023-07-24 PROCEDURE — 25010000002 FENTANYL CITRATE (PF) 50 MCG/ML SOLUTION: Performed by: INTERNAL MEDICINE

## 2023-07-24 PROCEDURE — 80053 COMPREHEN METABOLIC PANEL: CPT | Performed by: INTERNAL MEDICINE

## 2023-07-24 PROCEDURE — C1894 INTRO/SHEATH, NON-LASER: HCPCS | Performed by: INTERNAL MEDICINE

## 2023-07-24 PROCEDURE — 25010000002 MIDAZOLAM PER 1 MG: Performed by: INTERNAL MEDICINE

## 2023-07-24 PROCEDURE — 25010000002 DIPHENHYDRAMINE PER 50 MG: Performed by: INTERNAL MEDICINE

## 2023-07-24 PROCEDURE — C1769 GUIDE WIRE: HCPCS | Performed by: INTERNAL MEDICINE

## 2023-07-24 PROCEDURE — 25510000001 IOPAMIDOL PER 1 ML: Performed by: INTERNAL MEDICINE

## 2023-07-24 PROCEDURE — 25010000002 HEPARIN (PORCINE) 1000-0.9 UT/500ML-% SOLUTION: Performed by: INTERNAL MEDICINE

## 2023-07-24 RX ORDER — DIPHENHYDRAMINE HYDROCHLORIDE 50 MG/ML
INJECTION INTRAMUSCULAR; INTRAVENOUS
Status: DISCONTINUED | OUTPATIENT
Start: 2023-07-24 | End: 2023-07-24 | Stop reason: HOSPADM

## 2023-07-24 RX ORDER — SODIUM CHLORIDE 9 MG/ML
100 INJECTION, SOLUTION INTRAVENOUS CONTINUOUS
Status: CANCELLED | OUTPATIENT
Start: 2023-07-24 | End: 2023-07-24

## 2023-07-24 RX ORDER — HEPARIN SODIUM 1000 [USP'U]/ML
INJECTION, SOLUTION INTRAVENOUS; SUBCUTANEOUS
Status: DISCONTINUED | OUTPATIENT
Start: 2023-07-24 | End: 2023-07-24 | Stop reason: HOSPADM

## 2023-07-24 RX ORDER — SODIUM CHLORIDE 0.9 % (FLUSH) 0.9 %
3 SYRINGE (ML) INJECTION EVERY 12 HOURS SCHEDULED
Status: DISCONTINUED | OUTPATIENT
Start: 2023-07-24 | End: 2023-07-24 | Stop reason: HOSPADM

## 2023-07-24 RX ORDER — MIDAZOLAM HYDROCHLORIDE 1 MG/ML
INJECTION INTRAMUSCULAR; INTRAVENOUS
Status: DISCONTINUED | OUTPATIENT
Start: 2023-07-24 | End: 2023-07-24 | Stop reason: HOSPADM

## 2023-07-24 RX ORDER — VERAPAMIL HYDROCHLORIDE 2.5 MG/ML
INJECTION, SOLUTION INTRAVENOUS
Status: DISCONTINUED | OUTPATIENT
Start: 2023-07-24 | End: 2023-07-24 | Stop reason: HOSPADM

## 2023-07-24 RX ORDER — ACETAMINOPHEN 325 MG/1
650 TABLET ORAL EVERY 4 HOURS PRN
Status: CANCELLED | OUTPATIENT
Start: 2023-07-24

## 2023-07-24 RX ORDER — FENTANYL CITRATE 50 UG/ML
INJECTION, SOLUTION INTRAMUSCULAR; INTRAVENOUS
Status: DISCONTINUED | OUTPATIENT
Start: 2023-07-24 | End: 2023-07-24 | Stop reason: HOSPADM

## 2023-07-24 RX ORDER — ASPIRIN 81 MG/1
TABLET, CHEWABLE ORAL
Status: DISCONTINUED | OUTPATIENT
Start: 2023-07-24 | End: 2023-07-24 | Stop reason: HOSPADM

## 2023-07-24 RX ORDER — SODIUM CHLORIDE 9 MG/ML
40 INJECTION, SOLUTION INTRAVENOUS AS NEEDED
Status: DISCONTINUED | OUTPATIENT
Start: 2023-07-24 | End: 2023-07-24 | Stop reason: HOSPADM

## 2023-07-24 RX ORDER — HEPARIN SODIUM 200 [USP'U]/100ML
INJECTION, SOLUTION INTRAVENOUS
Status: DISCONTINUED | OUTPATIENT
Start: 2023-07-24 | End: 2023-07-24 | Stop reason: HOSPADM

## 2023-07-24 RX ORDER — LIDOCAINE HYDROCHLORIDE 20 MG/ML
INJECTION, SOLUTION INFILTRATION; PERINEURAL
Status: DISCONTINUED | OUTPATIENT
Start: 2023-07-24 | End: 2023-07-24 | Stop reason: HOSPADM

## 2023-07-24 RX ORDER — NITROGLYCERIN 0.4 MG/1
0.4 TABLET SUBLINGUAL
Status: CANCELLED | OUTPATIENT
Start: 2023-07-24

## 2023-07-24 RX ORDER — SODIUM CHLORIDE 0.9 % (FLUSH) 0.9 %
10 SYRINGE (ML) INJECTION AS NEEDED
Status: DISCONTINUED | OUTPATIENT
Start: 2023-07-24 | End: 2023-07-24 | Stop reason: HOSPADM

## 2023-07-24 RX ORDER — SODIUM CHLORIDE 9 MG/ML
75 INJECTION, SOLUTION INTRAVENOUS CONTINUOUS
Status: DISCONTINUED | OUTPATIENT
Start: 2023-07-24 | End: 2023-07-24 | Stop reason: HOSPADM

## 2023-07-24 RX ADMIN — SODIUM CHLORIDE 75 ML/HR: 900 INJECTION INTRAVENOUS at 10:15

## 2023-07-24 NOTE — Clinical Note
Allergies reviewed.  H&P note has been confirmed for the patient. Procedural consent has been signed.  Blood consent hs been signed. Staff has reviewed the patient's labs.  Labs have been reviewed and are in within normal limits,

## 2023-07-24 NOTE — Clinical Note
Hemostasis started on the right radial artery. R-Band was used in achieving hemostasis. Radial compression device applied to vessel. Hemostasis achieved successfully. No Improved

## 2023-07-24 NOTE — INTERVAL H&P NOTE
H&P reviewed. The patient was examined and there are no changes to the H&P.      I independently reviewed the stress echocardiogram as well as a stress ECGs, and agree with the interpretations provided by Dr. Garcia.  This is a high risk stress echocardiogram.  In light of reported symptoms, significant concern for development of obstructive coronary disease and therefore we will proceed with diagnostic coronary angiography and possible intervention.  Physical exam supports radial approach with a normal Kashif's test.    I discussed cardiac catheterization, the procedure, risks (including bleeding, infection, vascular damage [including minor oozing, bruising, bleeding, and up to and including the need for vascular surgery], contrast reaction, renal failure, respiratory failure, heart attack, stroke, arrhythmia and even death), benefits, and alternatives and the patient has voiced understanding and is willing to proceed.

## 2023-07-25 ENCOUNTER — TELEPHONE (OUTPATIENT)
Dept: CARDIAC SURGERY | Facility: CLINIC | Age: 78
End: 2023-07-25
Payer: MEDICARE

## 2023-07-25 NOTE — TELEPHONE ENCOUNTER
Pt had originally been sched for appt with Dr Gonzalez on 8-2-23 as referral indicated pt needed to be seen as soon as possible.  Pt called back after he got home from hospital and requested appt with Dr Cheema instead.  Appt was changed to 8-10-23 and a message was sent to Dr Cheema to see if this date was OK or if he wanted pt worked in sooner.  Dr Cheema said he would like to see pt on 7-28-23 at 10am.  Called pt to offer this appt but pt declined and stated he wanted to cancel the appt altogether.  I repeated this to the pt to verify and he again said he wanted to cancel.  Once more I asked pt if he had decided not to pursue surgery and pt stated that was correct and that he wanted to cancel the appt.  Appt cx'd as pt requested and message sent to Dr Cheema to notify him of pt's decision/leyda

## 2023-08-01 NOTE — TELEPHONE ENCOUNTER
Pearl, can you check on this?  I don't see where the pt ever responded to the [employer] message that Dr Garcia sent.  Thanks!/leyda

## 2023-08-01 NOTE — TELEPHONE ENCOUNTER
I sched appt on 8-17-23 just to hold a slot for pt in case this date fills before Dr Cheema is back in the office/leyda

## 2023-08-01 NOTE — TELEPHONE ENCOUNTER
I've reached out to  and have explained the importance of meeting with Dr. Ernesto Cheema, patient has now agreed to be added to  schedule to discuss surgical options.    Thanks  WF

## 2023-08-01 NOTE — TELEPHONE ENCOUNTER
Called pt back and informed him the original appt on 8-10-23 is no longer available.  Explained that Dr Cheema is out of the office this week but that I would ask him on Monday when he returns to find out when he would like to see pt and call him back.  Pt voiced understanding.  Next available appt at this time is 8-17-23.  OK to wait until then or when to see pt?/leyda

## 2023-08-17 ENCOUNTER — OFFICE VISIT (OUTPATIENT)
Dept: CARDIAC SURGERY | Facility: CLINIC | Age: 78
End: 2023-08-17
Payer: MEDICARE

## 2023-08-17 ENCOUNTER — TELEPHONE (OUTPATIENT)
Dept: CARDIAC SURGERY | Facility: CLINIC | Age: 78
End: 2023-08-17
Payer: MEDICARE

## 2023-08-17 ENCOUNTER — PREP FOR SURGERY (OUTPATIENT)
Dept: OTHER | Facility: HOSPITAL | Age: 78
End: 2023-08-17
Payer: MEDICARE

## 2023-08-17 VITALS
DIASTOLIC BLOOD PRESSURE: 64 MMHG | SYSTOLIC BLOOD PRESSURE: 120 MMHG | HEIGHT: 67 IN | BODY MASS INDEX: 27.03 KG/M2 | OXYGEN SATURATION: 96 % | WEIGHT: 172.2 LBS | HEART RATE: 50 BPM

## 2023-08-17 DIAGNOSIS — R06.02 SOB (SHORTNESS OF BREATH): ICD-10-CM

## 2023-08-17 DIAGNOSIS — I79.8 OTHER DISORDERS OF ARTERIES, ARTERIOLES AND CAPILLARIES IN DISEASES CLASSIFIED ELSEWHERE: ICD-10-CM

## 2023-08-17 DIAGNOSIS — I25.119 CORONARY ARTERY DISEASE INVOLVING NATIVE CORONARY ARTERY OF NATIVE HEART WITH ANGINA PECTORIS: Primary | ICD-10-CM

## 2023-08-17 DIAGNOSIS — R73.9 HYPERGLYCEMIA: ICD-10-CM

## 2023-08-17 DIAGNOSIS — I25.118 ATHEROSCLEROTIC HEART DISEASE OF NATIVE CORONARY ARTERY WITH OTHER FORMS OF ANGINA PECTORIS: ICD-10-CM

## 2023-08-17 DIAGNOSIS — I25.10 LEFT MAIN CORONARY ARTERY DISEASE: ICD-10-CM

## 2023-08-17 PROCEDURE — 99204 OFFICE O/P NEW MOD 45 MIN: CPT | Performed by: SURGERY

## 2023-08-17 PROCEDURE — 3074F SYST BP LT 130 MM HG: CPT | Performed by: SURGERY

## 2023-08-17 PROCEDURE — 3078F DIAST BP <80 MM HG: CPT | Performed by: SURGERY

## 2023-08-17 RX ORDER — SODIUM CHLORIDE 0.9 % (FLUSH) 0.9 %
30 SYRINGE (ML) INJECTION ONCE AS NEEDED
OUTPATIENT
Start: 2023-08-17

## 2023-08-17 RX ORDER — SODIUM CHLORIDE 9 MG/ML
30 INJECTION, SOLUTION INTRAVENOUS CONTINUOUS PRN
OUTPATIENT
Start: 2023-08-17

## 2023-08-17 RX ORDER — ACETAMINOPHEN 500 MG
1000 TABLET ORAL ONCE
OUTPATIENT
Start: 2023-08-29

## 2023-08-17 RX ORDER — NICOTINE POLACRILEX 4 MG
15 LOZENGE BUCCAL
OUTPATIENT
Start: 2023-08-17

## 2023-08-17 RX ORDER — SODIUM CHLORIDE 0.9 % (FLUSH) 0.9 %
10 SYRINGE (ML) INJECTION EVERY 12 HOURS SCHEDULED
OUTPATIENT
Start: 2023-08-17

## 2023-08-17 RX ORDER — DEXTROSE MONOHYDRATE 25 G/50ML
10-50 INJECTION, SOLUTION INTRAVENOUS
OUTPATIENT
Start: 2023-08-17

## 2023-08-17 RX ORDER — SODIUM CHLORIDE 0.9 % (FLUSH) 0.9 %
10 SYRINGE (ML) INJECTION AS NEEDED
OUTPATIENT
Start: 2023-08-17

## 2023-08-17 RX ORDER — SODIUM CHLORIDE 9 MG/ML
40 INJECTION, SOLUTION INTRAVENOUS AS NEEDED
OUTPATIENT
Start: 2023-08-17

## 2023-08-17 NOTE — H&P (VIEW-ONLY)
Cardiac Surgery Consultation    Referring physician: Dr. Blayne Garcia     Primary care physician: Dr. Erwin Ruelas     The patient is accompanied by an adult female, Jamila.     Chief Complaint   Patient presents with    Coronary Artery Disease     New patient referred from Dr. Garcia for left main CAD         Subjective     History of Present Illness  The patient describes 3 months ago he started to head back to his house from the barn 100 feet down the hill, he had to stop and take a breath. He notes he started to experience onset diaphoresis squeezing, and tightness from shoulder to shoulder. He denies symptoms in his jaw and arm.      The patient states since his episode he experiences persistent pain, and a burning sensation at rest, and with activity. He notes he is highly active. The patient states otherwise he is healthy.        He notes after describing his episode to Dr. Garcia a stress test was ordered. He adds that he underwent a heart catheterization with Dr. Cardona.       The adult female states the patient is having trouble sleeping. The patient states he can lay flat. He notes if he takes two amitriptyline prescribed by Dr. Ruelas he can do better.      He reports he discontinued smoking in 1984. He notes he smoked for 20 years 1 pack a day.      He denies a history of stroke, or a mini stroke. He denies previous surgeries on his heart, lungs, or chest.      He states two brothers passed away from cardiovascular disease.      The patient states he worked at [employer] for 33 years.      Review of Systems     A complete review of systems was performed, is negative except stated above.    Past Medical History:   Diagnosis Date    Coronary 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 heart with angina pectoris 7/19/2023    Diverticulosis     Erectile disorder due to 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     GERD (gastroesophageal reflux disease)     Heart murmur     Hyperlipidemia      Hypertension     Insomnia     Kidney stone     Prostate cancer      Past Surgical History:   Procedure Laterality Date    CARDIAC CATHETERIZATION N/A 2023    Procedure: Left Heart Cath;  Surgeon: Blayne Garcia MD;  Location:  [facility];  Service: Cardiology;  Laterality: N/A;    CHOLECYSTECTOMY      COLONOSCOPY  2006    left sided diverticulosis    ENDOSCOPY  2006    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;s distal esophagus    PROSTATE BIOPSY N/A 2020    Procedure: PROSTATE ULTRASOUND URONAV FUSION BIOPSY.;  Surgeon: Watson Sheehan MD;  Location:  [facility] OR;  Service: Urology;  Laterality: N/A;     Family History   Problem Relation Age of Onset    No Known Problems Mother     Heart disease Father         Smoker    Hypertension Maternal Grandfather         Smoker    Hypertension Paternal Uncle         Smoker    Colon cancer Neg Hx     Colon polyps Neg Hx     Esophageal cancer Neg Hx      Social History     Tobacco Use    Smoking status: Former     Packs/day: 1.30     Years: 28.00     Pack years: 36.40     Types: Cigarettes     Start date: 1959     Quit date: 1984     Years since quittin.6     Passive exposure: Past    Smokeless tobacco: Never   Vaping Use    Vaping Use: Never used   Substance Use Topics    Alcohol use: Not Currently     Alcohol/week: 1.0 standard drink     Types: 1 Cans of beer per week    Drug use: No     Current Outpatient Medications   Medication Sig Dispense Refill    amitriptyline (ELAVIL) 25 MG tablet Take 1 tablet by mouth Every Night. 90 tablet 3    amLODIPine (NORVASC) 5 MG tablet Take 1 tablet by mouth Daily. 90 tablet 3    aspirin 81 MG EC tablet Take 1 tablet by mouth Daily.      atorvastatin (LIPITOR) 40 MG tablet Take 1 tablet by mouth Every Night. 90 tablet 3    esomeprazole (nexIUM) 40 MG capsule TAKE 1 CAPSULE BY MOUTH DAILY 90 capsule 1    metoprolol tartrate (LOPRESSOR) 50 MG tablet Take 1 tablet by mouth 2 (Two) Times a Day. 180 tablet 3     "nitroglycerin (NITROSTAT) 0.4 MG SL tablet 1 under the tongue as needed for angina, may repeat q5mins for up three doses 100 tablet 11    sildenafil (VIAGRA) 100 MG tablet TAKE 1 TABLET ONCE DAILY AS NEEDED FOR ERECTILE DYSFUNCTION. 20 tablet 0    [START ON 8/28/2023] mupirocin (BACTROBAN) 2 % nasal ointment 1 application  into the nostril(s) as directed by provider 1 (One) Time for 1 dose. Apply a pea-sized amount to a Q-tip and swab each nare x1 dose on 8/28/2023 at 1700 1 each 0     No current facility-administered medications for this visit.     Allergies:  Patient has no known allergies.    Objective      Vital Signs  Visit Vitals  /64 (BP Location: Right arm, Patient Position: Sitting, Cuff Size: Adult)   Pulse 50   Ht 170.2 cm (67\")   Wt 78.1 kg (172 lb 3.2 oz)   SpO2 96%   BMI 26.97 kg/m²         Physical Exam  /64 (BP Location: Right arm, Patient Position: Sitting, Cuff Size: Adult)   Pulse 50   Ht 170.2 cm (67\")   Wt 78.1 kg (172 lb 3.2 oz)   SpO2 96%   BMI 26.97 kg/m²     General Appearance:    Alert, cooperative, no distress, appears stated age   Head:    Normocephalic, without obvious abnormality, atraumatic   Eyes:    PERRL, conjunctiva/corneas clear, EOM's intact, fundi     benign, both eyes        Ears:    Normal TM's and external ear canals, both ears   Nose:   Nares normal, septum midline, mucosa normal, no drainage    or sinus tenderness   Throat:   Lips, mucosa, and tongue normal; teeth and gums normal   Neck:   Supple, symmetrical, trachea midline, no adenopathy;        thyroid:  No enlargement/tenderness/nodules; no carotid    bruit or JVD   Back:     Symmetric, no curvature, ROM normal, no CVA tenderness   Lungs:     Clear to auscultation bilaterally, respirations unlabored   Chest wall:    No tenderness or deformity   Heart:    Regular rate and rhythm, S1 and S2 normal, no murmur, rub    or gallop   Abdomen:     Soft, non-tender, bowel sounds active all four quadrants,     no " masses, no organomegaly   Genitalia:    Normal male without lesion, discharge or tenderness   Rectal:    Normal tone, normal prostate, no masses or tenderness;    guaiac negative stool   Extremities:   Extremities normal, atraumatic, no cyanosis or edema   Pulses:   2+ and symmetric all extremities   Skin:   Skin color, texture, turgor normal, no rashes or lesions   Lymph nodes:   Cervical, supraclavicular, and axillary nodes normal   Neurologic:   CNII-XII intact. Normal strength, sensation and reflexes       throughout      Results Review:     WBC   Date Value Ref Range Status   07/24/2023 6.30 3.40 - 10.80 10*3/mm3 Final   02/21/2023 6.50 3.40 - 10.80 10*3/mm3 Final     RBC   Date Value Ref Range Status   07/24/2023 5.61 4.14 - 5.80 10*6/mm3 Final   02/21/2023 5.22 4.14 - 5.80 10*6/mm3 Final     Hemoglobin   Date Value Ref Range Status   07/24/2023 16.0 13.0 - 17.7 g/dL Final     Hematocrit   Date Value Ref Range Status   07/24/2023 48.9 37.5 - 51.0 % Final     MCV   Date Value Ref Range Status   07/24/2023 87.2 79.0 - 97.0 fL Final     MCH   Date Value Ref Range Status   07/24/2023 28.5 26.6 - 33.0 pg Final     MCHC   Date Value Ref Range Status   07/24/2023 32.7 31.5 - 35.7 g/dL Final     RDW   Date Value Ref Range Status   07/24/2023 12.6 12.3 - 15.4 % Final     RDW-SD   Date Value Ref Range Status   07/24/2023 39.5 37.0 - 54.0 fl Final     MPV   Date Value Ref Range Status   07/24/2023 11.0 6.0 - 12.0 fL Final     Platelets   Date Value Ref Range Status   07/24/2023 201 140 - 450 10*3/mm3 Final     Neutrophil %   Date Value Ref Range Status   07/24/2023 68.3 42.7 - 76.0 % Final     Lymphocyte %   Date Value Ref Range Status   07/24/2023 18.4 (L) 19.6 - 45.3 % Final     Monocyte %   Date Value Ref Range Status   07/24/2023 9.5 5.0 - 12.0 % Final     Eosinophil %   Date Value Ref Range Status   07/24/2023 2.7 0.3 - 6.2 % Final     Basophil %   Date Value Ref Range Status   07/24/2023 0.8 0.0 - 1.5 % Final      Immature Grans %   Date Value Ref Range Status   07/24/2023 0.3 0.0 - 0.5 % Final     Neutrophils, Absolute   Date Value Ref Range Status   07/24/2023 4.30 1.70 - 7.00 10*3/mm3 Final     Lymphocytes, Absolute   Date Value Ref Range Status   07/24/2023 1.16 0.70 - 3.10 10*3/mm3 Final     Monocytes, Absolute   Date Value Ref Range Status   07/24/2023 0.60 0.10 - 0.90 10*3/mm3 Final     Eosinophils, Absolute   Date Value Ref Range Status   07/24/2023 0.17 0.00 - 0.40 10*3/mm3 Final     Basophils, Absolute   Date Value Ref Range Status   07/24/2023 0.05 0.00 - 0.20 10*3/mm3 Final     Immature Grans, Absolute   Date Value Ref Range Status   07/24/2023 0.02 0.00 - 0.05 10*3/mm3 Final     nRBC   Date Value Ref Range Status   07/24/2023 0.0 0.0 - 0.2 /100 WBC Final     Glucose   Date Value Ref Range Status   07/24/2023 127 (H) 65 - 99 mg/dL Final     Sodium   Date Value Ref Range Status   07/24/2023 140 136 - 145 mmol/L Final     Potassium   Date Value Ref Range Status   07/24/2023 4.4 3.5 - 5.2 mmol/L Final     CO2   Date Value Ref Range Status   07/24/2023 29.0 22.0 - 29.0 mmol/L Final     Chloride   Date Value Ref Range Status   07/24/2023 104 98 - 107 mmol/L Final     Anion Gap   Date Value Ref Range Status   07/24/2023 7.0 5.0 - 15.0 mmol/L Final     Creatinine   Date Value Ref Range Status   07/24/2023 1.14 0.76 - 1.27 mg/dL Final   11/27/2021 1.10 0.60 - 1.30 mg/dL Final     Comment:     Serial Number: [id number]:  [id number]     BUN   Date Value Ref Range Status   07/24/2023 22 8 - 23 mg/dL Final     BUN/Creatinine Ratio   Date Value Ref Range Status   07/24/2023 19.3 7.0 - 25.0 Final     Calcium   Date Value Ref Range Status   07/24/2023 9.1 8.6 - 10.5 mg/dL Final     eGFR Non  Am   Date Value Ref Range Status   02/23/2022 61 >60 mL/min/1.73 Final     Comment:     The MDRD GFR formula is only valid for adults with stable  renal function between ages 18 and 70.       eGFR Non  Amer   Date Value Ref  Range Status   06/24/2020 68 >60 mL/min/1.73 Final     Alkaline Phosphatase   Date Value Ref Range Status   07/24/2023 126 (H) 39 - 117 U/L Final     Total Protein   Date Value Ref Range Status   07/24/2023 7.4 6.0 - 8.5 g/dL Final     ALT (SGPT)   Date Value Ref Range Status   07/24/2023 11 1 - 41 U/L Final     AST (SGOT)   Date Value Ref Range Status   07/24/2023 17 1 - 40 U/L Final     Total Bilirubin   Date Value Ref Range Status   07/24/2023 0.6 0.0 - 1.2 mg/dL Final     Albumin   Date Value Ref Range Status   07/24/2023 4.4 3.5 - 5.2 g/dL Final     Globulin   Date Value Ref Range Status   07/24/2023 3.0 gm/dL Final     A/G Ratio   Date Value Ref Range Status   02/21/2023 1.8 g/dL Final        I reviewed the patient's clinical results and discussed with patient.        STRESS ECHO: Interpretation Summary      Abnormal stress echo with echocardiographic evidence for myocardial ischemia.    Left ventricular systolic function is normal. Left ventricular ejection fraction appears to be 56 - 60%.    EKG positive for ischemia in inferior and inferolateral leads    The following left ventricular wall segments are hypokinetic: mid anterior, apical lateral, apical septal and apex hypokinetic. The following left ventricular wall segments are hyperkinetic: basal anterior, basal anterolateral, basal inferolateral, basal inferior, basal inferoseptal, basal anteroseptal, mid inferolateral, mid inferior and apical anterior.       Cath: Findings:    Hemodynamics: Yt=482/59, m84, KU=091, LVEDP=17, AV grad=15 (peak-to-peak)    Left ventriculography:  1. The contractility of the left ventricle is normal. Estimated ejection fraction >60%.  2. The left ventricle is normal in wall thickness and chamber size.  3. There is no significant mitral regurgitation.    Selective coronary angiography:   Dominance: Right    Left Main coronary artery: Medium caliber vessel arising normally from the left cusp prior to bifurcating. There is an  eccentric 60-70% stenosis in the mid to distal aspect of the vessel, up to the point of bifurcation into the LAD and circumflex.    Left anterior descending artery: Medium caliber vessel arising normally from the distal left main that supplies several small caliber diagonal branches and multiple septal perforators as it courses the anterior interventricular groove, ultimately wrapping apex. Diffuse mild disease noted.    Left circumflex: Medium caliber vessel arising normally from the distal left main that is not dominant for posterior circulation. It has mild disease throughout the proximal aspect, then supplies a long, medium caliber OM1. He continues beyond this in the AV groove is a medium caliber vessel for supplying a very small caliber OM 2. OM1 has a series of sequential moderate to severe stenoses, with the worst being a focal 85% stenosis in the mid to distal portion of the vessel.    Right coronary artery: Medium caliber vessel arising normally from the right cusp that is dominant for posterior circulation, supplying a small caliber PDA and a small to medium caliber PL branch. Minor luminal irregularities throughout the AV groove portion of the vessel, and diffuse mild disease through the PDA.    Estimated Blood Loss: <10mL  Specimens: None  Complications: None     Impression:  1. Severe distal left main coronary artery disease, as well as long segment of moderate to severe stenoses throughout OM1.  2. Normal LVEF (greater than 60%), with mildly elevated EDP (17 mmHg)     Plan:   1. TR band off in 2 hours, then can be discharged home. I have placed an urgent outpatient evaluation for him to see cardiothoracic surgery for consideration of CABG.  2. In the meantime, he is on appropriate medical therapy for known disease including aspirin and high intensity statin  3. No other medical changes recommended at present; discussed when to seek emergent care for chest pain  4. Further routine follow-up with his  primary cardiologist, Dr. Garcia, to whom I did convey these results.       I personally reviewed images of following exams, the following is my interpretation:        CATH: Right dominant coronary system without significant disease in the RCA. There is severe distal left main stenosis and long segment stenosis and a large OM vessel, there is a more distal OM vessel that is very small and too small for grafting LAD is appropriate caliber for grafting. The midportion does have moderate calcification, but no hemodynamically significant stenosis. There is a few diagonal branches that do not need grafting. LV gram shows near normal LV function.            Assessment & Plan   Mr. Gomez is a 77-year-old male who presents with unstable angina and severe distal left main stenosis.  He also has stenosis of a large OM branch in its proximal to midportion with a graftable distal vessel. He had an episode of chest pain and is now having chest pain both at rest and with activity, but it is better than the one episode that prompted evaluation. His LV gram on coronary angiography appears near normal. He is otherwise quite healthy for his age and very active. He is a good candidate for coronary artery bypass grafting.     We discussed at length today the natural history of coronary disease such as his as well as treatment options. We discussed high risk PCI versus CABG. I recommend CABG and described the pros and cons of each approach. He agrees that CABG is the best treatment option for him. We discussed preoperative, operative and postoperative expectations of CABG at length.    We discussed the risk of surgery including but not limited to bleeding, infection, injury to major organs or vessels, chronic heart failure, arrhythmias, need for pacemaker, prolonged ventilation, renal failure, stroke, risk of anesthesia, risk of sternal wound or bone healing problems, reoperation, and/or death.     I calculated this patient's  specific STS risk score, discussed this with him. He understands and agrees to proceed with CABG.     We will complete his work-up with carotid duplex, vein mapping, CT of the chest, and echocardiogram. We will plan on a surgery on August 29.    Thank you for trusting me with the care of Mr. Gomez.  Please do not hesitate to call with any questions or concerns.    Ernesto Cheema MD   Cardiothoracic Surgeon     Transcribed from ambient dictation for Ernesto Cheema MD by Judy Downs.  08/17/23   13:18 CDT    Patient or patient representative verbalized consent to the visit recording.  I have personally performed the services described in this document as transcribed by the above individual, and it is both accurate and complete.

## 2023-08-17 NOTE — TELEPHONE ENCOUNTER
Surgery orders are in for 8/29/2023.  Please confirm with patient he does not take aspirin or Plavix.  Please call patient with prework information.  This case will be to follow T[facility].

## 2023-08-17 NOTE — LETTER
August 18, 2023       No Recipients    Patient: Nehemiah Gomez   YOB: 1945   Date of Visit: 8/17/2023       Dear Erwin Ruelas MD,    Nehemiah Gomez was in my office today. Below are the relevant portions of my assessment and plan of care.    Mr. Gomez is a 77-year-old male who presents with unstable angina and severe distal left main stenosis.  He also has stenosis of a large OM branch in its proximal to midportion with a graftable distal vessel. He had an episode of chest pain and is now having chest pain both at rest and with activity, but it is better than the one episode that prompted evaluation. His LV gram on coronary angiography appears near normal. He is otherwise quite healthy for his age and very active. He is a good candidate for coronary artery bypass grafting.     We discussed at length today the natural history of coronary disease such as his as well as treatment options. We discussed high risk PCI versus CABG. I recommend CABG and described the pros and cons of each approach. He agrees that CABG is the best treatment option for him. We discussed preoperative, operative and postoperative expectations of CABG at length.    We discussed the risk of surgery including but not limited to bleeding, infection, injury to major organs or vessels, chronic heart failure, arrhythmias, need for pacemaker, prolonged ventilation, renal failure, stroke, risk of anesthesia, risk of sternal wound or bone healing problems, reoperation, and/or death.     I calculated this patient's specific STS risk score, discussed this with him. He understands and agrees to proceed with CABG.     We will complete his work-up with carotid duplex, vein mapping, CT of the chest, and echocardiogram. We will plan on a surgery on August 29.    Thank you for trusting me with the care of Mr. Gomez.  Please do not hesitate to call with any questions or concerns.         Sincerely,        Ernesto Cheema MD        CC:   No  Recipients

## 2023-08-17 NOTE — TELEPHONE ENCOUNTER
Patient aware of prework date/time and OR date/arrival time 0830/npo/no meds. Confirmed with patient he does not currently take any anticoagulation other than 81 mg aspirin. Aware to  Bactroban for use on 8/28 @ 1700 - instructions given.

## 2023-08-17 NOTE — PROGRESS NOTES
Cardiac Surgery Consultation    Referring physician: Dr. Blayne Garcia     Primary care physician: Dr. Erwin Ruelas     The patient is accompanied by an adult female, Jamila.     Chief Complaint   Patient presents with    Coronary Artery Disease     New patient referred from Dr. Garcia for left main CAD         Subjective     History of Present Illness  The patient describes 3 months ago he started to head back to his house from the barn 100 feet down the hill, he had to stop and take a breath. He notes he started to experience onset diaphoresis squeezing, and tightness from shoulder to shoulder. He denies symptoms in his jaw and arm.      The patient states since his episode he experiences persistent pain, and a burning sensation at rest, and with activity. He notes he is highly active. The patient states otherwise he is healthy.        He notes after describing his episode to Dr. Garcia a stress test was ordered. He adds that he underwent a heart catheterization with Dr. Cardona.       The adult female states the patient is having trouble sleeping. The patient states he can lay flat. He notes if he takes two amitriptyline prescribed by Dr. Ruelas he can do better.      He reports he discontinued smoking in 1984. He notes he smoked for 20 years 1 pack a day.      He denies a history of stroke, or a mini stroke. He denies previous surgeries on his heart, lungs, or chest.      He states two brothers passed away from cardiovascular disease.      The patient states he worked at [employer] for 33 years.      Review of Systems     A complete review of systems was performed, is negative except stated above.    Past Medical History:   Diagnosis Date    Coronary 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 heart with angina pectoris 7/19/2023    Diverticulosis     Erectile disorder due to 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     GERD (gastroesophageal reflux disease)     Heart murmur     Hyperlipidemia      Hypertension     Insomnia     Kidney stone     Prostate cancer      Past Surgical History:   Procedure Laterality Date    CARDIAC CATHETERIZATION N/A 2023    Procedure: Left Heart Cath;  Surgeon: Blayne Garcia MD;  Location:  [facility]ATH [facility];  Service: Cardiology;  Laterality: N/A;    CHOLECYSTECTOMY      COLONOSCOPY  2006    left sided diverticulosis    ENDOSCOPY  2006    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;s distal esophagus    PROSTATE BIOPSY N/A 2020    Procedure: PROSTATE ULTRASOUND URONAV FUSION BIOPSY.;  Surgeon: Watson Sheehan MD;  Location:  [facility] OR;  Service: Urology;  Laterality: N/A;     Family History   Problem Relation Age of Onset    No Known Problems Mother     Heart disease Father         Smoker    Hypertension Maternal Grandfather         Smoker    Hypertension Paternal Uncle         Smoker    Colon cancer Neg Hx     Colon polyps Neg Hx     Esophageal cancer Neg Hx      Social History     Tobacco Use    Smoking status: Former     Packs/day: 1.30     Years: 28.00     Pack years: 36.40     Types: Cigarettes     Start date: 1959     Quit date: 1984     Years since quittin.6     Passive exposure: Past    Smokeless tobacco: Never   Vaping Use    Vaping Use: Never used   Substance Use Topics    Alcohol use: Not Currently     Alcohol/week: 1.0 standard drink     Types: 1 Cans of beer per week    Drug use: No     Current Outpatient Medications   Medication Sig Dispense Refill    amitriptyline (ELAVIL) 25 MG tablet Take 1 tablet by mouth Every Night. 90 tablet 3    amLODIPine (NORVASC) 5 MG tablet Take 1 tablet by mouth Daily. 90 tablet 3    aspirin 81 MG EC tablet Take 1 tablet by mouth Daily.      atorvastatin (LIPITOR) 40 MG tablet Take 1 tablet by mouth Every Night. 90 tablet 3    esomeprazole (nexIUM) 40 MG capsule TAKE 1 CAPSULE BY MOUTH DAILY 90 capsule 1    metoprolol tartrate (LOPRESSOR) 50 MG tablet Take 1 tablet by mouth 2 (Two) Times a Day. 180 tablet 3     "nitroglycerin (NITROSTAT) 0.4 MG SL tablet 1 under the tongue as needed for angina, may repeat q5mins for up three doses 100 tablet 11    sildenafil (VIAGRA) 100 MG tablet TAKE 1 TABLET ONCE DAILY AS NEEDED FOR ERECTILE DYSFUNCTION. 20 tablet 0    [START ON 8/28/2023] mupirocin (BACTROBAN) 2 % nasal ointment 1 application  into the nostril(s) as directed by provider 1 (One) Time for 1 dose. Apply a pea-sized amount to a Q-tip and swab each nare x1 dose on 8/28/2023 at 1700 1 each 0     No current facility-administered medications for this visit.     Allergies:  Patient has no known allergies.    Objective      Vital Signs  Visit Vitals  /64 (BP Location: Right arm, Patient Position: Sitting, Cuff Size: Adult)   Pulse 50   Ht 170.2 cm (67\")   Wt 78.1 kg (172 lb 3.2 oz)   SpO2 96%   BMI 26.97 kg/mý         Physical Exam  /64 (BP Location: Right arm, Patient Position: Sitting, Cuff Size: Adult)   Pulse 50   Ht 170.2 cm (67\")   Wt 78.1 kg (172 lb 3.2 oz)   SpO2 96%   BMI 26.97 kg/mý     General Appearance:    Alert, cooperative, no distress, appears stated age   Head:    Normocephalic, without obvious abnormality, atraumatic   Eyes:    PERRL, conjunctiva/corneas clear, EOM's intact, fundi     benign, both eyes        Ears:    Normal TM's and external ear canals, both ears   Nose:   Nares normal, septum midline, mucosa normal, no drainage    or sinus tenderness   Throat:   Lips, mucosa, and tongue normal; teeth and gums normal   Neck:   Supple, symmetrical, trachea midline, no adenopathy;        thyroid:  No enlargement/tenderness/nodules; no carotid    bruit or JVD   Back:     Symmetric, no curvature, ROM normal, no CVA tenderness   Lungs:     Clear to auscultation bilaterally, respirations unlabored   Chest wall:    No tenderness or deformity   Heart:    Regular rate and rhythm, S1 and S2 normal, no murmur, rub    or gallop   Abdomen:     Soft, non-tender, bowel sounds active all four quadrants,     no " masses, no organomegaly   Genitalia:    Normal male without lesion, discharge or tenderness   Rectal:    Normal tone, normal prostate, no masses or tenderness;    guaiac negative stool   Extremities:   Extremities normal, atraumatic, no cyanosis or edema   Pulses:   2+ and symmetric all extremities   Skin:   Skin color, texture, turgor normal, no rashes or lesions   Lymph nodes:   Cervical, supraclavicular, and axillary nodes normal   Neurologic:   CNII-XII intact. Normal strength, sensation and reflexes       throughout      Results Review:     WBC   Date Value Ref Range Status   07/24/2023 6.30 3.40 - 10.80 10*3/mm3 Final   02/21/2023 6.50 3.40 - 10.80 10*3/mm3 Final     RBC   Date Value Ref Range Status   07/24/2023 5.61 4.14 - 5.80 10*6/mm3 Final   02/21/2023 5.22 4.14 - 5.80 10*6/mm3 Final     Hemoglobin   Date Value Ref Range Status   07/24/2023 16.0 13.0 - 17.7 g/dL Final     Hematocrit   Date Value Ref Range Status   07/24/2023 48.9 37.5 - 51.0 % Final     MCV   Date Value Ref Range Status   07/24/2023 87.2 79.0 - 97.0 fL Final     MCH   Date Value Ref Range Status   07/24/2023 28.5 26.6 - 33.0 pg Final     MCHC   Date Value Ref Range Status   07/24/2023 32.7 31.5 - 35.7 g/dL Final     RDW   Date Value Ref Range Status   07/24/2023 12.6 12.3 - 15.4 % Final     RDW-SD   Date Value Ref Range Status   07/24/2023 39.5 37.0 - 54.0 fl Final     MPV   Date Value Ref Range Status   07/24/2023 11.0 6.0 - 12.0 fL Final     Platelets   Date Value Ref Range Status   07/24/2023 201 140 - 450 10*3/mm3 Final     Neutrophil %   Date Value Ref Range Status   07/24/2023 68.3 42.7 - 76.0 % Final     Lymphocyte %   Date Value Ref Range Status   07/24/2023 18.4 (L) 19.6 - 45.3 % Final     Monocyte %   Date Value Ref Range Status   07/24/2023 9.5 5.0 - 12.0 % Final     Eosinophil %   Date Value Ref Range Status   07/24/2023 2.7 0.3 - 6.2 % Final     Basophil %   Date Value Ref Range Status   07/24/2023 0.8 0.0 - 1.5 % Final      Immature Grans %   Date Value Ref Range Status   07/24/2023 0.3 0.0 - 0.5 % Final     Neutrophils, Absolute   Date Value Ref Range Status   07/24/2023 4.30 1.70 - 7.00 10*3/mm3 Final     Lymphocytes, Absolute   Date Value Ref Range Status   07/24/2023 1.16 0.70 - 3.10 10*3/mm3 Final     Monocytes, Absolute   Date Value Ref Range Status   07/24/2023 0.60 0.10 - 0.90 10*3/mm3 Final     Eosinophils, Absolute   Date Value Ref Range Status   07/24/2023 0.17 0.00 - 0.40 10*3/mm3 Final     Basophils, Absolute   Date Value Ref Range Status   07/24/2023 0.05 0.00 - 0.20 10*3/mm3 Final     Immature Grans, Absolute   Date Value Ref Range Status   07/24/2023 0.02 0.00 - 0.05 10*3/mm3 Final     nRBC   Date Value Ref Range Status   07/24/2023 0.0 0.0 - 0.2 /100 WBC Final     Glucose   Date Value Ref Range Status   07/24/2023 127 (H) 65 - 99 mg/dL Final     Sodium   Date Value Ref Range Status   07/24/2023 140 136 - 145 mmol/L Final     Potassium   Date Value Ref Range Status   07/24/2023 4.4 3.5 - 5.2 mmol/L Final     CO2   Date Value Ref Range Status   07/24/2023 29.0 22.0 - 29.0 mmol/L Final     Chloride   Date Value Ref Range Status   07/24/2023 104 98 - 107 mmol/L Final     Anion Gap   Date Value Ref Range Status   07/24/2023 7.0 5.0 - 15.0 mmol/L Final     Creatinine   Date Value Ref Range Status   07/24/2023 1.14 0.76 - 1.27 mg/dL Final   11/27/2021 1.10 0.60 - 1.30 mg/dL Final     Comment:     Serial Number: [id number]:  [id number]     BUN   Date Value Ref Range Status   07/24/2023 22 8 - 23 mg/dL Final     BUN/Creatinine Ratio   Date Value Ref Range Status   07/24/2023 19.3 7.0 - 25.0 Final     Calcium   Date Value Ref Range Status   07/24/2023 9.1 8.6 - 10.5 mg/dL Final     eGFR Non  Am   Date Value Ref Range Status   02/23/2022 61 >60 mL/min/1.73 Final     Comment:     The MDRD GFR formula is only valid for adults with stable  renal function between ages 18 and 70.       eGFR Non  Amer   Date Value Ref  Range Status   06/24/2020 68 >60 mL/min/1.73 Final     Alkaline Phosphatase   Date Value Ref Range Status   07/24/2023 126 (H) 39 - 117 U/L Final     Total Protein   Date Value Ref Range Status   07/24/2023 7.4 6.0 - 8.5 g/dL Final     ALT (SGPT)   Date Value Ref Range Status   07/24/2023 11 1 - 41 U/L Final     AST (SGOT)   Date Value Ref Range Status   07/24/2023 17 1 - 40 U/L Final     Total Bilirubin   Date Value Ref Range Status   07/24/2023 0.6 0.0 - 1.2 mg/dL Final     Albumin   Date Value Ref Range Status   07/24/2023 4.4 3.5 - 5.2 g/dL Final     Globulin   Date Value Ref Range Status   07/24/2023 3.0 gm/dL Final     A/G Ratio   Date Value Ref Range Status   02/21/2023 1.8 g/dL Final        I reviewed the patient's clinical results and discussed with patient.        STRESS ECHO: Interpretation Summary      Abnormal stress echo with echocardiographic evidence for myocardial ischemia.    Left ventricular systolic function is normal. Left ventricular ejection fraction appears to be 56 - 60%.    EKG positive for ischemia in inferior and inferolateral leads    The following left ventricular wall segments are hypokinetic: mid anterior, apical lateral, apical septal and apex hypokinetic. The following left ventricular wall segments are hyperkinetic: basal anterior, basal anterolateral, basal inferolateral, basal inferior, basal inferoseptal, basal anteroseptal, mid inferolateral, mid inferior and apical anterior.       Cath: Findings:    Hemodynamics: Cn=286/59, m84, EZ=343, LVEDP=17, AV grad=15 (peak-to-peak)    Left ventriculography:  1. The contractility of the left ventricle is normal. Estimated ejection fraction >60%.  2. The left ventricle is normal in wall thickness and chamber size.  3. There is no significant mitral regurgitation.    Selective coronary angiography:   Dominance: Right    Left Main coronary artery: Medium caliber vessel arising normally from the left cusp prior to bifurcating. There is an  eccentric 60-70% stenosis in the mid to distal aspect of the vessel, up to the point of bifurcation into the LAD and circumflex.    Left anterior descending artery: Medium caliber vessel arising normally from the distal left main that supplies several small caliber diagonal branches and multiple septal perforators as it courses the anterior interventricular groove, ultimately wrapping apex. Diffuse mild disease noted.    Left circumflex: Medium caliber vessel arising normally from the distal left main that is not dominant for posterior circulation. It has mild disease throughout the proximal aspect, then supplies a long, medium caliber OM1. He continues beyond this in the AV groove is a medium caliber vessel for supplying a very small caliber OM 2. OM1 has a series of sequential moderate to severe stenoses, with the worst being a focal 85% stenosis in the mid to distal portion of the vessel.    Right coronary artery: Medium caliber vessel arising normally from the right cusp that is dominant for posterior circulation, supplying a small caliber PDA and a small to medium caliber PL branch. Minor luminal irregularities throughout the AV groove portion of the vessel, and diffuse mild disease through the PDA.    Estimated Blood Loss: <10mL  Specimens: None  Complications: None     Impression:  1. Severe distal left main coronary artery disease, as well as long segment of moderate to severe stenoses throughout OM1.  2. Normal LVEF (greater than 60%), with mildly elevated EDP (17 mmHg)     Plan:   1. TR band off in 2 hours, then can be discharged home. I have placed an urgent outpatient evaluation for him to see cardiothoracic surgery for consideration of CABG.  2. In the meantime, he is on appropriate medical therapy for known disease including aspirin and high intensity statin  3. No other medical changes recommended at present; discussed when to seek emergent care for chest pain  4. Further routine follow-up with his  primary cardiologist, Dr. Garcia, to whom I did convey these results.       I personally reviewed images of following exams, the following is my interpretation:        CATH: Right dominant coronary system without significant disease in the RCA. There is severe distal left main stenosis and long segment stenosis and a large OM vessel, there is a more distal OM vessel that is very small and too small for grafting LAD is appropriate caliber for grafting. The midportion does have moderate calcification, but no hemodynamically significant stenosis. There is a few diagonal branches that do not need grafting. LV gram shows near normal LV function.            Assessment & Plan   Mr. Gomez is a 77-year-old male who presents with unstable angina and severe distal left main stenosis.  He also has stenosis of a large OM branch in its proximal to midportion with a graftable distal vessel. He had an episode of chest pain and is now having chest pain both at rest and with activity, but it is better than the one episode that prompted evaluation. His LV gram on coronary angiography appears near normal. He is otherwise quite healthy for his age and very active. He is a good candidate for coronary artery bypass grafting.     We discussed at length today the natural history of coronary disease such as his as well as treatment options. We discussed high risk PCI versus CABG. I recommend CABG and described the pros and cons of each approach. He agrees that CABG is the best treatment option for him. We discussed preoperative, operative and postoperative expectations of CABG at length.    We discussed the risk of surgery including but not limited to bleeding, infection, injury to major organs or vessels, chronic heart failure, arrhythmias, need for pacemaker, prolonged ventilation, renal failure, stroke, risk of anesthesia, risk of sternal wound or bone healing problems, reoperation, and/or death.     I calculated this patient's  specific STS risk score, discussed this with him. He understands and agrees to proceed with CABG.     We will complete his work-up with carotid duplex, vein mapping, CT of the chest, and echocardiogram. We will plan on a surgery on August 29.    Thank you for trusting me with the care of Mr. Gomez.  Please do not hesitate to call with any questions or concerns.    Ernesto Cheema MD   Cardiothoracic Surgeon     Transcribed from ambient dictation for Ernesto Cheema MD by Judy Downs.  08/17/23   13:18 CDT    Patient or patient representative verbalized consent to the visit recording.  I have personally performed the services described in this document as transcribed by the above individual, and it is both accurate and complete.

## 2023-08-21 ENCOUNTER — OFFICE VISIT (OUTPATIENT)
Dept: FAMILY MEDICINE CLINIC | Facility: CLINIC | Age: 78
End: 2023-08-21
Payer: MEDICARE

## 2023-08-21 VITALS
DIASTOLIC BLOOD PRESSURE: 78 MMHG | TEMPERATURE: 97.1 F | WEIGHT: 193 LBS | SYSTOLIC BLOOD PRESSURE: 126 MMHG | BODY MASS INDEX: 30.29 KG/M2 | OXYGEN SATURATION: 97 % | HEIGHT: 67 IN | HEART RATE: 58 BPM

## 2023-08-21 DIAGNOSIS — I10 PRIMARY HYPERTENSION: Primary | ICD-10-CM

## 2023-08-21 DIAGNOSIS — I25.119 CORONARY ARTERY DISEASE INVOLVING NATIVE CORONARY ARTERY OF NATIVE HEART WITH ANGINA PECTORIS: ICD-10-CM

## 2023-08-21 DIAGNOSIS — E78.2 MIXED HYPERLIPIDEMIA: ICD-10-CM

## 2023-08-21 DIAGNOSIS — C61 PROSTATE CANCER: ICD-10-CM

## 2023-08-21 PROCEDURE — 99213 OFFICE O/P EST LOW 20 MIN: CPT | Performed by: FAMILY MEDICINE

## 2023-08-21 PROCEDURE — 3074F SYST BP LT 130 MM HG: CPT | Performed by: FAMILY MEDICINE

## 2023-08-21 PROCEDURE — 3078F DIAST BP <80 MM HG: CPT | Performed by: FAMILY MEDICINE

## 2023-08-21 NOTE — PROGRESS NOTES
Subjective   Nehemiah Gomez is a 77 y.o. male.     Chief Complaint   Patient presents with    Hypertension    Hyperlipidemia    Coronary Artery Disease        Hypertension    Hyperlipidemia    Coronary Artery Disease  Risk factors include hyperlipidemia.      He notes good bp control without cp or ha--toeraing statin corin jolley --denies any angina symtoms--he will see urology again in december      Current Outpatient Medications:     amitriptyline (ELAVIL) 25 MG tablet, Take 1 tablet by mouth Every Night., Disp: 90 tablet, Rfl: 3    amLODIPine (NORVASC) 5 MG tablet, Take 1 tablet by mouth Daily., Disp: 90 tablet, Rfl: 3    aspirin 81 MG EC tablet, Take 1 tablet by mouth Daily., Disp: , Rfl:     atorvastatin (LIPITOR) 40 MG tablet, Take 1 tablet by mouth Every Night., Disp: 90 tablet, Rfl: 3    esomeprazole (nexIUM) 40 MG capsule, TAKE 1 CAPSULE BY MOUTH DAILY, Disp: 90 capsule, Rfl: 1    metoprolol tartrate (LOPRESSOR) 50 MG tablet, Take 1 tablet by mouth 2 (Two) Times a Day., Disp: 180 tablet, Rfl: 3    [START ON 8/28/2023] mupirocin (BACTROBAN) 2 % nasal ointment, 1 application  into the nostril(s) as directed by provider 1 (One) Time for 1 dose. Apply a pea-sized amount to a Q-tip and swab each nare x1 dose on 8/28/2023 at 1700, Disp: 1 each, Rfl: 0    nitroglycerin (NITROSTAT) 0.4 MG SL tablet, 1 under the tongue as needed for angina, may repeat q5mins for up three doses, Disp: 100 tablet, Rfl: 11    sildenafil (VIAGRA) 100 MG tablet, TAKE 1 TABLET ONCE DAILY AS NEEDED FOR ERECTILE DYSFUNCTION., Disp: 20 tablet, Rfl: 0  No Known Allergies           Past Medical History:   Diagnosis Date    Coronary 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 heart with angina pectoris 7/19/2023    Diverticulosis     Erectile disorder due to 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     GERD (gastroesophageal reflux disease)     Heart murmur     Hyperlipidemia     Hypertension     Insomnia     Kidney stone     Prostate  "cancer      Past Surgical History:   Procedure Laterality Date    CARDIAC CATHETERIZATION N/A 7/24/2023    Procedure: Left Heart Cath;  Surgeon: Blayne Garcia MD;  Location:  [facility];  Service: Cardiology;  Laterality: N/A;    CHOLECYSTECTOMY      COLONOSCOPY  03/13/2006    left sided diverticulosis    ENDOSCOPY  03/20/2006    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    HENT: Negative.     Eyes: Negative.    Respiratory: Negative.     Cardiovascular: Negative.    Gastrointestinal: Negative.    Endocrine: Negative.    Genitourinary: Negative.    Musculoskeletal: Negative.    Skin: Negative.    Allergic/Immunologic: Negative.    Neurological: Negative.    Hematological: Negative.    Psychiatric/Behavioral: Negative.       Objective /78   Pulse 58   Temp 97.1 øF (36.2 øC)   Ht 170.2 cm (67\")   Wt 87.5 kg (193 lb)   SpO2 97%   BMI 30.23 kg/mý    Physical Exam  Vitals and nursing note reviewed.   Constitutional:       Appearance: Normal appearance. He is normal weight.   HENT:      Head: Normocephalic and atraumatic.      Nose: Nose normal.      Mouth/Throat:      Mouth: Mucous membranes are moist.   Eyes:      Pupils: Pupils are equal, round, and reactive to light.   Cardiovascular:      Rate and Rhythm: Normal rate and regular rhythm.      Pulses: Normal pulses.      Heart sounds: Normal heart sounds.   Pulmonary:      Effort: Pulmonary effort is normal.      Breath sounds: Normal breath sounds.   Abdominal:      General: Abdomen is flat. Bowel sounds are normal.      Palpations: Abdomen is soft.   Musculoskeletal:         General: Normal range of motion.      Cervical back: Normal range of motion and neck supple.   Skin:     General: Skin is warm and dry.    "   Capillary Refill: Capillary refill takes less than 2 seconds.   Neurological:      General: No focal deficit present.      Mental Status: He is alert and oriented to person, place, and time. Mental status is at baseline.   Psychiatric:         Mood and Affect: Mood normal.       Assessment & Plan   Diagnoses and all orders for this visit:    1. Primary hypertension (Primary)  -     CBC & Differential  -     Comprehensive metabolic panel  -     Lipid Panel With / Chol / HDL Ratio    2. Coronary 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 heart with angina pectoris  -     CBC & Differential  -     Comprehensive metabolic panel  -     Lipid Panel With / Chol / HDL Ratio    3. Mixed hyperlipidemia  -     CBC & Differential  -     Comprehensive metabolic panel  -     Lipid Panel With / Chol / HDL Ratio    4. Prostate cancer    He will monitror bp and kewep me informd  He will keep apt with urogy and cardiology.,             Orders Placed This Encounter   Procedures    Comprehensive metabolic panel     Order Specific Question:   Release to patient     Answer:   Routine Release [[id number]]    Lipid Panel With / Chol / HDL Ratio     Order Specific Question:   Release to patient     Answer:   Routine Release [[id number]]    CBC & Differential     Order Specific Question:   Release to patient     Answer:   Routine Release [[id number]]       Follow up: 6 month(s)

## 2023-08-22 DIAGNOSIS — R73.9 ELEVATED BLOOD SUGAR: Primary | ICD-10-CM

## 2023-08-22 LAB
ALBUMIN SERPL-MCNC: 4.2 G/DL (ref 3.5–5.2)
ALBUMIN/GLOB SERPL: 2 G/DL
ALP SERPL-CCNC: 120 U/L (ref 39–117)
ALT SERPL-CCNC: 15 U/L (ref 1–41)
AST SERPL-CCNC: 16 U/L (ref 1–40)
BASOPHILS # BLD AUTO: 0.04 10*3/MM3 (ref 0–0.2)
BASOPHILS NFR BLD AUTO: 0.7 % (ref 0–1.5)
BILIRUB SERPL-MCNC: 0.4 MG/DL (ref 0–1.2)
BUN SERPL-MCNC: 19 MG/DL (ref 8–23)
BUN/CREAT SERPL: 16.7 (ref 7–25)
CALCIUM SERPL-MCNC: 8.9 MG/DL (ref 8.6–10.5)
CHLORIDE SERPL-SCNC: 105 MMOL/L (ref 98–107)
CHOLEST SERPL-MCNC: 138 MG/DL (ref 0–200)
CHOLEST/HDLC SERPL: 3.37 {RATIO}
CO2 SERPL-SCNC: 24.5 MMOL/L (ref 22–29)
CREAT SERPL-MCNC: 1.14 MG/DL (ref 0.76–1.27)
EGFRCR SERPLBLD CKD-EPI 2021: 66.7 ML/MIN/1.73
EOSINOPHIL # BLD AUTO: 0.17 10*3/MM3 (ref 0–0.4)
EOSINOPHIL NFR BLD AUTO: 3 % (ref 0.3–6.2)
ERYTHROCYTE [DISTWIDTH] IN BLOOD BY AUTOMATED COUNT: 12.8 % (ref 12.3–15.4)
GLOBULIN SER CALC-MCNC: 2.1 GM/DL
GLUCOSE SERPL-MCNC: 158 MG/DL (ref 65–99)
HBA1C MFR BLD: 6 % (ref 4.8–5.6)
HCT VFR BLD AUTO: 45.9 % (ref 37.5–51)
HDLC SERPL-MCNC: 41 MG/DL (ref 40–60)
HGB BLD-MCNC: 15.7 G/DL (ref 13–17.7)
IMM GRANULOCYTES # BLD AUTO: 0.03 10*3/MM3 (ref 0–0.05)
IMM GRANULOCYTES NFR BLD AUTO: 0.5 % (ref 0–0.5)
LDLC SERPL CALC-MCNC: 73 MG/DL (ref 0–100)
LYMPHOCYTES # BLD AUTO: 1 10*3/MM3 (ref 0.7–3.1)
LYMPHOCYTES NFR BLD AUTO: 17.5 % (ref 19.6–45.3)
MCH RBC QN AUTO: 30.1 PG (ref 26.6–33)
MCHC RBC AUTO-ENTMCNC: 34.2 G/DL (ref 31.5–35.7)
MCV RBC AUTO: 87.9 FL (ref 79–97)
MONOCYTES # BLD AUTO: 0.48 10*3/MM3 (ref 0.1–0.9)
MONOCYTES NFR BLD AUTO: 8.4 % (ref 5–12)
NEUTROPHILS # BLD AUTO: 3.99 10*3/MM3 (ref 1.7–7)
NEUTROPHILS NFR BLD AUTO: 69.9 % (ref 42.7–76)
NRBC BLD AUTO-RTO: 0 /100 WBC (ref 0–0.2)
PLATELET # BLD AUTO: 193 10*3/MM3 (ref 140–450)
POTASSIUM SERPL-SCNC: 3.9 MMOL/L (ref 3.5–5.2)
PROT SERPL-MCNC: 6.3 G/DL (ref 6–8.5)
RBC # BLD AUTO: 5.22 10*6/MM3 (ref 4.14–5.8)
SODIUM SERPL-SCNC: 140 MMOL/L (ref 136–145)
TRIGL SERPL-MCNC: 135 MG/DL (ref 0–150)
VLDLC SERPL CALC-MCNC: 24 MG/DL (ref 5–40)
WBC # BLD AUTO: 5.71 10*3/MM3 (ref 3.4–10.8)
WRITTEN AUTHORIZATION: NORMAL

## 2023-08-24 ENCOUNTER — HOSPITAL ENCOUNTER (OUTPATIENT)
Dept: ULTRASOUND IMAGING | Facility: HOSPITAL | Age: 78
Discharge: HOME | End: 2023-08-24
Payer: MEDICARE

## 2023-08-24 ENCOUNTER — HOSPITAL ENCOUNTER (OUTPATIENT)
Dept: CARDIOLOGY | Facility: HOSPITAL | Age: 78
Discharge: HOME | End: 2023-08-24
Payer: MEDICARE

## 2023-08-24 ENCOUNTER — HOSPITAL ENCOUNTER (OUTPATIENT)
Dept: CT IMAGING | Facility: HOSPITAL | Age: 78
Discharge: HOME | End: 2023-08-24
Payer: MEDICARE

## 2023-08-24 VITALS
BODY MASS INDEX: 30.29 KG/M2 | WEIGHT: 193 LBS | HEIGHT: 67 IN | DIASTOLIC BLOOD PRESSURE: 78 MMHG | SYSTOLIC BLOOD PRESSURE: 126 MMHG

## 2023-08-24 DIAGNOSIS — I79.8 OTHER DISORDERS OF ARTERIES, ARTERIOLES AND CAPILLARIES IN DISEASES CLASSIFIED ELSEWHERE: ICD-10-CM

## 2023-08-24 DIAGNOSIS — I25.119 CORONARY ARTERY DISEASE INVOLVING NATIVE CORONARY ARTERY OF NATIVE HEART WITH ANGINA PECTORIS: ICD-10-CM

## 2023-08-24 DIAGNOSIS — I25.118 ATHEROSCLEROTIC HEART DISEASE OF NATIVE CORONARY ARTERY WITH OTHER FORMS OF ANGINA PECTORIS: ICD-10-CM

## 2023-08-24 PROCEDURE — 93880 EXTRACRANIAL BILAT STUDY: CPT

## 2023-08-24 PROCEDURE — 71250 CT THORAX DX C-: CPT

## 2023-08-24 PROCEDURE — 93970 EXTREMITY STUDY: CPT

## 2023-08-24 PROCEDURE — 93306 TTE W/DOPPLER COMPLETE: CPT

## 2023-08-25 LAB
BH CV ECHO MEAS - AO MAX PG: 6.8 MMHG
BH CV ECHO MEAS - AO MEAN PG: 2.8 MMHG
BH CV ECHO MEAS - AO ROOT DIAM: 2.9 CM
BH CV ECHO MEAS - AO V2 MAX: 130 CM/SEC
BH CV ECHO MEAS - AO V2 VTI: 20 CM
BH CV ECHO MEAS - AVA(I,D): 3.3 CM2
BH CV ECHO MEAS - EDV(CUBED): 32.8 ML
BH CV ECHO MEAS - EDV(MOD-SP2): 60.8 ML
BH CV ECHO MEAS - EDV(MOD-SP4): 64.6 ML
BH CV ECHO MEAS - EF(MOD-BP): 61.3 %
BH CV ECHO MEAS - EF(MOD-SP2): 63.7 %
BH CV ECHO MEAS - EF(MOD-SP4): 56.2 %
BH CV ECHO MEAS - ESV(CUBED): 12.2 ML
BH CV ECHO MEAS - ESV(MOD-SP2): 22.1 ML
BH CV ECHO MEAS - ESV(MOD-SP4): 28.3 ML
BH CV ECHO MEAS - FS: 28.1 %
BH CV ECHO MEAS - IVS/LVPW: 1 CM
BH CV ECHO MEAS - IVSD: 1.2 CM
BH CV ECHO MEAS - LA DIMENSION: 3.6 CM
BH CV ECHO MEAS - LAT PEAK E' VEL: 10.9 CM/SEC
BH CV ECHO MEAS - LV DIASTOLIC VOL/BSA (35-75): 32.4 CM2
BH CV ECHO MEAS - LV MASS(C)D: 119.4 GRAMS
BH CV ECHO MEAS - LV MAX PG: 2.6 MMHG
BH CV ECHO MEAS - LV MEAN PG: 1 MMHG
BH CV ECHO MEAS - LV SYSTOLIC VOL/BSA (12-30): 14.2 CM2
BH CV ECHO MEAS - LV V1 MAX: 80.6 CM/SEC
BH CV ECHO MEAS - LV V1 VTI: 17.2 CM
BH CV ECHO MEAS - LVIDD: 3.2 CM
BH CV ECHO MEAS - LVIDS: 2.3 CM
BH CV ECHO MEAS - LVOT AREA: 3.8 CM2
BH CV ECHO MEAS - LVOT DIAM: 2.2 CM
BH CV ECHO MEAS - LVPWD: 1.2 CM
BH CV ECHO MEAS - MED PEAK E' VEL: 8.7 CM/SEC
BH CV ECHO MEAS - MV A MAX VEL: 77.6 CM/SEC
BH CV ECHO MEAS - MV DEC TIME: 0.19 MSEC
BH CV ECHO MEAS - MV E MAX VEL: 73.7 CM/SEC
BH CV ECHO MEAS - MV E/A: 0.95
BH CV ECHO MEAS - PA V2 MAX: 86.2 CM/SEC
BH CV ECHO MEAS - SI(MOD-SP2): 19.4 ML/M2
BH CV ECHO MEAS - SI(MOD-SP4): 18.2 ML/M2
BH CV ECHO MEAS - SV(LVOT): 65.4 ML
BH CV ECHO MEAS - SV(MOD-SP2): 38.7 ML
BH CV ECHO MEAS - SV(MOD-SP4): 36.3 ML
BH CV ECHO MEASUREMENTS AVERAGE E/E' RATIO: 7.52
BH CV XLRA - TDI S': 11.9 CM/SEC
LEFT ATRIUM VOLUME INDEX: 29.5 ML/M2
LEFT ATRIUM VOLUME: 58.7 ML

## 2023-08-28 ENCOUNTER — HOSPITAL ENCOUNTER (OUTPATIENT)
Dept: GENERAL RADIOLOGY | Facility: HOSPITAL | Age: 78
Discharge: HOME | End: 2023-08-28
Payer: MEDICARE

## 2023-08-28 ENCOUNTER — ANESTHESIA EVENT (OUTPATIENT)
Dept: PERIOP | Facility: HOSPITAL | Age: 78
DRG: 236 | End: 2023-08-28
Payer: MEDICARE

## 2023-08-28 ENCOUNTER — HOSPITAL ENCOUNTER (OUTPATIENT)
Dept: PULMONOLOGY | Facility: HOSPITAL | Age: 78
Discharge: HOME | End: 2023-08-28
Payer: MEDICARE

## 2023-08-28 ENCOUNTER — PRE-ADMISSION TESTING (OUTPATIENT)
Dept: PREADMISSION TESTING | Facility: HOSPITAL | Age: 78
DRG: 236 | End: 2023-08-28
Payer: MEDICARE

## 2023-08-28 VITALS
HEART RATE: 55 BPM | WEIGHT: 190.92 LBS | BODY MASS INDEX: 28.94 KG/M2 | OXYGEN SATURATION: 95 % | SYSTOLIC BLOOD PRESSURE: 141 MMHG | RESPIRATION RATE: 16 BRPM | HEIGHT: 68 IN | DIASTOLIC BLOOD PRESSURE: 76 MMHG

## 2023-08-28 DIAGNOSIS — R06.02 SOB (SHORTNESS OF BREATH): ICD-10-CM

## 2023-08-28 DIAGNOSIS — I25.119 CORONARY ARTERY DISEASE INVOLVING NATIVE CORONARY ARTERY OF NATIVE HEART WITH ANGINA PECTORIS: ICD-10-CM

## 2023-08-28 DIAGNOSIS — R73.9 HYPERGLYCEMIA: ICD-10-CM

## 2023-08-28 LAB
ABO GROUP BLD: NORMAL
ALBUMIN SERPL-MCNC: 4.5 G/DL (ref 3.5–5.2)
ALBUMIN/GLOB SERPL: 1.8 G/DL
ALP SERPL-CCNC: 123 U/L (ref 39–117)
ALT SERPL W P-5'-P-CCNC: 15 U/L (ref 1–41)
ANION GAP SERPL CALCULATED.3IONS-SCNC: 10 MMOL/L (ref 5–15)
APTT PPP: 29.2 SECONDS (ref 24.1–35)
ARTERIAL PATENCY WRIST A: NORMAL
AST SERPL-CCNC: 18 U/L (ref 1–40)
ATMOSPHERIC PRESS: 748 MMHG
BASE EXCESS BLDA CALC-SCNC: 0.2 MMOL/L (ref 0–2)
BASOPHILS # BLD AUTO: 0.04 10*3/MM3 (ref 0–0.2)
BASOPHILS NFR BLD AUTO: 0.7 % (ref 0–1.5)
BDY SITE: NORMAL
BILIRUB SERPL-MCNC: 0.6 MG/DL (ref 0–1.2)
BLD GP AB SCN SERPL QL: NEGATIVE
BODY TEMPERATURE: 37 C
BUN SERPL-MCNC: 14 MG/DL (ref 8–23)
BUN/CREAT SERPL: 13.3 (ref 7–25)
CALCIUM SPEC-SCNC: 9.1 MG/DL (ref 8.6–10.5)
CHLORIDE SERPL-SCNC: 105 MMOL/L (ref 98–107)
CO2 SERPL-SCNC: 27 MMOL/L (ref 22–29)
CREAT SERPL-MCNC: 1.05 MG/DL (ref 0.76–1.27)
DEPRECATED RDW RBC AUTO: 40 FL (ref 37–54)
EGFRCR SERPLBLD CKD-EPI 2021: 73.6 ML/MIN/1.73
EOSINOPHIL # BLD AUTO: 0.12 10*3/MM3 (ref 0–0.4)
EOSINOPHIL NFR BLD AUTO: 2 % (ref 0.3–6.2)
ERYTHROCYTE [DISTWIDTH] IN BLOOD BY AUTOMATED COUNT: 12.5 % (ref 12.3–15.4)
GLOBULIN UR ELPH-MCNC: 2.5 GM/DL
GLUCOSE SERPL-MCNC: 82 MG/DL (ref 65–99)
HBA1C MFR BLD: 5.8 % (ref 4.8–5.6)
HCO3 BLDA-SCNC: 24.6 MMOL/L (ref 20–26)
HCT VFR BLD AUTO: 46.5 % (ref 37.5–51)
HGB BLD-MCNC: 15.7 G/DL (ref 13–17.7)
IMM GRANULOCYTES # BLD AUTO: 0.02 10*3/MM3 (ref 0–0.05)
IMM GRANULOCYTES NFR BLD AUTO: 0.3 % (ref 0–0.5)
INR PPP: 1.04 (ref 0.91–1.09)
LYMPHOCYTES # BLD AUTO: 1.21 10*3/MM3 (ref 0.7–3.1)
LYMPHOCYTES NFR BLD AUTO: 20 % (ref 19.6–45.3)
Lab: NORMAL
MCH RBC QN AUTO: 29.5 PG (ref 26.6–33)
MCHC RBC AUTO-ENTMCNC: 33.8 G/DL (ref 31.5–35.7)
MCV RBC AUTO: 87.2 FL (ref 79–97)
MODALITY: NORMAL
MONOCYTES # BLD AUTO: 0.8 10*3/MM3 (ref 0.1–0.9)
MONOCYTES NFR BLD AUTO: 13.2 % (ref 5–12)
NEUTROPHILS NFR BLD AUTO: 3.86 10*3/MM3 (ref 1.7–7)
NEUTROPHILS NFR BLD AUTO: 63.8 % (ref 42.7–76)
NRBC BLD AUTO-RTO: 0 /100 WBC (ref 0–0.2)
PCO2 BLDA: 38.4 MM HG (ref 35–45)
PCO2 TEMP ADJ BLD: 38.4 MM HG (ref 35–45)
PH BLDA: 7.42 PH UNITS (ref 7.35–7.45)
PH, TEMP CORRECTED: 7.42 PH UNITS (ref 7.35–7.45)
PLATELET # BLD AUTO: 210 10*3/MM3 (ref 140–450)
PMV BLD AUTO: 11.1 FL (ref 6–12)
PO2 BLDA: 88.9 MM HG (ref 83–108)
PO2 TEMP ADJ BLD: 88.9 MM HG (ref 83–108)
POTASSIUM SERPL-SCNC: 4.3 MMOL/L (ref 3.5–5.2)
PROT SERPL-MCNC: 7 G/DL (ref 6–8.5)
PROTHROMBIN TIME: 13.7 SECONDS (ref 11.8–14.8)
RBC # BLD AUTO: 5.33 10*6/MM3 (ref 4.14–5.8)
RH BLD: POSITIVE
SAO2 % BLDCOA: 97.7 % (ref 94–99)
SODIUM SERPL-SCNC: 142 MMOL/L (ref 136–145)
T&S EXPIRATION DATE: NORMAL
VENTILATOR MODE: NORMAL
WBC NRBC COR # BLD: 6.05 10*3/MM3 (ref 3.4–10.8)

## 2023-08-28 PROCEDURE — 36600 WITHDRAWAL OF ARTERIAL BLOOD: CPT

## 2023-08-28 PROCEDURE — 80053 COMPREHEN METABOLIC PANEL: CPT

## 2023-08-28 PROCEDURE — 85730 THROMBOPLASTIN TIME PARTIAL: CPT

## 2023-08-28 PROCEDURE — 71046 X-RAY EXAM CHEST 2 VIEWS: CPT

## 2023-08-28 PROCEDURE — 86901 BLOOD TYPING SEROLOGIC RH(D): CPT

## 2023-08-28 PROCEDURE — 85610 PROTHROMBIN TIME: CPT

## 2023-08-28 PROCEDURE — 83036 HEMOGLOBIN GLYCOSYLATED A1C: CPT

## 2023-08-28 PROCEDURE — 85025 COMPLETE CBC W/AUTO DIFF WBC: CPT

## 2023-08-28 PROCEDURE — 36415 COLL VENOUS BLD VENIPUNCTURE: CPT

## 2023-08-28 PROCEDURE — 93005 ELECTROCARDIOGRAM TRACING: CPT

## 2023-08-28 PROCEDURE — 82803 BLOOD GASES ANY COMBINATION: CPT

## 2023-08-28 PROCEDURE — 86900 BLOOD TYPING SEROLOGIC ABO: CPT

## 2023-08-28 PROCEDURE — 93010 ELECTROCARDIOGRAM REPORT: CPT | Performed by: EMERGENCY MEDICINE

## 2023-08-28 PROCEDURE — 86920 COMPATIBILITY TEST SPIN: CPT

## 2023-08-28 PROCEDURE — 86850 RBC ANTIBODY SCREEN: CPT

## 2023-08-28 NOTE — DISCHARGE INSTRUCTIONS
Before you come to the hospital        Arrival time: AS DIRECTED BY OFFICE     YOU MAY TAKE THE FOLLOWING MEDICATION(S) THE MORNING OF SURGERY WITH A SIP OF WATER: NO MEDS MORNING OF SURGERY PER DR MCARTHUR ORDER           ALL OTHER HOME MEDICATION CHECK WITH YOUR PHYSICIAN (especially if   you are taking diabetes medicines or blood thinners)    Do not take any Erectile Dysfunction medications (EX: CIALIS, VIAGRA) 24 hours prior to surgery.      If you were given and instructed to use a germ- killing soap, use as directed the night before surgery and again the morning of surgery or as directed by your surgeon. (Use one-half of the bottle with each shower.)   See attached information for How to Use Chlorhexidine for Bathing if applicable.            Eating and drinking restrictions prior to scheduled arrival time    2 Hours before arrival time STOP   Drinking Clear liquids (water, black coffee-NO CREAM,  apple juice-no pulp)      8 Hours before arrival time STOP   All food, full liquids, and dairy products    (It is extremely important that you follow these guidelines to prevent delay or cancelation of your procedure)     Clear Liquids  Water and flavored water                                                                      Clear Fruit juices, such as cranberry juice and apple juice.  Black coffee (NO cream of any kind, including powdered).  Plain tea  Clear bouillon or broth.  Flavored gelatin.  Soda.  Gatorade or Powerade.  Full liquid examples  Juices that have pulp.  Frozen ice pops that contain fruit pieces.  Coffee with creamer  Milk.  Yogurt.                MANAGING PAIN AFTER SURGERY    We know you are probably wondering what your pain will be like after surgery.  Following surgery it is unrealistic to expect you will not have pain.   Pain is how our bodies let us know that something is wrong or cautions us to be careful.  That said, our goal is to make your pain tolerable.    Methods we may use to treat  your pain include (oral or IV medications, PCAs, epidurals, nerve blocks, etc.)   While some procedures require IV pain medications for a short time after surgery, transitioning to pain medications by mouth allows for better management of pain.   Your nurse will encourage you to take oral pain medications whenever possible.  IV medications work almost immediately, but only last a short while.  Taking medications by mouth allows for a more constant level of medication in your blood stream for a longer period of time.      Once your pain is out of control it is harder to get back under control.  It is important you are aware when your next dose of pain medication is due.  If you are admitted, your nurse may write the time of your next dose on the white board in your room to help you remember.      We are interested in your pain and encourage you to inform us about aggravating factors during your visit.   Many times a simple repositioning every few hours can make a big difference.    If your physician says it is okay, do not let your pain prevent you from getting out of bed. Be sure to call your nurse for assistance prior to getting up so you do not fall.      Before surgery, please decide your tolerable pain goal.  These faces help describe the pain ratings we use on a 0-10 scale.   Be prepared to tell us your goal and whether or not you take pain or anxiety medications at home.          Preparing for Surgery  Preparing for surgery is an important part of your care. It can make things go more smoothly and help you avoid complications. The steps leading up to surgery may vary among hospitals. Follow all instructions given to you by your health care providers. Ask questions if you do not understand something. Talk about any concerns that you have.  Here are some questions to consider asking before your surgery:  If my surgery is not an emergency (is elective), when would be the best time to have the surgery?  What  arrangements do I need to make for work, home, or school?  What will my recovery be like? How long will it be before I can return to normal activities?  Will I need to prepare my home? Will I need to arrange care for me or my children?  Should I expect to have pain after surgery? What are my pain management options? Are there nonmedical options that I can try for pain?  Tell a health care provider about:  Any allergies you have.  All medicines you are taking, including vitamins, herbs, eye drops, creams, and over-the-counter medicines.  Any problems you or family members have had with anesthetic medicines.  Any blood disorders you have.  Any surgeries you have had.  Any medical conditions you have.  Whether you are pregnant or may be pregnant.  What are the risks?  The risks and complications of surgery depend on the specific procedure that you have. Discuss all the risks with your health care providers before your surgery. Ask about common surgical complications, which may include:  Infection.  Bleeding or a need for blood replacement (transfusion).  Allergic reactions to medicines.  Damage to surrounding nerves, tissues, or structures.  A blood clot.  Scarring.  Failure of the surgery to correct the problem.  Follow these instructions before the procedure:  Several days or weeks before your procedure  You may have a physical exam by your primary health care provider to make sure it is safe for you to have surgery.  You may have testing. This may include a chest X-ray, blood and urine tests, electrocardiogram (ECG), or other testing.  Ask your health care provider about:  Changing or stopping your regular medicines. This is especially important if you are taking diabetes medicines or blood thinners.  Taking medicines such as aspirin and ibuprofen. These medicines can thin your blood. Do not take these medicines unless your health care provider tells you to take them.  Taking over-the-counter medicines, vitamins,  herbs, and supplements.  Do not use any products that contain nicotine or tobacco, such as cigarettes and e-cigarettes. If you need help quitting, ask your health care provider.  Avoid alcohol.  Ask your health care provider if there are exercises you can do to prepare for surgery.  Eat a healthy diet.   Plan to have someone 18 years of age or older to take you home from the hospital. We will need to verify your ride on the morning of surgery if you are being discharged home on the same day. Tell your ride to be expecting a call from the hospital prior to your procedure.   Plan to have a responsible adult care for you for at least 24 hours after you leave the hospital or clinic. This is important.  The day before your procedure  You may be given antibiotic medicine to take by mouth to help prevent infection. Take it as told by your health care provider.  You may be asked to shower with a germ-killing soap.  Follow instructions from your health care provider about eating and drinking restrictions. This includes gum, mints and hard candy.  Pack comfortable clothes according to your procedure.   The day of your procedure  You may need to take another shower with a germ-killing soap before you leave home in the morning.  With a small sip of water, take only the medicines that you are told to take.  Remove all jewelry including rings.   Leave anything you consider valuable at home except hearing aids if needed.  You do not need to bring your home medications into the hospital.   Do not wear any makeup, nail polish, powder, deodorant, lotion, hair accessories, or anything on your skin or body except your clothes.  If you will be staying in the hospital, bring a case to hold your glasses, contacts, or dentures. You may also want to bring your robe and non-skid footwear.       (Do not use denture adhesives since you will be asked to remove them during  surgery).   If you wear oxygen at home, bring it with you the day of  surgery.  If instructed by your health care provider, bring your sleep apnea device with you on the day of your surgery (if this applies to you).  You may want to leave your suitcase and sleep apnea device in the car until after surgery.   Arrive at the hospital as scheduled.  Bring a friend or family member with you who can help to answer questions and be present while you meet with your health care provider.  At the hospital  When you arrive at the hospital:  Go to registration located at the main entrance of the hospital. You will be registered and given a beeper and a sticker sheet. Take the stickers to the Outpatient nurses desk and place in the black tray. This is to notify staff that you have arrived. Then return to the lobby to wait.   When your beeper lights up and vibrates proceed through the double doors, under the stairs, and a member of the Outpatient Surgery staff will escort you to your preoperative room.  You may have to wear compression sleeves. These help to prevent blood clots and reduce swelling in your legs.  An IV may be inserted into one of your veins.              In the operating room, you may be given one or more of the following:        A medicine to help you relax (sedative).        A medicine to numb the area (local anesthetic).        A medicine to make you fall asleep (general anesthetic).        A medicine that is injected into an area of your body to numb everything below the                      injection site (regional anesthetic).  You may be given an antibiotic through your IV to help prevent infection.  Your surgical site will be marked or identified.    Contact a health care provider if you:  Develop a fever of more than 100.4øF (38øC) or other feelings of illness during the 48 hours before your surgery.  Have symptoms that get worse.  Have questions or concerns about your surgery.  Summary  Preparing for surgery can make the procedure go more smoothly and lower your risk of  complications.  Before surgery, make a list of questions and concerns to discuss with your surgeon. Ask about the risks and possible complications.  In the days or weeks before your surgery, follow all instructions from your health care provider. You may need to stop smoking, avoid alcohol, follow eating restrictions, and change or stop your regular medicines.  Contact your surgeon if you develop a fever or other signs of illness during the few days before your surgery.  This information is not intended to replace advice given to you by your health care provider. Make sure you discuss any questions you have with your health care provider.  Document Revised: 12/21/2018 Document Reviewed: 10/23/2018  Elsevier Patient Education c 2021 [employer]

## 2023-08-28 NOTE — ANESTHESIA PREPROCEDURE EVALUATION
Anesthesia Evaluation     Patient summary reviewed and Nursing notes reviewed   history of anesthetic complications:  PONV               Airway   Mallampati: II  TM distance: >3 FB  Neck ROM: full  No difficulty expected  Dental          Pulmonary    (+) a smoker (quit 1984) Former,  (-) asthma, sleep apnea  Cardiovascular   Exercise tolerance: good (4-7 METS)    (+) hypertension, valvular problems/murmurs murmur, CAD, angina with exertion, hyperlipidemia    ROS comment: Cath 7/24/23  mpression:  1. Severe distal left main coronary artery disease, as well as long segment of moderate to severe stenoses throughout OM1.  2. Normal LVEF (greater than 60%), with mildly elevated EDP (17 mmHg       Echo 8/25/23  ú  Left ventricular systolic function is normal. Left ventricular ejection fraction appears to be 61 - 65%.  ú  Left ventricular wall thickness is consistent with mild concentric hypertrophy.  ú  Normal right ventricular cavity size and systolic function noted  ú  Trace aortic valve regurgitation is present.  ú  Trace mitral valve regurgitation is present.         Neuro/Psych  (-) seizures, TIA, CVA  GI/Hepatic/Renal/Endo    (+) GERD, renal disease stones  (-) liver disease, diabetes    Musculoskeletal     Abdominal    Substance History      OB/GYN          Other      history of cancer (prostate cancer) remission                    Anesthesia Plan    ASA 4     general     (No c/I to YINKA)  intravenous induction     Anesthetic plan, risks, benefits, and alternatives have been provided, discussed and informed consent has been obtained with: patient.      CODE STATUS:

## 2023-08-28 NOTE — TELEPHONE ENCOUNTER
Per Heiek YATES, new OR arrival time of 0700 instead of 0830. [place] in Surgery Scheduling notified and patient notified - voiced understanding.

## 2023-08-29 ENCOUNTER — ANESTHESIA (OUTPATIENT)
Dept: PERIOP | Facility: HOSPITAL | Age: 78
DRG: 236 | End: 2023-08-29
Payer: MEDICARE

## 2023-08-29 ENCOUNTER — HOSPITAL ENCOUNTER (INPATIENT)
Facility: HOSPITAL | Age: 78
LOS: 5 days | Discharge: HOME | DRG: 236 | End: 2023-09-03
Attending: SURGERY | Admitting: SURGERY
Payer: MEDICARE

## 2023-08-29 ENCOUNTER — APPOINTMENT (OUTPATIENT)
Dept: CARDIOLOGY | Facility: HOSPITAL | Age: 78
DRG: 236 | End: 2023-08-29
Payer: MEDICARE

## 2023-08-29 ENCOUNTER — APPOINTMENT (OUTPATIENT)
Dept: GENERAL RADIOLOGY | Facility: HOSPITAL | Age: 78
DRG: 236 | End: 2023-08-29
Payer: MEDICARE

## 2023-08-29 DIAGNOSIS — I25.119 CORONARY ARTERY DISEASE INVOLVING NATIVE CORONARY ARTERY OF NATIVE HEART WITH ANGINA PECTORIS: ICD-10-CM

## 2023-08-29 DIAGNOSIS — Z74.09 IMPAIRED MOBILITY: Primary | ICD-10-CM

## 2023-08-29 DIAGNOSIS — I25.10 CORONARY ARTERY DISEASE INVOLVING NATIVE CORONARY ARTERY OF NATIVE HEART WITHOUT ANGINA PECTORIS: ICD-10-CM

## 2023-08-29 LAB
A-A DO2: 259.5 MMHG
ALBUMIN SERPL-MCNC: 3.1 G/DL (ref 3.5–5.2)
ALBUMIN SERPL-MCNC: 4 G/DL (ref 3.5–5.2)
ANION GAP SERPL CALCULATED.3IONS-SCNC: 9 MMOL/L (ref 5–15)
ANION GAP SERPL CALCULATED.3IONS-SCNC: 9 MMOL/L (ref 5–15)
APTT PPP: 37.4 SECONDS (ref 24.1–35)
ARTERIAL PATENCY WRIST A: ABNORMAL
ARTERIAL PATENCY WRIST A: NORMAL
ATMOSPHERIC PRESS: 747 MMHG
ATMOSPHERIC PRESS: 747 MMHG
ATMOSPHERIC PRESS: 748 MMHG
ATMOSPHERIC PRESS: 749 MMHG
BASE EXCESS BLDA CALC-SCNC: -0.2 MMOL/L (ref 0–2)
BASE EXCESS BLDA CALC-SCNC: -0.9 MMOL/L (ref 0–2)
BASE EXCESS BLDA CALC-SCNC: -4.3 MMOL/L (ref 0–2)
BASE EXCESS BLDA CALC-SCNC: 1.3 MMOL/L (ref 0–2)
BASE EXCESS BLDA CALC-SCNC: 1.5 MMOL/L (ref 0–2)
BASE EXCESS BLDA CALC-SCNC: 2.1 MMOL/L (ref 0–2)
BASE EXCESS BLDA CALC-SCNC: 3.5 MMOL/L (ref 0–2)
BASOPHILS # BLD AUTO: 0.03 10*3/MM3 (ref 0–0.2)
BASOPHILS NFR BLD AUTO: 0.3 % (ref 0–1.5)
BDY SITE: ABNORMAL
BDY SITE: NORMAL
BODY TEMPERATURE: 37 C
BUN SERPL-MCNC: 13 MG/DL (ref 8–23)
BUN SERPL-MCNC: 13 MG/DL (ref 8–23)
BUN/CREAT SERPL: 14.6 (ref 7–25)
BUN/CREAT SERPL: 14.8 (ref 7–25)
CA-I BLD-MCNC: 3.74 MG/DL (ref 4.6–5.4)
CA-I BLD-MCNC: 4.3 MG/DL (ref 4.6–5.4)
CA-I BLD-MCNC: 4.34 MG/DL (ref 4.6–5.4)
CA-I BLD-MCNC: 4.6 MG/DL (ref 4.6–5.4)
CA-I BLD-MCNC: 4.71 MG/DL (ref 4.6–5.4)
CALCIUM SPEC-SCNC: 8 MG/DL (ref 8.6–10.5)
CALCIUM SPEC-SCNC: 8.2 MG/DL (ref 8.6–10.5)
CHLORIDE SERPL-SCNC: 108 MMOL/L (ref 98–107)
CHLORIDE SERPL-SCNC: 110 MMOL/L (ref 98–107)
CO2 SERPL-SCNC: 24 MMOL/L (ref 22–29)
CO2 SERPL-SCNC: 24 MMOL/L (ref 22–29)
COHGB MFR BLD: 0.7 % (ref 0–5)
COHGB MFR BLD: 0.7 % (ref 0–5)
COHGB MFR BLD: 0.8 % (ref 0–5)
COHGB MFR BLD: 0.8 % (ref 0–5)
COHGB MFR BLD: 1.3 % (ref 0–5)
CPAP: 5 CMH2O
CREAT SERPL-MCNC: 0.88 MG/DL (ref 0.76–1.27)
CREAT SERPL-MCNC: 0.89 MG/DL (ref 0.76–1.27)
D-LACTATE SERPL-SCNC: 1.6 MMOL/L (ref 0.5–2)
D-LACTATE SERPL-SCNC: 2.2 MMOL/L (ref 0.5–2)
DEPRECATED RDW RBC AUTO: 38.8 FL (ref 37–54)
DEPRECATED RDW RBC AUTO: 39.2 FL (ref 37–54)
EGFRCR SERPLBLD CKD-EPI 2021: 88.8 ML/MIN/1.73
EGFRCR SERPLBLD CKD-EPI 2021: 89.1 ML/MIN/1.73
EOSINOPHIL # BLD AUTO: 0.18 10*3/MM3 (ref 0–0.4)
EOSINOPHIL NFR BLD AUTO: 1.7 % (ref 0.3–6.2)
ERYTHROCYTE [DISTWIDTH] IN BLOOD BY AUTOMATED COUNT: 12.4 % (ref 12.3–15.4)
ERYTHROCYTE [DISTWIDTH] IN BLOOD BY AUTOMATED COUNT: 12.7 % (ref 12.3–15.4)
FIBRINOGEN PPP-MCNC: 254 MG/DL (ref 240–460)
GLUCOSE BLDC GLUCOMTR-MCNC: 126 MG/DL (ref 70–130)
GLUCOSE BLDC GLUCOMTR-MCNC: 129 MG/DL (ref 70–130)
GLUCOSE BLDC GLUCOMTR-MCNC: 136 MG/DL (ref 70–130)
GLUCOSE BLDC GLUCOMTR-MCNC: 157 MG/DL (ref 70–130)
GLUCOSE BLDC GLUCOMTR-MCNC: 158 MG/DL (ref 70–130)
GLUCOSE BLDC GLUCOMTR-MCNC: 163 MG/DL (ref 70–130)
GLUCOSE BLDC GLUCOMTR-MCNC: 166 MG/DL (ref 70–130)
GLUCOSE BLDC GLUCOMTR-MCNC: 172 MG/DL (ref 70–130)
GLUCOSE BLDC GLUCOMTR-MCNC: 181 MG/DL (ref 70–130)
GLUCOSE SERPL-MCNC: 125 MG/DL (ref 65–99)
GLUCOSE SERPL-MCNC: 145 MG/DL (ref 65–99)
HCO3 BLDA-SCNC: 21.8 MMOL/L (ref 20–26)
HCO3 BLDA-SCNC: 24.6 MMOL/L (ref 20–26)
HCO3 BLDA-SCNC: 24.7 MMOL/L (ref 20–26)
HCO3 BLDA-SCNC: 25.8 MMOL/L (ref 20–26)
HCO3 BLDA-SCNC: 26 MMOL/L (ref 20–26)
HCO3 BLDA-SCNC: 26.1 MMOL/L (ref 20–26)
HCO3 BLDA-SCNC: 27.7 MMOL/L (ref 20–26)
HCT VFR BLD AUTO: 27.9 % (ref 37.5–51)
HCT VFR BLD AUTO: 34.6 % (ref 37.5–51)
HCT VFR BLD CALC: 27.6 % (ref 38–51)
HCT VFR BLD CALC: 29.6 % (ref 38–51)
HCT VFR BLD CALC: 38.5 % (ref 38–51)
HCT VFR BLD CALC: 40.1 % (ref 38–51)
HCT VFR BLD CALC: 40.3 % (ref 38–51)
HGB BLD-MCNC: 12 G/DL (ref 13–17.7)
HGB BLD-MCNC: 9.6 G/DL (ref 13–17.7)
HGB BLDA-MCNC: 12.6 G/DL (ref 14–18)
HGB BLDA-MCNC: 13.1 G/DL (ref 14–18)
HGB BLDA-MCNC: 13.2 G/DL (ref 14–18)
HGB BLDA-MCNC: 9 G/DL (ref 14–18)
HGB BLDA-MCNC: 9.7 G/DL (ref 14–18)
IMM GRANULOCYTES # BLD AUTO: 0.06 10*3/MM3 (ref 0–0.05)
IMM GRANULOCYTES NFR BLD AUTO: 0.6 % (ref 0–0.5)
INHALED O2 CONCENTRATION: 100 %
INHALED O2 CONCENTRATION: 30 %
INHALED O2 CONCENTRATION: 40 %
INHALED O2 CONCENTRATION: 60 %
INR PPP: 1.25 (ref 0.91–1.09)
LYMPHOCYTES # BLD AUTO: 1.71 10*3/MM3 (ref 0.7–3.1)
LYMPHOCYTES NFR BLD AUTO: 16.3 % (ref 19.6–45.3)
Lab: ABNORMAL
Lab: NORMAL
Lab: NORMAL
MCH RBC QN AUTO: 29.4 PG (ref 26.6–33)
MCH RBC QN AUTO: 29.6 PG (ref 26.6–33)
MCHC RBC AUTO-ENTMCNC: 34.4 G/DL (ref 31.5–35.7)
MCHC RBC AUTO-ENTMCNC: 34.7 G/DL (ref 31.5–35.7)
MCV RBC AUTO: 85.4 FL (ref 79–97)
MCV RBC AUTO: 85.6 FL (ref 79–97)
METHGB BLD QL: 0.3 % (ref 0–3)
METHGB BLD QL: 0.6 % (ref 0–3)
METHGB BLD QL: 0.8 % (ref 0–3)
METHGB BLD QL: 0.9 % (ref 0–3)
METHGB BLD QL: 0.9 % (ref 0–3)
MODALITY: ABNORMAL
MODALITY: NORMAL
MONOCYTES # BLD AUTO: 1.02 10*3/MM3 (ref 0.1–0.9)
MONOCYTES NFR BLD AUTO: 9.7 % (ref 5–12)
NEUTROPHILS NFR BLD AUTO: 7.49 10*3/MM3 (ref 1.7–7)
NEUTROPHILS NFR BLD AUTO: 71.4 % (ref 42.7–76)
NRBC BLD AUTO-RTO: 0 /100 WBC (ref 0–0.2)
OXYHGB MFR BLDV: 97.8 % (ref 94–99)
OXYHGB MFR BLDV: 98.6 % (ref 94–99)
OXYHGB MFR BLDV: 98.7 % (ref 94–99)
OXYHGB MFR BLDV: 98.8 % (ref 94–99)
OXYHGB MFR BLDV: 99.2 % (ref 94–99)
PCO2 BLDA: 36.2 MM HG (ref 35–45)
PCO2 BLDA: 39.6 MM HG (ref 35–45)
PCO2 BLDA: 39.9 MM HG (ref 35–45)
PCO2 BLDA: 40 MM HG (ref 35–45)
PCO2 BLDA: 40.7 MM HG (ref 35–45)
PCO2 BLDA: 43.5 MM HG (ref 35–45)
PCO2 BLDA: 43.6 MM HG (ref 35–45)
PCO2 TEMP ADJ BLD: 36.2 MM HG (ref 35–45)
PCO2 TEMP ADJ BLD: 39.6 MM HG (ref 35–45)
PCO2 TEMP ADJ BLD: 39.9 MM HG (ref 35–45)
PCO2 TEMP ADJ BLD: 40 MM HG (ref 35–45)
PCO2 TEMP ADJ BLD: 40.7 MM HG (ref 35–45)
PCO2 TEMP ADJ BLD: 43.5 MM HG (ref 35–45)
PCO2 TEMP ADJ BLD: 43.6 MM HG (ref 35–45)
PEEP RESPIRATORY: 10 CM[H2O]
PEEP RESPIRATORY: 10 CM[H2O]
PH BLDA: 7.31 PH UNITS (ref 7.35–7.45)
PH BLDA: 7.36 PH UNITS (ref 7.35–7.45)
PH BLDA: 7.4 PH UNITS (ref 7.35–7.45)
PH BLDA: 7.41 PH UNITS (ref 7.35–7.45)
PH BLDA: 7.42 PH UNITS (ref 7.35–7.45)
PH BLDA: 7.45 PH UNITS (ref 7.35–7.45)
PH BLDA: 7.46 PH UNITS (ref 7.35–7.45)
PH, TEMP CORRECTED: 7.31 PH UNITS (ref 7.35–7.45)
PH, TEMP CORRECTED: 7.36 PH UNITS (ref 7.35–7.45)
PH, TEMP CORRECTED: 7.4 PH UNITS (ref 7.35–7.45)
PH, TEMP CORRECTED: 7.41 PH UNITS (ref 7.35–7.45)
PH, TEMP CORRECTED: 7.42 PH UNITS (ref 7.35–7.45)
PH, TEMP CORRECTED: 7.45 PH UNITS (ref 7.35–7.45)
PH, TEMP CORRECTED: 7.46 PH UNITS (ref 7.35–7.45)
PHOSPHATE SERPL-MCNC: 1.9 MG/DL (ref 2.5–4.5)
PHOSPHATE SERPL-MCNC: 2.5 MG/DL (ref 2.5–4.5)
PLATELET # BLD AUTO: 106 10*3/MM3 (ref 140–450)
PLATELET # BLD AUTO: 145 10*3/MM3 (ref 140–450)
PMV BLD AUTO: 11.2 FL (ref 6–12)
PMV BLD AUTO: 11.4 FL (ref 6–12)
PO2 BLDA: 119 MM HG (ref 83–108)
PO2 BLDA: 469 MM HG (ref 83–108)
PO2 BLDA: 486 MM HG (ref 83–108)
PO2 BLDA: 493 MM HG (ref 83–108)
PO2 BLDA: 521 MM HG (ref 83–108)
PO2 BLDA: 90.9 MM HG (ref 83–108)
PO2 BLDA: 93.3 MM HG (ref 83–108)
PO2 TEMP ADJ BLD: 119 MM HG (ref 83–108)
PO2 TEMP ADJ BLD: 469 MM HG (ref 83–108)
PO2 TEMP ADJ BLD: 486 MM HG (ref 83–108)
PO2 TEMP ADJ BLD: 493 MM HG (ref 83–108)
PO2 TEMP ADJ BLD: 521 MM HG (ref 83–108)
PO2 TEMP ADJ BLD: 90.9 MM HG (ref 83–108)
PO2 TEMP ADJ BLD: 93.3 MM HG (ref 83–108)
POTASSIUM BLDA-SCNC: 3.5 MMOL/L (ref 3.5–5.2)
POTASSIUM BLDA-SCNC: 4 MMOL/L (ref 3.5–5.2)
POTASSIUM BLDA-SCNC: 4.1 MMOL/L (ref 3.5–5.2)
POTASSIUM BLDA-SCNC: 4.5 MMOL/L (ref 3.5–5.2)
POTASSIUM BLDA-SCNC: 4.8 MMOL/L (ref 3.5–5.2)
POTASSIUM SERPL-SCNC: 3.6 MMOL/L (ref 3.5–5.2)
POTASSIUM SERPL-SCNC: 4.3 MMOL/L (ref 3.5–5.2)
PROTHROMBIN TIME: 15.9 SECONDS (ref 11.8–14.8)
PSV: 10 CMH2O
QT INTERVAL: 434 MS
QTC INTERVAL: 400 MS
RBC # BLD AUTO: 3.26 10*6/MM3 (ref 4.14–5.8)
RBC # BLD AUTO: 4.05 10*6/MM3 (ref 4.14–5.8)
SAO2 % BLDCOA: 97.7 % (ref 94–99)
SAO2 % BLDCOA: 98.1 % (ref 94–99)
SAO2 % BLDCOA: 99.4 % (ref 94–99)
SAO2 % BLDCOA: >100.1 % (ref 94–99)
SET MECH RESP RATE: 20
SET MECH RESP RATE: 20
SODIUM BLDA-SCNC: 138 MMOL/L (ref 136–145)
SODIUM BLDA-SCNC: 140 MMOL/L (ref 136–145)
SODIUM BLDA-SCNC: 141 MMOL/L (ref 136–145)
SODIUM SERPL-SCNC: 141 MMOL/L (ref 136–145)
SODIUM SERPL-SCNC: 143 MMOL/L (ref 136–145)
VENTILATOR MODE: ABNORMAL
VENTILATOR MODE: NORMAL
VT ON VENT VENT: 550 ML
VT ON VENT VENT: 550 ML
WBC NRBC COR # BLD: 10.49 10*3/MM3 (ref 3.4–10.8)
WBC NRBC COR # BLD: 5.3 10*3/MM3 (ref 3.4–10.8)

## 2023-08-29 PROCEDURE — 94799 UNLISTED PULMONARY SVC/PX: CPT

## 2023-08-29 PROCEDURE — 0 BUPIVACAINE LIPOSOME 1.3 % SUSPENSION 20 ML VIAL: Performed by: SURGERY

## 2023-08-29 PROCEDURE — 25010000002 SUGAMMADEX 200 MG/2ML SOLUTION: Performed by: NURSE ANESTHETIST, CERTIFIED REGISTERED

## 2023-08-29 PROCEDURE — 85384 FIBRINOGEN ACTIVITY: CPT | Performed by: SURGERY

## 2023-08-29 PROCEDURE — 25010000002 MIDAZOLAM PER 1 MG: Performed by: ANESTHESIOLOGY

## 2023-08-29 PROCEDURE — 25010000002 MANNITOL PER 50 ML

## 2023-08-29 PROCEDURE — A4648 IMPLANTABLE TISSUE MARKER: HCPCS | Performed by: SURGERY

## 2023-08-29 PROCEDURE — 93318 ECHO TRANSESOPHAGEAL INTRAOP: CPT | Performed by: NURSE ANESTHETIST, CERTIFIED REGISTERED

## 2023-08-29 PROCEDURE — 25010000002 PROTAMINE SULFATE PER 10 MG

## 2023-08-29 PROCEDURE — 83605 ASSAY OF LACTIC ACID: CPT | Performed by: SURGERY

## 2023-08-29 PROCEDURE — 82948 REAGENT STRIP/BLOOD GLUCOSE: CPT

## 2023-08-29 PROCEDURE — 25010000002 HEPARIN (PORCINE) PER 1000 UNITS: Performed by: SURGERY

## 2023-08-29 PROCEDURE — 25010000002 PAPAVERINE PER 60 MG: Performed by: SURGERY

## 2023-08-29 PROCEDURE — 25010000002 CEFAZOLIN PER 500 MG: Performed by: SURGERY

## 2023-08-29 PROCEDURE — 85025 COMPLETE CBC W/AUTO DIFF WBC: CPT | Performed by: SURGERY

## 2023-08-29 PROCEDURE — 0 INSULIN REGULAR HUMAN PER 5 UNITS: Performed by: SURGERY

## 2023-08-29 PROCEDURE — C9290 INJ, BUPIVACAINE LIPOSOME: HCPCS | Performed by: SURGERY

## 2023-08-29 PROCEDURE — 85027 COMPLETE CBC AUTOMATED: CPT | Performed by: SURGERY

## 2023-08-29 PROCEDURE — 93010 ELECTROCARDIOGRAM REPORT: CPT | Performed by: EMERGENCY MEDICINE

## 2023-08-29 PROCEDURE — 25010000002 NITROGLYCERIN 200 MCG/ML SOLUTION: Performed by: NURSE ANESTHETIST, CERTIFIED REGISTERED

## 2023-08-29 PROCEDURE — 80069 RENAL FUNCTION PANEL: CPT | Performed by: SURGERY

## 2023-08-29 PROCEDURE — 94640 AIRWAY INHALATION TREATMENT: CPT

## 2023-08-29 PROCEDURE — 33533 CABG ARTERIAL SINGLE: CPT | Performed by: SURGERY

## 2023-08-29 PROCEDURE — 93005 ELECTROCARDIOGRAM TRACING: CPT | Performed by: SURGERY

## 2023-08-29 PROCEDURE — 25010000002 FUROSEMIDE PER 20 MG

## 2023-08-29 PROCEDURE — 25010000002 HEPARIN (PORCINE) PER 1000 UNITS

## 2023-08-29 PROCEDURE — 25010000002 VANCOMYCIN 1 G RECONSTITUTED SOLUTION 1 EACH VIAL: Performed by: SURGERY

## 2023-08-29 PROCEDURE — 82803 BLOOD GASES ANY COMBINATION: CPT

## 2023-08-29 PROCEDURE — C1751 CATH, INF, PER/CENT/MIDLINE: HCPCS | Performed by: NURSE ANESTHETIST, CERTIFIED REGISTERED

## 2023-08-29 PROCEDURE — 82330 ASSAY OF CALCIUM: CPT

## 2023-08-29 PROCEDURE — 25010000002 PROTAMINE SULFATE PER 10 MG: Performed by: NURSE ANESTHETIST, CERTIFIED REGISTERED

## 2023-08-29 PROCEDURE — 25010000002 ACETAMINOPHEN 10 MG/ML SOLUTION: Performed by: SURGERY

## 2023-08-29 PROCEDURE — 83050 HGB METHEMOGLOBIN QUAN: CPT

## 2023-08-29 PROCEDURE — 25010000002 MIDAZOLAM PER 1 MG: Performed by: NURSE ANESTHETIST, CERTIFIED REGISTERED

## 2023-08-29 PROCEDURE — 85730 THROMBOPLASTIN TIME PARTIAL: CPT | Performed by: SURGERY

## 2023-08-29 PROCEDURE — 25010000002 MORPHINE PER 10 MG: Performed by: SURGERY

## 2023-08-29 PROCEDURE — 25010000002 POTASSIUM CHLORIDE PER 2 MEQ OF POTASSIUM

## 2023-08-29 PROCEDURE — 82375 ASSAY CARBOXYHB QUANT: CPT

## 2023-08-29 PROCEDURE — 021009W BYPASS CORONARY ARTERY, ONE ARTERY FROM AORTA WITH AUTOLOGOUS VENOUS TISSUE, OPEN APPROACH: ICD-10-PCS | Performed by: SURGERY

## 2023-08-29 PROCEDURE — 25010000002 ONDANSETRON PER 1 MG: Performed by: SURGERY

## 2023-08-29 PROCEDURE — 06BP4ZZ EXCISION OF RIGHT SAPHENOUS VEIN, PERCUTANEOUS ENDOSCOPIC APPROACH: ICD-10-PCS | Performed by: SURGERY

## 2023-08-29 PROCEDURE — 25010000002 PHENYLEPHRINE 10 MG/ML SOLUTION

## 2023-08-29 PROCEDURE — 33517 CABG ARTERY-VEIN SINGLE: CPT | Performed by: SURGERY

## 2023-08-29 PROCEDURE — 25010000002 ALBUMIN HUMAN 5% PER 50 ML

## 2023-08-29 PROCEDURE — 85610 PROTHROMBIN TIME: CPT | Performed by: SURGERY

## 2023-08-29 PROCEDURE — 5A1221Z PERFORMANCE OF CARDIAC OUTPUT, CONTINUOUS: ICD-10-PCS | Performed by: SURGERY

## 2023-08-29 PROCEDURE — 0 POTASSIUM CHLORIDE PER 2 MEQ: Performed by: SURGERY

## 2023-08-29 PROCEDURE — 25010000002 HEPARIN (PORCINE) PER 1000 UNITS: Performed by: NURSE ANESTHETIST, CERTIFIED REGISTERED

## 2023-08-29 PROCEDURE — 71045 X-RAY EXAM CHEST 1 VIEW: CPT

## 2023-08-29 PROCEDURE — P9041 ALBUMIN (HUMAN),5%, 50ML: HCPCS

## 2023-08-29 PROCEDURE — 25810000003 DEXTROSE 5 % WITH KCL 20 MEQ 20 MEQ/L SOLUTION: Performed by: SURGERY

## 2023-08-29 PROCEDURE — 82805 BLOOD GASES W/O2 SATURATION: CPT

## 2023-08-29 PROCEDURE — 25010000002 MAGNESIUM SULFATE PER 500 MG OF MAGNESIUM

## 2023-08-29 PROCEDURE — 94002 VENT MGMT INPAT INIT DAY: CPT

## 2023-08-29 PROCEDURE — C1713 ANCHOR/SCREW BN/BN,TIS/BN: HCPCS | Performed by: SURGERY

## 2023-08-29 PROCEDURE — 02100Z9 BYPASS CORONARY ARTERY, ONE ARTERY FROM LEFT INTERNAL MAMMARY, OPEN APPROACH: ICD-10-PCS | Performed by: SURGERY

## 2023-08-29 DEVICE — PLEDGET INCISIONLINE REINF TFE SFT PTFE 1.5X3X7MM WHT: Type: IMPLANTABLE DEVICE | Site: CHEST | Status: FUNCTIONAL

## 2023-08-29 DEVICE — KT HEMOST ABS SURGIFOAM PORCN 1GRAM: Type: IMPLANTABLE DEVICE | Site: CHEST | Status: FUNCTIONAL

## 2023-08-29 DEVICE — WR SUT NONABS MF SS V40 1/2CIR TC 6/0 18IN M649G: Type: IMPLANTABLE DEVICE | Site: CHEST | Status: FUNCTIONAL

## 2023-08-29 DEVICE — DISK-SHAPED STYLE, SILICONE (1 PER STERILE PKG)
Type: IMPLANTABLE DEVICE | Site: CHEST | Status: FUNCTIONAL
Brand: SCANLAN® RADIOMARK® GRAFT MARKERS

## 2023-08-29 DEVICE — WAX BONE HEMO NAT 2.5G: Type: IMPLANTABLE DEVICE | Site: CHEST | Status: FUNCTIONAL

## 2023-08-29 DEVICE — IMPLANTABLE DEVICE: Type: IMPLANTABLE DEVICE | Site: CHEST | Status: FUNCTIONAL

## 2023-08-29 RX ORDER — METOPROLOL TARTRATE 5 MG/5ML
INJECTION INTRAVENOUS AS NEEDED
Status: DISCONTINUED | OUTPATIENT
Start: 2023-08-29 | End: 2023-08-29 | Stop reason: SURG

## 2023-08-29 RX ORDER — POLYETHYLENE GLYCOL 3350 17 G/17G
17 POWDER, FOR SOLUTION ORAL DAILY
Status: DISCONTINUED | OUTPATIENT
Start: 2023-08-30 | End: 2023-09-03 | Stop reason: HOSPADM

## 2023-08-29 RX ORDER — ATORVASTATIN CALCIUM 10 MG/1
20 TABLET, FILM COATED ORAL NIGHTLY
Status: DISCONTINUED | OUTPATIENT
Start: 2023-08-30 | End: 2023-09-03 | Stop reason: HOSPADM

## 2023-08-29 RX ORDER — SODIUM CHLORIDE 0.9 % (FLUSH) 0.9 %
3 SYRINGE (ML) INJECTION AS NEEDED
Status: DISCONTINUED | OUTPATIENT
Start: 2023-08-29 | End: 2023-08-29 | Stop reason: HOSPADM

## 2023-08-29 RX ORDER — DEXMEDETOMIDINE HYDROCHLORIDE 4 UG/ML
INJECTION, SOLUTION INTRAVENOUS CONTINUOUS PRN
Status: DISCONTINUED | OUTPATIENT
Start: 2023-08-29 | End: 2023-08-29 | Stop reason: SURG

## 2023-08-29 RX ORDER — SODIUM CHLORIDE, SODIUM LACTATE, POTASSIUM CHLORIDE, CALCIUM CHLORIDE 600; 310; 30; 20 MG/100ML; MG/100ML; MG/100ML; MG/100ML
1000 INJECTION, SOLUTION INTRAVENOUS CONTINUOUS
Status: DISCONTINUED | OUTPATIENT
Start: 2023-08-29 | End: 2023-08-29

## 2023-08-29 RX ORDER — CLOPIDOGREL BISULFATE 75 MG/1
75 TABLET ORAL DAILY
Status: DISCONTINUED | OUTPATIENT
Start: 2023-08-30 | End: 2023-09-03 | Stop reason: HOSPADM

## 2023-08-29 RX ORDER — HEPARIN SODIUM 1000 [USP'U]/ML
INJECTION, SOLUTION INTRAVENOUS; SUBCUTANEOUS AS NEEDED
Status: DISCONTINUED | OUTPATIENT
Start: 2023-08-29 | End: 2023-08-29 | Stop reason: SURG

## 2023-08-29 RX ORDER — IPRATROPIUM BROMIDE AND ALBUTEROL SULFATE 2.5; .5 MG/3ML; MG/3ML
3 SOLUTION RESPIRATORY (INHALATION)
Status: DISCONTINUED | OUTPATIENT
Start: 2023-08-29 | End: 2023-08-30

## 2023-08-29 RX ORDER — SODIUM CHLORIDE 0.9 % (FLUSH) 0.9 %
10 SYRINGE (ML) INJECTION AS NEEDED
Status: DISCONTINUED | OUTPATIENT
Start: 2023-08-29 | End: 2023-08-29 | Stop reason: HOSPADM

## 2023-08-29 RX ORDER — CHLORHEXIDINE GLUCONATE 0.12 MG/ML
15 RINSE ORAL EVERY 12 HOURS SCHEDULED
Status: DISCONTINUED | OUTPATIENT
Start: 2023-08-29 | End: 2023-08-29 | Stop reason: SDUPTHER

## 2023-08-29 RX ORDER — MIDAZOLAM HYDROCHLORIDE 1 MG/ML
INJECTION INTRAMUSCULAR; INTRAVENOUS AS NEEDED
Status: DISCONTINUED | OUTPATIENT
Start: 2023-08-29 | End: 2023-08-29 | Stop reason: SURG

## 2023-08-29 RX ORDER — SODIUM CHLORIDE, SODIUM LACTATE, POTASSIUM CHLORIDE, CALCIUM CHLORIDE 600; 310; 30; 20 MG/100ML; MG/100ML; MG/100ML; MG/100ML
100 INJECTION, SOLUTION INTRAVENOUS CONTINUOUS
Status: DISCONTINUED | OUTPATIENT
Start: 2023-08-29 | End: 2023-08-29

## 2023-08-29 RX ORDER — ALBUMIN, HUMAN INJ 5% 5 %
500 SOLUTION INTRAVENOUS ONCE
Status: COMPLETED | OUTPATIENT
Start: 2023-08-29 | End: 2023-08-29

## 2023-08-29 RX ORDER — SODIUM CHLORIDE 0.9 % (FLUSH) 0.9 %
30 SYRINGE (ML) INJECTION ONCE AS NEEDED
Status: DISCONTINUED | OUTPATIENT
Start: 2023-08-29 | End: 2023-08-29 | Stop reason: HOSPADM

## 2023-08-29 RX ORDER — SODIUM CHLORIDE, SODIUM LACTATE, POTASSIUM CHLORIDE, CALCIUM CHLORIDE 600; 310; 30; 20 MG/100ML; MG/100ML; MG/100ML; MG/100ML
INJECTION, SOLUTION INTRAVENOUS CONTINUOUS PRN
Status: DISCONTINUED | OUTPATIENT
Start: 2023-08-29 | End: 2023-08-29 | Stop reason: SURG

## 2023-08-29 RX ORDER — SODIUM CHLORIDE 0.9 % (FLUSH) 0.9 %
3 SYRINGE (ML) INJECTION EVERY 12 HOURS SCHEDULED
Status: DISCONTINUED | OUTPATIENT
Start: 2023-08-29 | End: 2023-08-29 | Stop reason: HOSPADM

## 2023-08-29 RX ORDER — POTASSIUM CHLORIDE, DEXTROSE MONOHYDRATE 150; 5 MG/100ML; G/100ML
30 INJECTION, SOLUTION INTRAVENOUS CONTINUOUS
Status: DISCONTINUED | OUTPATIENT
Start: 2023-08-29 | End: 2023-08-30

## 2023-08-29 RX ORDER — MEPERIDINE HYDROCHLORIDE 50 MG/ML
25 INJECTION INTRAMUSCULAR; INTRAVENOUS; SUBCUTANEOUS
Status: DISCONTINUED | OUTPATIENT
Start: 2023-08-29 | End: 2023-08-30

## 2023-08-29 RX ORDER — PANTOPRAZOLE SODIUM 40 MG/1
40 TABLET, DELAYED RELEASE ORAL
Status: DISCONTINUED | OUTPATIENT
Start: 2023-08-30 | End: 2023-09-03 | Stop reason: HOSPADM

## 2023-08-29 RX ORDER — BUPIVACAINE HCL/0.9 % NACL/PF 0.1 %
2000 PLASTIC BAG, INJECTION (ML) EPIDURAL ONCE
Status: DISCONTINUED | OUTPATIENT
Start: 2023-08-29 | End: 2023-08-29 | Stop reason: SDUPTHER

## 2023-08-29 RX ORDER — CHLORHEXIDINE GLUCONATE 0.12 MG/ML
15 RINSE ORAL EVERY 12 HOURS
Status: DISCONTINUED | OUTPATIENT
Start: 2023-08-29 | End: 2023-08-31

## 2023-08-29 RX ORDER — DEXMEDETOMIDINE HYDROCHLORIDE 4 UG/ML
.2-1.5 INJECTION, SOLUTION INTRAVENOUS
Status: DISCONTINUED | OUTPATIENT
Start: 2023-08-29 | End: 2023-08-30

## 2023-08-29 RX ORDER — NOREPINEPHRINE BITARTRATE 0.03 MG/ML
.02-.3 INJECTION, SOLUTION INTRAVENOUS CONTINUOUS PRN
Status: DISCONTINUED | OUTPATIENT
Start: 2023-08-29 | End: 2023-08-30

## 2023-08-29 RX ORDER — MAGNESIUM HYDROXIDE 1200 MG/15ML
LIQUID ORAL AS NEEDED
Status: DISCONTINUED | OUTPATIENT
Start: 2023-08-29 | End: 2023-08-29 | Stop reason: HOSPADM

## 2023-08-29 RX ORDER — ACETAMINOPHEN 650 MG/1
650 SUPPOSITORY RECTAL EVERY 4 HOURS PRN
Status: DISCONTINUED | OUTPATIENT
Start: 2023-08-30 | End: 2023-09-03 | Stop reason: HOSPADM

## 2023-08-29 RX ORDER — POTASSIUM CHLORIDE 29.8 MG/ML
20 INJECTION INTRAVENOUS
Status: COMPLETED | OUTPATIENT
Start: 2023-08-29 | End: 2023-08-29

## 2023-08-29 RX ORDER — NITROGLYCERIN 20 MG/100ML
5 INJECTION INTRAVENOUS CONTINUOUS
Status: DISCONTINUED | OUTPATIENT
Start: 2023-08-29 | End: 2023-08-30

## 2023-08-29 RX ORDER — OXYCODONE HYDROCHLORIDE 5 MG/1
10 TABLET ORAL EVERY 4 HOURS PRN
Status: DISCONTINUED | OUTPATIENT
Start: 2023-08-29 | End: 2023-09-03 | Stop reason: HOSPADM

## 2023-08-29 RX ORDER — SODIUM CHLORIDE 0.9 % (FLUSH) 0.9 %
3-10 SYRINGE (ML) INJECTION AS NEEDED
Status: DISCONTINUED | OUTPATIENT
Start: 2023-08-29 | End: 2023-08-29 | Stop reason: HOSPADM

## 2023-08-29 RX ORDER — ALBUMIN, HUMAN INJ 5% 5 %
SOLUTION INTRAVENOUS
Status: COMPLETED
Start: 2023-08-29 | End: 2023-08-29

## 2023-08-29 RX ORDER — LIDOCAINE HYDROCHLORIDE 20 MG/ML
INJECTION, SOLUTION EPIDURAL; INFILTRATION; INTRACAUDAL; PERINEURAL AS NEEDED
Status: DISCONTINUED | OUTPATIENT
Start: 2023-08-29 | End: 2023-08-29 | Stop reason: SURG

## 2023-08-29 RX ORDER — ASPIRIN 81 MG/1
162 TABLET, CHEWABLE ORAL ONCE
Status: COMPLETED | OUTPATIENT
Start: 2023-08-30 | End: 2023-08-30

## 2023-08-29 RX ORDER — SODIUM CHLORIDE 9 MG/ML
INJECTION, SOLUTION INTRAVENOUS CONTINUOUS PRN
Status: DISCONTINUED | OUTPATIENT
Start: 2023-08-29 | End: 2023-08-29 | Stop reason: SURG

## 2023-08-29 RX ORDER — DEXTROSE MONOHYDRATE 25 G/50ML
10-50 INJECTION, SOLUTION INTRAVENOUS
Status: DISCONTINUED | OUTPATIENT
Start: 2023-08-29 | End: 2023-08-29 | Stop reason: HOSPADM

## 2023-08-29 RX ORDER — MIDAZOLAM HYDROCHLORIDE 1 MG/ML
0.5 INJECTION INTRAMUSCULAR; INTRAVENOUS
Status: DISCONTINUED | OUTPATIENT
Start: 2023-08-29 | End: 2023-08-29 | Stop reason: HOSPADM

## 2023-08-29 RX ORDER — LIDOCAINE HYDROCHLORIDE 10 MG/ML
0.5 INJECTION, SOLUTION EPIDURAL; INFILTRATION; INTRACAUDAL; PERINEURAL ONCE AS NEEDED
Status: DISCONTINUED | OUTPATIENT
Start: 2023-08-29 | End: 2023-08-29 | Stop reason: HOSPADM

## 2023-08-29 RX ORDER — ASPIRIN 81 MG/1
81 TABLET ORAL DAILY
Status: DISCONTINUED | OUTPATIENT
Start: 2023-08-31 | End: 2023-09-03 | Stop reason: HOSPADM

## 2023-08-29 RX ORDER — NITROGLYCERIN 20 MG/100ML
INJECTION INTRAVENOUS CONTINUOUS PRN
Status: DISCONTINUED | OUTPATIENT
Start: 2023-08-29 | End: 2023-08-29 | Stop reason: SURG

## 2023-08-29 RX ORDER — MORPHINE SULFATE 2 MG/ML
2 INJECTION, SOLUTION INTRAMUSCULAR; INTRAVENOUS
Status: DISCONTINUED | OUTPATIENT
Start: 2023-08-29 | End: 2023-08-30

## 2023-08-29 RX ORDER — SODIUM CHLORIDE 0.9 % (FLUSH) 0.9 %
10 SYRINGE (ML) INJECTION EVERY 12 HOURS SCHEDULED
Status: DISCONTINUED | OUTPATIENT
Start: 2023-08-29 | End: 2023-08-29 | Stop reason: HOSPADM

## 2023-08-29 RX ORDER — BISACODYL 5 MG/1
10 TABLET, DELAYED RELEASE ORAL 2 TIMES DAILY
Status: DISCONTINUED | OUTPATIENT
Start: 2023-08-30 | End: 2023-09-03 | Stop reason: HOSPADM

## 2023-08-29 RX ORDER — PROTAMINE SULFATE 10 MG/ML
INJECTION, SOLUTION INTRAVENOUS AS NEEDED
Status: DISCONTINUED | OUTPATIENT
Start: 2023-08-29 | End: 2023-08-29 | Stop reason: SURG

## 2023-08-29 RX ORDER — ACETAMINOPHEN 10 MG/ML
1000 INJECTION, SOLUTION INTRAVENOUS EVERY 8 HOURS
Status: COMPLETED | OUTPATIENT
Start: 2023-08-29 | End: 2023-08-29

## 2023-08-29 RX ORDER — SODIUM CHLORIDE 9 MG/ML
40 INJECTION, SOLUTION INTRAVENOUS AS NEEDED
Status: DISCONTINUED | OUTPATIENT
Start: 2023-08-29 | End: 2023-08-29 | Stop reason: HOSPADM

## 2023-08-29 RX ORDER — ACETAMINOPHEN 500 MG
1000 TABLET ORAL ONCE
Status: COMPLETED | OUTPATIENT
Start: 2023-08-29 | End: 2023-08-29

## 2023-08-29 RX ORDER — ALBUTEROL SULFATE 2.5 MG/3ML
2.5 SOLUTION RESPIRATORY (INHALATION) EVERY 4 HOURS PRN
Status: DISCONTINUED | OUTPATIENT
Start: 2023-08-29 | End: 2023-08-30

## 2023-08-29 RX ORDER — ENOXAPARIN SODIUM 100 MG/ML
40 INJECTION SUBCUTANEOUS DAILY
Status: DISCONTINUED | OUTPATIENT
Start: 2023-08-30 | End: 2023-09-03 | Stop reason: HOSPADM

## 2023-08-29 RX ORDER — ACETAMINOPHEN 160 MG/5ML
650 SOLUTION ORAL EVERY 4 HOURS PRN
Status: DISCONTINUED | OUTPATIENT
Start: 2023-08-30 | End: 2023-09-03 | Stop reason: HOSPADM

## 2023-08-29 RX ORDER — ACETAMINOPHEN 325 MG/1
650 TABLET ORAL EVERY 4 HOURS PRN
Status: DISCONTINUED | OUTPATIENT
Start: 2023-08-30 | End: 2023-09-03 | Stop reason: HOSPADM

## 2023-08-29 RX ORDER — SODIUM CHLORIDE 9 MG/ML
30 INJECTION, SOLUTION INTRAVENOUS CONTINUOUS PRN
Status: DISCONTINUED | OUTPATIENT
Start: 2023-08-29 | End: 2023-08-29 | Stop reason: HOSPADM

## 2023-08-29 RX ORDER — VECURONIUM BROMIDE 1 MG/ML
INJECTION, POWDER, LYOPHILIZED, FOR SOLUTION INTRAVENOUS AS NEEDED
Status: DISCONTINUED | OUTPATIENT
Start: 2023-08-29 | End: 2023-08-29 | Stop reason: SURG

## 2023-08-29 RX ORDER — ONDANSETRON 2 MG/ML
4 INJECTION INTRAMUSCULAR; INTRAVENOUS EVERY 6 HOURS PRN
Status: DISCONTINUED | OUTPATIENT
Start: 2023-08-29 | End: 2023-09-03 | Stop reason: HOSPADM

## 2023-08-29 RX ORDER — NICOTINE POLACRILEX 4 MG
15 LOZENGE BUCCAL
Status: DISCONTINUED | OUTPATIENT
Start: 2023-08-29 | End: 2023-08-30

## 2023-08-29 RX ORDER — MIDAZOLAM HYDROCHLORIDE 1 MG/ML
2 INJECTION INTRAMUSCULAR; INTRAVENOUS ONCE
Status: COMPLETED | OUTPATIENT
Start: 2023-08-29 | End: 2023-08-29

## 2023-08-29 RX ORDER — NICOTINE POLACRILEX 4 MG
15 LOZENGE BUCCAL
Status: DISCONTINUED | OUTPATIENT
Start: 2023-08-29 | End: 2023-08-29 | Stop reason: HOSPADM

## 2023-08-29 RX ORDER — ROCURONIUM BROMIDE 10 MG/ML
INJECTION, SOLUTION INTRAVENOUS AS NEEDED
Status: DISCONTINUED | OUTPATIENT
Start: 2023-08-29 | End: 2023-08-29 | Stop reason: SURG

## 2023-08-29 RX ORDER — OXYCODONE HYDROCHLORIDE 5 MG/1
5 TABLET ORAL EVERY 4 HOURS PRN
Status: DISCONTINUED | OUTPATIENT
Start: 2023-08-29 | End: 2023-09-03 | Stop reason: HOSPADM

## 2023-08-29 RX ORDER — NITROGLYCERIN 0.4 MG/1
0.4 TABLET SUBLINGUAL
Status: DISCONTINUED | OUTPATIENT
Start: 2023-08-29 | End: 2023-09-03 | Stop reason: HOSPADM

## 2023-08-29 RX ORDER — ACETAMINOPHEN 325 MG/1
650 TABLET ORAL EVERY 6 HOURS
Status: DISCONTINUED | OUTPATIENT
Start: 2023-08-30 | End: 2023-08-31 | Stop reason: SDUPTHER

## 2023-08-29 RX ORDER — DEXTROSE MONOHYDRATE 25 G/50ML
10-50 INJECTION, SOLUTION INTRAVENOUS
Status: DISCONTINUED | OUTPATIENT
Start: 2023-08-29 | End: 2023-08-30

## 2023-08-29 RX ADMIN — OXYCODONE HYDROCHLORIDE 5 MG: 5 TABLET ORAL at 23:20

## 2023-08-29 RX ADMIN — ALBUMIN, HUMAN INJ 5% 500 ML: 5 SOLUTION at 12:54

## 2023-08-29 RX ADMIN — LIDOCAINE HYDROCHLORIDE 100 MG: 20 INJECTION, SOLUTION EPIDURAL; INFILTRATION; INTRACAUDAL; PERINEURAL at 08:43

## 2023-08-29 RX ADMIN — ROCURONIUM BROMIDE 100 MG: 10 SOLUTION INTRAVENOUS at 08:43

## 2023-08-29 RX ADMIN — SODIUM CHLORIDE, POTASSIUM CHLORIDE, SODIUM LACTATE AND CALCIUM CHLORIDE 1000 ML: 600; 310; 30; 20 INJECTION, SOLUTION INTRAVENOUS at 08:19

## 2023-08-29 RX ADMIN — ALBUMIN (HUMAN) 500 ML: 12.5 INJECTION, SOLUTION INTRAVENOUS at 12:54

## 2023-08-29 RX ADMIN — VECURONIUM BROMIDE 10 MG: 1 INJECTION, POWDER, LYOPHILIZED, FOR SOLUTION INTRAVENOUS at 09:46

## 2023-08-29 RX ADMIN — POTASSIUM PHOSPHATE, MONOBASIC POTASSIUM PHOSPHATE, DIBASIC 15 MMOL: 224; 236 INJECTION, SOLUTION, CONCENTRATE INTRAVENOUS at 17:15

## 2023-08-29 RX ADMIN — CALCIUM CHLORIDE 1000 MG: 100 INJECTION INTRAVENOUS; INTRAVENTRICULAR at 20:11

## 2023-08-29 RX ADMIN — MIDAZOLAM 5 MG: 1 INJECTION INTRAMUSCULAR; INTRAVENOUS at 08:42

## 2023-08-29 RX ADMIN — POTASSIUM CHLORIDE AND DEXTROSE MONOHYDRATE 30 ML/HR: 150; 5 INJECTION, SOLUTION INTRAVENOUS at 13:21

## 2023-08-29 RX ADMIN — CEFAZOLIN 2 G: 2 INJECTION, POWDER, FOR SOLUTION INTRAMUSCULAR; INTRAVENOUS at 09:13

## 2023-08-29 RX ADMIN — POTASSIUM CHLORIDE 20 MEQ: 29.8 INJECTION, SOLUTION INTRAVENOUS at 18:14

## 2023-08-29 RX ADMIN — SUGAMMADEX 200 MG: 100 INJECTION, SOLUTION INTRAVENOUS at 12:13

## 2023-08-29 RX ADMIN — ACETAMINOPHEN 1000 MG: 10 INJECTION, SOLUTION INTRAVENOUS at 15:10

## 2023-08-29 RX ADMIN — ACETAMINOPHEN 1000 MG: 10 INJECTION, SOLUTION INTRAVENOUS at 22:36

## 2023-08-29 RX ADMIN — AMINOCAPROIC ACID 5 G: 250 INJECTION, SOLUTION INTRAVENOUS at 09:01

## 2023-08-29 RX ADMIN — MIDAZOLAM HYDROCHLORIDE 2 MG: 1 INJECTION, SOLUTION INTRAMUSCULAR; INTRAVENOUS at 08:07

## 2023-08-29 RX ADMIN — METOPROLOL TARTRATE 1 MG: 5 INJECTION INTRAVENOUS at 09:14

## 2023-08-29 RX ADMIN — MORPHINE SULFATE 2 MG: 2 INJECTION, SOLUTION INTRAMUSCULAR; INTRAVENOUS at 12:50

## 2023-08-29 RX ADMIN — NOREPINEPHRINE BITARTRATE 0.02 MCG/KG/MIN: 0.03 INJECTION, SOLUTION INTRAVENOUS at 12:14

## 2023-08-29 RX ADMIN — NOREPINEPHRINE BITARTRATE 8 MCG: 1 INJECTION, SOLUTION, CONCENTRATE INTRAVENOUS at 10:12

## 2023-08-29 RX ADMIN — ONDANSETRON 4 MG: 2 INJECTION INTRAMUSCULAR; INTRAVENOUS at 22:23

## 2023-08-29 RX ADMIN — NITROGLYCERIN 5 MCG/MIN: 20 INJECTION INTRAVENOUS at 11:25

## 2023-08-29 RX ADMIN — DEXMEDETOMIDINE HYDROCHLORIDE 1 MCG/KG/HR: 4 INJECTION, SOLUTION INTRAVENOUS at 12:01

## 2023-08-29 RX ADMIN — IPRATROPIUM BROMIDE AND ALBUTEROL SULFATE 3 ML: .5; 3 SOLUTION RESPIRATORY (INHALATION) at 18:55

## 2023-08-29 RX ADMIN — NOREPINEPHRINE BITARTRATE 0.02 MCG/KG/MIN: 1 INJECTION, SOLUTION, CONCENTRATE INTRAVENOUS at 09:10

## 2023-08-29 RX ADMIN — SUFENTANIL CITRATE 70 MCG: 50 INJECTION EPIDURAL; INTRAVENOUS at 08:43

## 2023-08-29 RX ADMIN — CEFAZOLIN 2 G: 2 INJECTION, POWDER, FOR SOLUTION INTRAMUSCULAR; INTRAVENOUS at 16:51

## 2023-08-29 RX ADMIN — SODIUM CHLORIDE 2.2 UNITS/HR: 9 INJECTION, SOLUTION INTRAVENOUS at 17:33

## 2023-08-29 RX ADMIN — SODIUM CHLORIDE: 900 INJECTION INTRAVENOUS at 09:00

## 2023-08-29 RX ADMIN — POTASSIUM CHLORIDE 20 MEQ: 29.8 INJECTION, SOLUTION INTRAVENOUS at 19:17

## 2023-08-29 RX ADMIN — PROTAMINE SULFATE 200 MG: 10 INJECTION, SOLUTION INTRAVENOUS at 11:09

## 2023-08-29 RX ADMIN — NITROGLYCERIN 20 MCG: 20 INJECTION INTRAVENOUS at 11:27

## 2023-08-29 RX ADMIN — IPRATROPIUM BROMIDE AND ALBUTEROL SULFATE 3 ML: .5; 3 SOLUTION RESPIRATORY (INHALATION) at 14:22

## 2023-08-29 RX ADMIN — HEPARIN SODIUM 5000 UNITS: 1000 INJECTION, SOLUTION INTRAVENOUS; SUBCUTANEOUS at 09:46

## 2023-08-29 RX ADMIN — SUFENTANIL CITRATE 40 MCG: 50 INJECTION EPIDURAL; INTRAVENOUS at 09:30

## 2023-08-29 RX ADMIN — SODIUM CHLORIDE, SODIUM LACTATE, POTASSIUM CHLORIDE, CALCIUM CHLORIDE: 600; 310; 30; 20 INJECTION, SOLUTION INTRAVENOUS at 09:00

## 2023-08-29 RX ADMIN — SODIUM CHLORIDE, POTASSIUM CHLORIDE, SODIUM LACTATE AND CALCIUM CHLORIDE: 600; 310; 30; 20 INJECTION, SOLUTION INTRAVENOUS at 09:00

## 2023-08-29 RX ADMIN — MORPHINE SULFATE 2 MG: 2 INJECTION, SOLUTION INTRAMUSCULAR; INTRAVENOUS at 12:36

## 2023-08-29 RX ADMIN — HEPARIN SODIUM 32000 UNITS: 1000 INJECTION, SOLUTION INTRAVENOUS; SUBCUTANEOUS at 10:03

## 2023-08-29 RX ADMIN — NICARDIPINE HYDROCHLORIDE 15 MG/HR: 25 INJECTION, SOLUTION INTRAVENOUS at 12:45

## 2023-08-29 RX ADMIN — CHLORHEXIDINE GLUCONATE 15 ML: 1.2 SOLUTION ORAL at 15:58

## 2023-08-29 RX ADMIN — Medication 1 APPLICATION: at 15:58

## 2023-08-29 RX ADMIN — AMINOCAPROIC ACID 1 G/HR: 250 INJECTION, SOLUTION INTRAVENOUS at 09:00

## 2023-08-29 RX ADMIN — DEXMEDETOMIDINE HYDROCHLORIDE 0.7 MCG/KG/HR: 4 INJECTION, SOLUTION INTRAVENOUS at 15:10

## 2023-08-29 RX ADMIN — Medication 15 MG/HR: at 12:45

## 2023-08-29 RX ADMIN — NOREPINEPHRINE BITARTRATE 4 MCG: 1 INJECTION, SOLUTION, CONCENTRATE INTRAVENOUS at 11:47

## 2023-08-29 RX ADMIN — Medication 1 APPLICATION: at 07:30

## 2023-08-29 RX ADMIN — ACETAMINOPHEN 1000 MG: 500 TABLET, FILM COATED ORAL at 07:30

## 2023-08-29 RX ADMIN — NOREPINEPHRINE BITARTRATE 8 MCG: 1 INJECTION, SOLUTION, CONCENTRATE INTRAVENOUS at 11:00

## 2023-08-29 RX ADMIN — MORPHINE SULFATE 2 MG: 2 INJECTION, SOLUTION INTRAMUSCULAR; INTRAVENOUS at 20:48

## 2023-08-29 RX ADMIN — SUFENTANIL CITRATE 30 MCG: 50 INJECTION EPIDURAL; INTRAVENOUS at 09:31

## 2023-08-29 NOTE — ANESTHESIA POSTPROCEDURE EVALUATION
"Patient: Nehemiah Gomez    Procedure Summary       Date: 08/29/23 Room / Location:  [facility] OR  /  [facility]    Anesthesia Start: 0839 Anesthesia Stop: 1212    Procedure: CORONARY ARTERY BYPASS GRAFTING X2 WITH INTERNAL MAMMARY ARTERY GRAFT, RIGHT LEG OPEN VEIN HARVEST, TRANSESOPHAGEAL ECHOCARDIOGRAM (Chest) Diagnosis:       Coronary 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 heart with angina pectoris      (Coronary 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 heart with angina pectoris [I25.119])    Surgeons: Ernesto Cheema MD Provider: Ari Reddy CRNA    Anesthesia Type: general ASA Status: 4            Anesthesia Type: general    Vitals  Vitals Value Taken Time   /56 08/29/23 1208   Temp     Pulse 76 08/29/23 1213   Resp     SpO2 97 % 08/29/23 1213   Vitals shown include unvalidated device data.        Post Anesthesia Care and Evaluation    Patient location during evaluation: ICU  Patient participation: complete - patient cannot participate  Level of consciousness: obtunded/minimal responses  Pain score: 0  Pain management: adequate    Airway patency: patent  Anesthetic complications: No anesthetic complications  PONV Status: none  Cardiovascular status: acceptable  Respiratory status: acceptable, ETT, ventilator and intubated  Hydration status: acceptable    Comments: Blood pressure 143/77, pulse 51, temperature 96.7 øF (35.9 øC), temperature source Temporal, resp. rate 16, height 172 cm (67.72\"), weight 86.1 kg (189 lb 13.1 oz), SpO2 95 %.      "

## 2023-08-29 NOTE — INTERVAL H&P NOTE
No significant change since H&P.  Discussed the risks and benefits with him of proceeding with CABG.  He understands and agrees to proceed.    Ernesto Cheema M.D.  Cardiothoracic Surgeon

## 2023-08-29 NOTE — SIGNIFICANT NOTE
08/29/23 1220   Readings   Plateau Pressure (cm H2O) 20 cm H2O   Driving Pressure (cm H2O) 10.1 cm H2O   Static Compliance (L/cm H2O) 52   Dynamic Compliance (L/cm H2O) 55 L/cm H2O

## 2023-08-29 NOTE — ANESTHESIA PROCEDURE NOTES
Central Line    Pre-sedation assessment completed: 8/29/2023 8:46 AM    Patient reassessed immediately prior to procedure    Patient location during procedure: OR  Start time: 8/29/2023 8:46 AM  Stop Time:8/29/2023 8:52 AM  Indications: vascular access and central pressure monitoring  Staff  Anesthesiologist: Jadiel Molina MD  Preanesthetic Checklist  Completed: patient identified, IV checked, site marked, risks and benefits discussed, surgical consent, monitors and equipment checked, pre-op evaluation and timeout performed  Central Line Prep  Sterile Tech:gloves, cap, gown, mask and sterile barriers  Prep: chloraprep  Patient monitoring: blood pressure monitoring, continuous pulse oximetry and EKG  Central Line Procedure  Laterality:right  Location:internal jugular  Catheter Type:MAC and [place]-Ty  Catheter Size:9 Fr  Guidance:ultrasound guided  PROCEDURE NOTE/ULTRASOUND INTERPRETATION.  Using ultrasound guidance the potential vascular sites for insertion of the catheter were visualized to determine the patency of the vessel to be used for vascular access.  After selecting the appropriate site for insertion, the needle was visualized under ultrasound being inserted into the internal jugular vein, followed by ultrasound confirmation of wire and catheter placement. There were no abnormalities seen on ultrasound; an image was taken; and the patient tolerated the procedure with no complications.   Assessment  Post procedure:biopatch applied, line sutured and occlusive dressing applied  Assessement:blood return through all ports, free fluid flow, chest x-ray ordered and Javier Test  Complications:no  Patient Tolerance:patient tolerated the procedure well with no apparent complications  Additional Notes  PCW 50

## 2023-08-29 NOTE — ANESTHESIA PROCEDURE NOTES
Airway  Urgency: elective    Date/Time: 8/29/2023 8:44 AM  Airway not difficult    General Information and Staff    Patient location during procedure: OR  CRNA/CAA: Ari Reddy CRNA    Indications and Patient Condition  Indications for airway management: airway protection    Preoxygenated: yes  Mask difficulty assessment: 1 - vent by mask    Final Airway Details  Final airway type: endotracheal airway      Successful airway: ETT  Cuffed: yes   Successful intubation technique: direct laryngoscopy  Facilitating devices/methods: intubating stylet  Endotracheal tube insertion site: oral  Blade: Braga  Blade size: 2  ETT size (mm): 8.0  Cormack-Lehane Classification: grade I - full view of glottis  Placement verified by: chest auscultation and capnometry   Cuff volume (mL): 14  Measured from: teeth  ETT/EBT  to teeth (cm): 22  Number of attempts at approach: 1  Assessment: lips, teeth, and gum same as pre-op and atraumatic intubation

## 2023-08-29 NOTE — PROGRESS NOTES
RT EQUIPMENT DEVICE RELATED - SKIN ASSESSMENT    RT Medical Equipment/Device:     ETT Wagner/Anchorfast    Skin Assessment:      Cheek:  Intact  Neck:  Intact  Mouth:  Intact    Device Skin Pressure Protection:  Skin-to-device areas padded:  Anchorfast    Nurse Notification:  Lou Hyatt, RRT

## 2023-08-29 NOTE — ANESTHESIA PROCEDURE NOTES
Intra-Op Anesthesia YINKA    Procedure Performed: Intra-Op Anesthesia YINKA       Start Time:  8/29/2023 8:58 AM       End Time:   8/29/2023 9:04 AM    Preanesthesia Checklist:  Patient identified, IV assessed, risks and benefits discussed, monitors and equipment assessed, procedure being performed at surgeon's request and anesthesia consent obtained.    General Procedure Information  YINKA Placed for monitoring purposes only -- This is not a diagnostic YINKA

## 2023-08-29 NOTE — OP NOTE
Cardiac Surgery Operative Note     Date of Procedure:  8/29/2023     Preoperative Diagnosis:   Coronary 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 heart with unstable angina  Hypertension  Hyperlipidemia  Former Smoker  Overweight, BMI 29.1  GERD  Hx of Prostate Cancer     Postoperative Diagnosis:  Same     Procedures Performed:  1. Coronary Artery Bypass, using arterial graft; single arterial graft, CPT 01247  2. Coronary Artery bypass, using venous grafts and arterial grafts, 1 venous graft, CPT +40143     Procedure Summary: CABG x2 (LIMA to LAD, SVG to OM1)    Surgeon:  Ernesto Cheema MD  Assistants:  Radha Meneses CFA; Radha Mustafa [facility]  Anesthesia Staff:  Jadiel Kumar MD  Anesthesia Type:  General     Estimated Blood Loss:  Minimal (Cell Saver)     Drains:  1. 24 Turkish Kenney drain-left pleural space  2. 24 Turkish Kenney drains x2-anterior and posterior mediastinum     Specimens:  None     Operative Indications: Mr. Gomez is a 77-year-old male who presented with unstable angina.  He started having pain when he is walking between [place] when between his shoulders and squeezing tightness in his jaw.  He also had some pain at rest.  He is otherwise very active.  With this he underwent stress testing which was high risk for coronary ischemia subsequent coronary angiography showed severe distal left main stenosis as well as some stenoses throughout his large OM.  His right coronary was without significant disease.  With this he is referred to me for consideration of coronary bypass grafting.  I discussed with him the risk and benefits he understood and agreed to proceed.      Operative Findings:  Excellent arterial conduit. Good-excellent venous conduit. The LAD at site of grafting measured 2 mm in size and had moderate atherosclerotic disease burden.  OM1 was 2 mm with moderate atherosclerotic burden, grafted using SVG.  Transesophageal echocardiography salient findings include  stable normal left ventricular function with normal wall motion.       Total aortic cross-clamp time:  35 minutes  Total cardiac bypass time:  47 minutes     Operative description in detail:  The patient was taken to the operative suite where the patient was placed in a supine position.  Induction of general anesthesia and placement of a single-lumen endotracheal tube was performed without remark.  Appropriate arterial and venous access was established without remark.  A right IJ central venous catheter was placed followed by a Diana pulmonary artery catheter by anesthesia staff. The patient was then prepped and draped in the usual and sterile surgical fashion.  A timeout was performed.  Perioperative antibiotics were administered. Beta blocker was given.     A simultaneous team approach was used to harvest both the internal mammary artery as well as the right saphenous vein.  Right saphenous vein was harvested using open skip technique in the standard fashion.  It was an adequate length conduit with some sclerotic segments that were excised.  Side branches were controlled with a combination of suture and clips.  The leg wounds were hemostatic and were closed in anatomic layers using absorbable suture.     Median sternotomy was performed in standard fashion. Hemostasis was ensured along sternal edges.  A Rultract device was used to expose the posterior sternal table.  The left internal mammary artery was taken down using a standard pedicle technique with a combination of electrocautery and clips to control all side branches.  At that time, the patient was systemically anticoagulated with IV heparin.  A 24 Georgian Kenney drain was placed in the left pleural space.  After suitable time of circulating heparin, clips were placed doubly onto the mammary artery distally and it was divided proximal to the previously placed clips.  It had excellent arterial inflow.  The mammary artery was controlled distally.  The mammary  artery harvest bed was hemostatic.  The Rultract device was removed from the sterile field and a sternal retractor was used for exposure.         Midline mediastinal fat and thymus were divided and pericardium opened.  A pericardial well was created.  The distal ascending aorta and right atrial appendage were cannulated for cardiopulmonary bypass in standard fashion.  Aortic line was tested and was satisfactory.  A combined cardioplegia/aortic root vent set was secured with a horizontal mattress 4-0 Prolene suture.  With an appropriate ACT cardiopulmonary bypass was commenced.  I dissected out the LAD for suitable sites for bypass grafting.  The OM was examined and dissected out for suitable bypass sites.  Distal OM was too small for grafting.  With that, I proceeded to apply the aortic cross-clamp and administered cardioplegia utilizing [place] cardioplegia.    This was given in an antegrade fashion.  I ensured during the entire cardioplegia administration the LV did not distend.  Upon achieving electrical-mechanical arrest, I applied topical hypothermia to the ventricle and total standard dose was administered.       I then directed my attention to the OM 1.  Coronary arteriotomy was made and augmented to size with Freedman scissors.  The saphenous vein graft was prepared, it was anastomosed in end-to-side fashion using a running 7-0 Prolene suture.  Anastomosis was assessed for hemostasis which was excellent.  It was not tense or torsed.  I then measured the vein graft with heart full for proximal anastomosis.  Aortotomy was made and augmented to size using a 4 mm punch.  Proximal anastomosis was made in an end-to-side fashion using a running 6-0 Prolene suture.  Hemostasis was excellent and the aortic root was de-aired while tying the suture.      I directed my attention towards the LAD.  A coronary arteriotomy was made and augmented to size with Freedman scissors.  It is as per operative findings.  The LIMA was  prepared.  The anastomosis was constructed in an end-to-side orientation with running 7-0 Prolene suture.  The anastomosis was assessed for hemostasis which was excellent. It is without tension or torsion.  I did tack the mammary artery pedicle to the anterior aspect of the heart with 6-0 Prolene suture.     With that being accomplished, a terminal hotshot was administered.  The patient was placed in Trendelenburg position.  Upon completion of terminal hotshot and placement of temporary epicardial pacing wires, with the aortic vent on high and pump flows diminished, the aortic cross-clamp was released.  With that, full support was implemented.  A nonworking beating phase was implemented.  Ventilation restored.  I surveyed each graft and each anastomosis was hemostatic and had excellent lay.  With all in readiness, the heart was allowed to fill and then weaned off cardiopulmonary bypass.  We removed the aortic root vent/cardioplegia cannula set.  Its associated pursestring suture was tied securely and this was reinforced as per matter of routine. The table was now placed in neutral position.  I decannulated the venous line and snared down its associated pursestring suture.  Systemic intravenous protamine was administered.  All associated blood volume was returned to the patient. With continued good hemodynamics, I decannulated the arterial line and tied down its associated pursestring sutures.  Aortic cannulation site was reinforced as per routine.  At this time I tied down the previously snared pursestring suture.  The mediastinum was drained with 24 English Kenney drains placed anteriorly and posteriorly.  I surveyed the chest and hemostasis was pristine.  The sternum was reapproximated with stainless sterile wires placed in an interrupted fashion.  In layers anatomically the soft tissue planes were reapproximated. Instruments, sharps, and sponge counts were reported as correct.      Complications: None      Disposition: Transferred to ICU in stable and guarded condition.     Ernesto Cheema MD  Cardiothoracic Surgeon

## 2023-08-29 NOTE — ANESTHESIA PROCEDURE NOTES
Arterial Line    Pre-sedation assessment completed: 8/29/2023 8:25 AM    Patient reassessed immediately prior to procedure    Patient location during procedure: pre-op  Start time: 8/29/2023 8:25 AM  Stop Time:8/29/2023 8:25 AM       Line placed for hemodynamic monitoring and ABGs/Labs/ISTAT.  Performed By   Anesthesiologist: Jadiel Molina MD   Preanesthetic Checklist  Completed: patient identified, IV checked, site marked, risks and benefits discussed, surgical consent, monitors and equipment checked, pre-op evaluation and timeout performed  Arterial Line Prep    Sterile Tech: cap, gloves and sterile barriers  Prep: ChloraPrep  Patient monitoring: EKG, continuous pulse oximetry and blood pressure monitoring  Arterial Line Procedure   Laterality:right  Location:  radial artery  Catheter size: 20 G   Guidance: ultrasound guided  PROCEDURE NOTE/ULTRASOUND INTERPRETATION.  Using ultrasound guidance the potential vascular sites for insertion of the catheter were visualized to determine the patency of the vessel to be used for vascular access.  After selecting the appropriate site for insertion, the needle was visualized under ultrasound being inserted into the radial artery, followed by ultrasound confirmation of wire and catheter placement. There were no abnormalities seen on ultrasound; an image was taken; and the patient tolerated the procedure with no complications.   Number of attempts: 1  Successful placement: yes Images: still images not obtained  Post Assessment   Dressing Type: wrist guard applied, secured with tape and occlusive dressing applied.   Complications no  Circ/Move/Sens Assessment: normal and unchanged.   Patient Tolerance: patient tolerated the procedure well with no apparent complications

## 2023-08-29 NOTE — PLAN OF CARE
Goal Outcome Evaluation:      Pt arrived to unit at 1204. Bp was initially extremely labile, but was stabilized by approximately 1345 with the use of levo, cardene, and 1500 of albumin. Leak in MST noted and communicated to Dr. Cheema. Lactate initially 2.2 but reflex was 1.6. Critical Phos of 1.9, and replaced. 1600 Potassium 3.6, replaced. Currently not being paced, with a rate in the 80s. Pt briefly went into the 30s at approximately 1805, but immediately went back up into the 80s. Currently resting on 0.2 of precedex and able to wake up and follow commands, titrating off slowly as pt was very agitated when he initially woke up at 1700. Bedside shift report given to LINH Guerrero.

## 2023-08-30 ENCOUNTER — APPOINTMENT (OUTPATIENT)
Dept: GENERAL RADIOLOGY | Facility: HOSPITAL | Age: 78
DRG: 236 | End: 2023-08-30
Payer: MEDICARE

## 2023-08-30 LAB
ALBUMIN SERPL-MCNC: 4.7 G/DL (ref 3.5–5.2)
ANION GAP SERPL CALCULATED.3IONS-SCNC: 14 MMOL/L (ref 5–15)
ANION GAP SERPL CALCULATED.3IONS-SCNC: 16 MMOL/L (ref 5–15)
BASOPHILS # BLD AUTO: 0.02 10*3/MM3 (ref 0–0.2)
BASOPHILS NFR BLD AUTO: 0.2 % (ref 0–1.5)
BUN SERPL-MCNC: 12 MG/DL (ref 8–23)
BUN SERPL-MCNC: 13 MG/DL (ref 8–23)
BUN/CREAT SERPL: 12.6 (ref 7–25)
BUN/CREAT SERPL: 14.4 (ref 7–25)
CALCIUM SPEC-SCNC: 8.7 MG/DL (ref 8.6–10.5)
CALCIUM SPEC-SCNC: 8.9 MG/DL (ref 8.6–10.5)
CHLORIDE SERPL-SCNC: 106 MMOL/L (ref 98–107)
CHLORIDE SERPL-SCNC: 109 MMOL/L (ref 98–107)
CO2 SERPL-SCNC: 18 MMOL/L (ref 22–29)
CO2 SERPL-SCNC: 20 MMOL/L (ref 22–29)
CREAT SERPL-MCNC: 0.9 MG/DL (ref 0.76–1.27)
CREAT SERPL-MCNC: 0.95 MG/DL (ref 0.76–1.27)
DEPRECATED RDW RBC AUTO: 40.6 FL (ref 37–54)
DEPRECATED RDW RBC AUTO: 41.1 FL (ref 37–54)
EGFRCR SERPLBLD CKD-EPI 2021: 83 ML/MIN/1.73
EGFRCR SERPLBLD CKD-EPI 2021: 88.5 ML/MIN/1.73
EOSINOPHIL # BLD AUTO: 0 10*3/MM3 (ref 0–0.4)
EOSINOPHIL NFR BLD AUTO: 0 % (ref 0.3–6.2)
ERYTHROCYTE [DISTWIDTH] IN BLOOD BY AUTOMATED COUNT: 13 % (ref 12.3–15.4)
ERYTHROCYTE [DISTWIDTH] IN BLOOD BY AUTOMATED COUNT: 13.1 % (ref 12.3–15.4)
GLUCOSE BLDC GLUCOMTR-MCNC: 128 MG/DL (ref 70–130)
GLUCOSE BLDC GLUCOMTR-MCNC: 135 MG/DL (ref 70–130)
GLUCOSE BLDC GLUCOMTR-MCNC: 141 MG/DL (ref 70–130)
GLUCOSE BLDC GLUCOMTR-MCNC: 150 MG/DL (ref 70–130)
GLUCOSE BLDC GLUCOMTR-MCNC: 174 MG/DL (ref 70–130)
GLUCOSE BLDC GLUCOMTR-MCNC: 175 MG/DL (ref 70–130)
GLUCOSE BLDC GLUCOMTR-MCNC: 191 MG/DL (ref 70–130)
GLUCOSE BLDC GLUCOMTR-MCNC: 192 MG/DL (ref 70–130)
GLUCOSE BLDC GLUCOMTR-MCNC: 196 MG/DL (ref 70–130)
GLUCOSE BLDC GLUCOMTR-MCNC: 199 MG/DL (ref 70–130)
GLUCOSE BLDC GLUCOMTR-MCNC: 205 MG/DL (ref 70–130)
GLUCOSE BLDC GLUCOMTR-MCNC: 216 MG/DL (ref 70–130)
GLUCOSE SERPL-MCNC: 139 MG/DL (ref 65–99)
GLUCOSE SERPL-MCNC: 201 MG/DL (ref 65–99)
HCT VFR BLD AUTO: 33.8 % (ref 37.5–51)
HCT VFR BLD AUTO: 34 % (ref 37.5–51)
HGB BLD-MCNC: 11.5 G/DL (ref 13–17.7)
HGB BLD-MCNC: 11.5 G/DL (ref 13–17.7)
INR PPP: 1.25 (ref 0.91–1.09)
LYMPHOCYTES # BLD AUTO: 0.21 10*3/MM3 (ref 0.7–3.1)
LYMPHOCYTES NFR BLD AUTO: 1.6 % (ref 19.6–45.3)
MAGNESIUM SERPL-MCNC: 2 MG/DL (ref 1.6–2.4)
MCH RBC QN AUTO: 29.6 PG (ref 26.6–33)
MCH RBC QN AUTO: 29.7 PG (ref 26.6–33)
MCHC RBC AUTO-ENTMCNC: 33.8 G/DL (ref 31.5–35.7)
MCHC RBC AUTO-ENTMCNC: 34 G/DL (ref 31.5–35.7)
MCV RBC AUTO: 87.3 FL (ref 79–97)
MCV RBC AUTO: 87.4 FL (ref 79–97)
MONOCYTES # BLD AUTO: 0.82 10*3/MM3 (ref 0.1–0.9)
MONOCYTES NFR BLD AUTO: 6.4 % (ref 5–12)
NEUTROPHILS NFR BLD AUTO: 11.62 10*3/MM3 (ref 1.7–7)
NEUTROPHILS NFR BLD AUTO: 91.3 % (ref 42.7–76)
PHOSPHATE SERPL-MCNC: 2.4 MG/DL (ref 2.5–4.5)
PLATELET # BLD AUTO: 142 10*3/MM3 (ref 140–450)
PLATELET # BLD AUTO: 167 10*3/MM3 (ref 140–450)
PMV BLD AUTO: 11.4 FL (ref 6–12)
PMV BLD AUTO: 11.5 FL (ref 6–12)
POTASSIUM SERPL-SCNC: 3.4 MMOL/L (ref 3.5–5.2)
POTASSIUM SERPL-SCNC: 3.7 MMOL/L (ref 3.5–5.2)
POTASSIUM SERPL-SCNC: 4.1 MMOL/L (ref 3.5–5.2)
PROTHROMBIN TIME: 15.8 SECONDS (ref 11.8–14.8)
QT INTERVAL: 398 MS
QT INTERVAL: 406 MS
QTC INTERVAL: 435 MS
QTC INTERVAL: 492 MS
RBC # BLD AUTO: 3.87 10*6/MM3 (ref 4.14–5.8)
RBC # BLD AUTO: 3.89 10*6/MM3 (ref 4.14–5.8)
SODIUM SERPL-SCNC: 140 MMOL/L (ref 136–145)
SODIUM SERPL-SCNC: 143 MMOL/L (ref 136–145)
WBC NRBC COR # BLD: 12.73 10*3/MM3 (ref 3.4–10.8)
WBC NRBC COR # BLD: 17.76 10*3/MM3 (ref 3.4–10.8)

## 2023-08-30 PROCEDURE — 93005 ELECTROCARDIOGRAM TRACING: CPT | Performed by: SURGERY

## 2023-08-30 PROCEDURE — 85610 PROTHROMBIN TIME: CPT | Performed by: SURGERY

## 2023-08-30 PROCEDURE — 84132 ASSAY OF SERUM POTASSIUM: CPT | Performed by: SURGERY

## 2023-08-30 PROCEDURE — 25010000002 METOCLOPRAMIDE PER 10 MG: Performed by: NURSE PRACTITIONER

## 2023-08-30 PROCEDURE — 63710000001 INSULIN LISPRO (HUMAN) PER 5 UNITS: Performed by: NURSE PRACTITIONER

## 2023-08-30 PROCEDURE — 80069 RENAL FUNCTION PANEL: CPT | Performed by: SURGERY

## 2023-08-30 PROCEDURE — 97110 THERAPEUTIC EXERCISES: CPT

## 2023-08-30 PROCEDURE — 25010000002 FUROSEMIDE PER 20 MG: Performed by: NURSE PRACTITIONER

## 2023-08-30 PROCEDURE — 93010 ELECTROCARDIOGRAM REPORT: CPT | Performed by: EMERGENCY MEDICINE

## 2023-08-30 PROCEDURE — 85027 COMPLETE CBC AUTOMATED: CPT | Performed by: NURSE PRACTITIONER

## 2023-08-30 PROCEDURE — 80048 BASIC METABOLIC PNL TOTAL CA: CPT | Performed by: NURSE PRACTITIONER

## 2023-08-30 PROCEDURE — 0 POTASSIUM CHLORIDE PER 2 MEQ: Performed by: SURGERY

## 2023-08-30 PROCEDURE — 82948 REAGENT STRIP/BLOOD GLUCOSE: CPT

## 2023-08-30 PROCEDURE — 25010000002 ENOXAPARIN PER 10 MG: Performed by: SURGERY

## 2023-08-30 PROCEDURE — 71045 X-RAY EXAM CHEST 1 VIEW: CPT

## 2023-08-30 PROCEDURE — 85025 COMPLETE CBC W/AUTO DIFF WBC: CPT | Performed by: SURGERY

## 2023-08-30 PROCEDURE — 97162 PT EVAL MOD COMPLEX 30 MIN: CPT

## 2023-08-30 PROCEDURE — 94799 UNLISTED PULMONARY SVC/PX: CPT

## 2023-08-30 PROCEDURE — 83735 ASSAY OF MAGNESIUM: CPT | Performed by: SURGERY

## 2023-08-30 PROCEDURE — 25010000002 CEFAZOLIN PER 500 MG: Performed by: NURSE PRACTITIONER

## 2023-08-30 PROCEDURE — 94664 DEMO&/EVAL PT USE INHALER: CPT

## 2023-08-30 PROCEDURE — 97116 GAIT TRAINING THERAPY: CPT

## 2023-08-30 PROCEDURE — 25010000002 CEFAZOLIN PER 500 MG: Performed by: SURGERY

## 2023-08-30 PROCEDURE — 25010000002 ONDANSETRON PER 1 MG: Performed by: SURGERY

## 2023-08-30 RX ORDER — FUROSEMIDE 10 MG/ML
20 INJECTION INTRAMUSCULAR; INTRAVENOUS ONCE
Status: COMPLETED | OUTPATIENT
Start: 2023-08-30 | End: 2023-08-30

## 2023-08-30 RX ORDER — METOCLOPRAMIDE HYDROCHLORIDE 5 MG/ML
10 INJECTION INTRAMUSCULAR; INTRAVENOUS EVERY 6 HOURS
Status: COMPLETED | OUTPATIENT
Start: 2023-08-30 | End: 2023-08-31

## 2023-08-30 RX ORDER — INSULIN LISPRO 100 [IU]/ML
2-9 INJECTION, SOLUTION INTRAVENOUS; SUBCUTANEOUS
Status: DISCONTINUED | OUTPATIENT
Start: 2023-08-30 | End: 2023-09-03 | Stop reason: HOSPADM

## 2023-08-30 RX ORDER — LISINOPRIL 10 MG/1
10 TABLET ORAL
Status: DISCONTINUED | OUTPATIENT
Start: 2023-08-30 | End: 2023-09-03 | Stop reason: HOSPADM

## 2023-08-30 RX ORDER — POTASSIUM CHLORIDE 750 MG/1
20 CAPSULE, EXTENDED RELEASE ORAL ONCE
Status: COMPLETED | OUTPATIENT
Start: 2023-08-30 | End: 2023-08-30

## 2023-08-30 RX ORDER — POTASSIUM CHLORIDE 29.8 MG/ML
20 INJECTION INTRAVENOUS ONCE
Status: COMPLETED | OUTPATIENT
Start: 2023-08-30 | End: 2023-08-30

## 2023-08-30 RX ORDER — POTASSIUM CHLORIDE 750 MG/1
40 CAPSULE, EXTENDED RELEASE ORAL EVERY 4 HOURS
Status: COMPLETED | OUTPATIENT
Start: 2023-08-30 | End: 2023-08-30

## 2023-08-30 RX ORDER — DEXTROSE MONOHYDRATE 25 G/50ML
25 INJECTION, SOLUTION INTRAVENOUS
Status: DISCONTINUED | OUTPATIENT
Start: 2023-08-30 | End: 2023-09-03 | Stop reason: HOSPADM

## 2023-08-30 RX ORDER — CHOLECALCIFEROL (VITAMIN D3) 125 MCG
5 CAPSULE ORAL NIGHTLY PRN
COMMUNITY

## 2023-08-30 RX ORDER — NICOTINE POLACRILEX 4 MG
15 LOZENGE BUCCAL
Status: DISCONTINUED | OUTPATIENT
Start: 2023-08-30 | End: 2023-09-03 | Stop reason: HOSPADM

## 2023-08-30 RX ADMIN — CLOPIDOGREL BISULFATE 75 MG: 75 TABLET, FILM COATED ORAL at 17:55

## 2023-08-30 RX ADMIN — OXYCODONE HYDROCHLORIDE 5 MG: 5 TABLET ORAL at 21:54

## 2023-08-30 RX ADMIN — POTASSIUM CHLORIDE 20 MEQ: 29.8 INJECTION, SOLUTION INTRAVENOUS at 04:17

## 2023-08-30 RX ADMIN — POLYETHYLENE GLYCOL 3350 17 G: 17 POWDER, FOR SOLUTION ORAL at 08:20

## 2023-08-30 RX ADMIN — ATORVASTATIN CALCIUM 20 MG: 10 TABLET, FILM COATED ORAL at 21:49

## 2023-08-30 RX ADMIN — BISACODYL 10 MG: 5 TABLET ORAL at 21:49

## 2023-08-30 RX ADMIN — ACETAMINOPHEN 650 MG: 325 TABLET, FILM COATED ORAL at 13:15

## 2023-08-30 RX ADMIN — METOPROLOL TARTRATE 25 MG: 25 TABLET, FILM COATED ORAL at 08:20

## 2023-08-30 RX ADMIN — Medication 1 APPLICATION: at 03:17

## 2023-08-30 RX ADMIN — INSULIN LISPRO 2 UNITS: 100 INJECTION, SOLUTION INTRAVENOUS; SUBCUTANEOUS at 21:53

## 2023-08-30 RX ADMIN — PANTOPRAZOLE SODIUM 40 MG: 40 TABLET, DELAYED RELEASE ORAL at 06:07

## 2023-08-30 RX ADMIN — ONDANSETRON 4 MG: 2 INJECTION INTRAMUSCULAR; INTRAVENOUS at 04:17

## 2023-08-30 RX ADMIN — IPRATROPIUM BROMIDE AND ALBUTEROL SULFATE 3 ML: .5; 3 SOLUTION RESPIRATORY (INHALATION) at 05:58

## 2023-08-30 RX ADMIN — CEFAZOLIN 2 G: 2 INJECTION, POWDER, FOR SOLUTION INTRAMUSCULAR; INTRAVENOUS at 00:48

## 2023-08-30 RX ADMIN — CHLORHEXIDINE GLUCONATE 15 ML: 1.2 SOLUTION ORAL at 15:40

## 2023-08-30 RX ADMIN — LISINOPRIL 10 MG: 10 TABLET ORAL at 14:57

## 2023-08-30 RX ADMIN — ACETAMINOPHEN 650 MG: 325 TABLET, FILM COATED ORAL at 08:20

## 2023-08-30 RX ADMIN — METOPROLOL TARTRATE 25 MG: 25 TABLET, FILM COATED ORAL at 21:49

## 2023-08-30 RX ADMIN — OXYCODONE HYDROCHLORIDE 10 MG: 5 TABLET ORAL at 08:18

## 2023-08-30 RX ADMIN — ASPIRIN 162 MG: 81 TABLET, CHEWABLE ORAL at 08:21

## 2023-08-30 RX ADMIN — OXYCODONE HYDROCHLORIDE 5 MG: 5 TABLET ORAL at 18:07

## 2023-08-30 RX ADMIN — CHLORHEXIDINE GLUCONATE 15 ML: 1.2 SOLUTION ORAL at 03:17

## 2023-08-30 RX ADMIN — CEFAZOLIN 2 G: 2 INJECTION, POWDER, FOR SOLUTION INTRAMUSCULAR; INTRAVENOUS at 08:36

## 2023-08-30 RX ADMIN — POTASSIUM CHLORIDE 40 MEQ: 10 CAPSULE, COATED, EXTENDED RELEASE ORAL at 16:10

## 2023-08-30 RX ADMIN — ACETAMINOPHEN 650 MG: 325 TABLET, FILM COATED ORAL at 21:53

## 2023-08-30 RX ADMIN — OXYCODONE HYDROCHLORIDE 10 MG: 5 TABLET ORAL at 13:15

## 2023-08-30 RX ADMIN — POTASSIUM CHLORIDE 40 MEQ: 10 CAPSULE, COATED, EXTENDED RELEASE ORAL at 12:26

## 2023-08-30 RX ADMIN — METOCLOPRAMIDE HYDROCHLORIDE 10 MG: 5 INJECTION INTRAMUSCULAR; INTRAVENOUS at 08:16

## 2023-08-30 RX ADMIN — Medication 1 APPLICATION: at 15:40

## 2023-08-30 RX ADMIN — BISACODYL 10 MG: 5 TABLET ORAL at 08:20

## 2023-08-30 RX ADMIN — FUROSEMIDE 20 MG: 10 INJECTION, SOLUTION INTRAMUSCULAR; INTRAVENOUS at 08:17

## 2023-08-30 RX ADMIN — POTASSIUM CHLORIDE 20 MEQ: 10 CAPSULE, COATED, EXTENDED RELEASE ORAL at 08:17

## 2023-08-30 RX ADMIN — ENOXAPARIN SODIUM 40 MG: 100 INJECTION SUBCUTANEOUS at 08:17

## 2023-08-30 RX ADMIN — METOCLOPRAMIDE HYDROCHLORIDE 10 MG: 5 INJECTION INTRAMUSCULAR; INTRAVENOUS at 21:49

## 2023-08-30 RX ADMIN — IPRATROPIUM BROMIDE AND ALBUTEROL SULFATE 3 ML: .5; 3 SOLUTION RESPIRATORY (INHALATION) at 10:19

## 2023-08-30 RX ADMIN — OXYCODONE HYDROCHLORIDE 10 MG: 5 TABLET ORAL at 03:17

## 2023-08-30 RX ADMIN — CEFAZOLIN 2 G: 2 INJECTION, POWDER, FOR SOLUTION INTRAMUSCULAR; INTRAVENOUS at 17:55

## 2023-08-30 RX ADMIN — METOCLOPRAMIDE HYDROCHLORIDE 10 MG: 5 INJECTION INTRAMUSCULAR; INTRAVENOUS at 14:59

## 2023-08-30 NOTE — PLAN OF CARE
Goal Outcome Evaluation:              Outcome Evaluation: Pt extubated @2252 to room air. Last CO 8.7/ CI 4.36. Pt has had multiple episodes of vomiting on and off ventilator, zofran given x2. Oxy given x2. On and off cardene throughout shift, has been stopped since 0630. HR has been sinus in the 90's, occasional PVC's. MST output 220 mL, PL output 25 mL, UOP 1L. 3rd run of KCL given this morning due to K being 3.7.

## 2023-08-30 NOTE — THERAPY EVALUATION
Patient Name: Nehemiah Gomez  : 1945    MRN: [id number]                              Today's Date: 2023       Admit Date: 2023    Visit Dx:     ICD-10-CM ICD-9-CM   1. Impaired mobility [Z74.09 (ICD-10-CM)]  Z74.09 799.89   2. Coronary 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 heart with angina pectoris  I25.119 414.01     413.9     Patient Active Problem List   Diagnosis    Hypertension    GERD without esophagitis    Hyperlipidemia    Erectile dysfunction    Elevated prostate specific antigen (PSA)    BPH (benign prostatic hypertrophy) with urinary obstruction    Cough    Bronchitis    Primary insomnia    Benign prostatic hyperplasia with lower urinary tract symptoms    Prostate cancer    Scabies    Coronary 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 heart with angina pectoris    Abnormal stress test    Left main coronary artery disease    CAD (coronary artery disease), native coronary artery    Coronary 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 heart     Past Medical History:   Diagnosis Date    Coronary 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 heart with angina pectoris 2023    Diverticulosis     Erectile disorder due to 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     GERD (gastroesophageal reflux disease)     Heart murmur     Hyperlipidemia     Hypertension     Insomnia     Kidney stone     PONV (postoperative nausea and vomiting)     Prostate cancer      Past Surgical History:   Procedure Laterality Date    CARDIAC CATHETERIZATION N/A 2023    Procedure: Left Heart Cath;  Surgeon: Blayne Garcia MD;  Location: [facility] CATH INVASIVE LOCATION;  Service: Cardiology;  Laterality: N/A;    CHOLECYSTECTOMY      COLONOSCOPY  2006    left sided diverticulosis    ENDOSCOPY  2006    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;s distal esophagus    PROSTATE BIOPSY N/A 2020    Procedure: PROSTATE ULTRASOUND URONAV FUSION BIOPSY.;  Surgeon: Sissy  Watson LUA MD;  Location:  [facility] OR;  Service: Urology;  Laterality: N/A;      General Information       Row Name 08/30/23 0925          Physical Therapy Time and Intention    Document Type evaluation  s/p CABG x2 on 8/29. R LE vein harvest. Hx of CC: in past 3 months, experiencing sqeezing/chest tightness, SOB, diaphoresis, pain/burning at rest and with activity. Dx: CAD. L cardiac cath on 7/24.  -XOCHITL (r) BE (t) XOCHITL (c)     Mode of Treatment physical therapy  -XOCHITL (r) BE (t) XOCHITL (c)       Row Name 08/30/23 0925          General Information    Patient Profile Reviewed yes  -XOCHITL (r) BE (t) XOCHITL (c)     Prior Level of Function independent:;all household mobility;ADL's  -XOCHITL     Existing Precautions/Restrictions fall;sternal;oxygen therapy device and L/min  chest tube x 2,  -XOCHITL     Barriers to Rehab medically complex  -XOCHITL       Row Name 08/30/23 0925          Living Environment    People in Home significant other  -XOCHITL       Row Name 08/30/23 0925          Home Main Entrance    Number of Stairs, Main Entrance four  -XOCHITL     Stair Railings, Main Entrance railings on both sides of stairs  -XOCHITL       Row Name 08/30/23 0925          Stairs Within Home, Primary    Number of Stairs, Within Home, Primary none  -XOCHITL       Row Name 08/30/23 0925          Cognition    Orientation Status (Cognition) oriented x 4  -XOCHITL       Row Name 08/30/23 0925          Safety Issues, Functional Mobility    Impairments Affecting Function (Mobility) balance;endurance/activity tolerance;strength;shortness of breath;pain  -XOCHITL               User Key  (r) = Recorded By, (t) = Taken By, (c) = Cosigned By      Initials Name Provider Type    XOCHITL Kevin Maxwell, PT DPT Physical Therapist    Ramona Diaz, PT Student PT Student                   Mobility       Row Name 08/30/23 0925          Bed Mobility    Comment, (Bed Mobility) in chair  -XOCHITL       Row Name 08/30/23 0925          Sit-Stand Transfer    Sit-Stand [place] (Transfers) minimum assist (75%  patient effort)  -XOCHITL       Row Name 08/30/23 0925          Gait/Stairs (Locomotion)    [place] Level (Gait) minimum assist (75% patient effort)  -XOCHITL     Distance in Feet (Gait) 200 ft  -XOCHITL     Deviations/Abnormal Patterns (Gait) gait speed decreased  -XOCHITL               User Key  (r) = Recorded By, (t) = Taken By, (c) = Cosigned By      Initials Name Provider Type    Kevin Phillips PT DPMILO Physical Therapist                   Obj/Interventions       Row Name 08/30/23 0925          Range of Motion Comprehensive    Comment, General Range of Motion B LE AROM WFL  -XOCHITL       Row Name 08/30/23 0925          Strength Comprehensive (MMT)    Comment, General Manual Muscle Testing (MMT) Assessment B LE grossly 4-/5  -XOCHITL       Row Name 08/30/23 0925          Balance    Balance Assessment sitting dynamic balance;standing dynamic balance  -XOCHITL     Dynamic Sitting Balance supervision  -XOCHITL     Position, Sitting Balance unsupported;sitting in chair  -XOCHITL     Dynamic Standing Balance minimal assist  -XOCHITL     Position/Device Used, Standing Balance supported  -XOCHITL       Row Name 08/30/23 0925          Sensory Assessment (Somatosensory)    Sensory Assessment (Somatosensory) LE sensation intact  -XOCHITL               User Key  (r) = Recorded By, (t) = Taken By, (c) = Cosigned By      Initials Name Provider Type    Kevin Phillips PT DPT Physical Therapist                   Goals/Plan       Row Name 08/30/23 0925          Bed Mobility Goal 1 (PT)    Activity/Assistive Device (Bed Mobility Goal 1, PT) sit to supine/supine to sit  -XOCHITL     [place] Level/Cues Needed (Bed Mobility Goal 1, PT) supervision required  -XOCHITL     Time Frame (Bed Mobility Goal 1, PT) long term goal (LTG);10 days  -XOCHITL     Progress/Outcomes (Bed Mobility Goal 1, PT) new goal  -XOCHITL       Row Name 08/30/23 0925          Transfer Goal 1 (PT)    Activity/Assistive Device (Transfer Goal 1, PT) sit-to-stand/stand-to-sit;bed-to-chair/chair-to-bed  -XOCHITL      [place] Level/Cues Needed (Transfer Goal 1, PT) supervision required  -XOCHITL     Time Frame (Transfer Goal 1, PT) long term goal (LTG);10 days  -XOCHITL     Progress/Outcome (Transfer Goal 1, PT) new goal  -XOCHITL       Row Name 08/30/23 0925          Gait Training Goal 1 (PT)    Activity/Assistive Device (Gait Training Goal 1, PT) gait (walking locomotion);decrease fall risk;improve balance and speed;increase endurance/gait distance  -XOCHITL     [place] Level (Gait Training Goal 1, PT) supervision required  -XOCHITL     Distance (Gait Training Goal 1, PT) 300 ft  -XOCHITL     Time Frame (Gait Training Goal 1, PT) long term goal (LTG);10 days  -XOCHITL     Progress/Outcome (Gait Training Goal 1, PT) new goal  -XOCHITL       Row Name 08/30/23 0925          Stairs Goal 1 (PT)    Activity/Assistive Device (Stairs Goal 1, PT) ascending stairs;descending stairs;using handrail, left;using handrail, right  -XOCHITL     [place] Level/Cues Needed (Stairs Goal 1, PT) supervision required  -XOCHITL     Number of Stairs (Stairs Goal 1, PT) 4 steps  -XOCHITL     Time Frame (Stairs Goal 1, PT) long term goal (LTG);10 days  -XOCHITL     Progress/Outcome (Stairs Goal 1, PT) new goal  -XOCHITL       Row Name 08/30/23 0925          Therapy Assessment/Plan (PT)    Planned Therapy Interventions (PT) bed mobility training;transfer training;gait training;balance training;home exercise program;patient/family education;postural re-education;stair training;strengthening  -XOCHITL               User Key  (r) = Recorded By, (t) = Taken By, (c) = Cosigned By      Initials Name Provider Type    Kevin Phillips, PT DPT Physical Therapist                   Clinical Impression       Row Name 08/30/23 0925          Pain    Pain Intervention(s) Repositioned;Ambulation/increased activity  -XOCHITL     Additional Documentation Pain Scale: FACES Pre/Post-Treatment (Group)  -XOCHITL       Row Name 08/30/23 0925          Pain Scale: FACES Pre/Post-Treatment    Pain: FACES Scale, Pretreatment 4-->hurts little  more  -XOCHITL     Pain Location incisional  -XOCHITL     Pain Location - chest  -XOCHITL       Row Name 08/30/23 0925          Plan of Care Review    Plan of Care Reviewed With patient  -XOCHITL     Outcome Evaluation PT eval complete. He is alert and oriented x 4. He was educated on sternal precautions. Needs min assist to stand and to ambulate. He was able to ambulate 200 ft around the unuit. Demos a good pace with gait. Minimal reports of pain. HR to 115 with activity. PT will cont to progress mobility, balance, and strength. I anticipate he will cont to recover and d.c home with s.o.  -XOCHITL       Row Name 08/30/23 0925          Therapy Assessment/Plan (PT)    Patient/Family Therapy Goals Statement (PT) go home  -XOCHITL     Rehab Potential (PT) good, to achieve stated therapy goals  -XOCHITL     Criteria for Skilled Interventions Met (PT) yes;meets criteria;skilled treatment is necessary  -XOCHITL     Therapy Frequency (PT) 2 times/day  -XOCHITL     Predicted Duration of Therapy Intervention (PT) until d.c  -XOCHITL       Row Name 08/30/23 0925          Vital Signs    Pre Systolic BP Rehab 117  -XOCHITL     Pre Treatment Diastolic BP 59  -XOCHITL     Post Systolic BP Rehab 146  -XOCHITL     Post Treatment Diastolic BP 66  -XOCHITL     Pretreatment Heart Rate (beats/min) 92  -XOCHITL     Intratreatment Heart Rate (beats/min) 115  -XOCHITL     Posttreatment Heart Rate (beats/min) 99  -XOCHITL     Pre SpO2 (%) 95  -XOCHITL     O2 Delivery Pre Treatment nasal cannula  -XOCHITL     O2 Delivery Intra Treatment nasal cannula  -XOCHITL     Post SpO2 (%) 98  -XOCHITL     O2 Delivery Post Treatment nasal cannula  -XOCHITL     Pre Patient Position Sitting  -XOCHITL     Intra Patient Position Standing  -XOCHITL     Post Patient Position Sitting  -XOCHITL       Row Name 08/30/23 0925          Positioning and Restraints    Pre-Treatment Position sitting in chair/recliner  -XOCHITL     Post Treatment Position chair  -XOCHITL     In Chair sitting;call light within reach;encouraged to call for assist;RUE elevated;LUE elevated;with nsg;notified nsg  -XOCHITL                User Key  (r) = Recorded By, (t) = Taken By, (c) = Cosigned By      Initials Name Provider Type    Kevin Phillips PT DPT Physical Therapist                   Outcome Measures       Row Name 08/30/23 0925          How much help from another person do you currently need...    Turning from your back to your side while in flat bed without using bedrails? 2  -XOCHITL     Moving from lying on back to sitting on the side of a flat bed without bedrails? 2  -XOCHITL     Moving to and from a bed to a chair (including a wheelchair)? 3  -XOCHITL     Standing up from a chair using your arms (e.g., wheelchair, bedside chair)? 3  -XOCHITL     Climbing 3-5 steps with a railing? 2  -XOCHITL     To walk in hospital room? 3  -XOCHITL     AM-PAC 6 Clicks Score (PT) 15  -XOCHITL     Highest level of mobility 4 --> Transferred to chair/commode  -XOCHITL       Row Name 08/30/23 0925          Functional Assessment    Outcome Measure Options AM-PAC 6 Clicks Basic Mobility (PT)  -XOCHITL               User Key  (r) = Recorded By, (t) = Taken By, (c) = Cosigned By      Initials Name Provider Type    Kevin Phillips PT DPT Physical Therapist                                 Physical Therapy Education       Title: PT OT SLP Therapies (In Progress)       Topic: Physical Therapy (In Progress)       Point: Mobility training (Done)       Learning Progress Summary             Patient Acceptance, E, VU,DU,NR by XOCHITL at 8/30/2023 0925    Comment: benefits of activity, progression of PT, sternal prec                         Point: Home exercise program (Not Started)       Learner Progress:  Not documented in this visit.              Point: Body mechanics (Not Started)       Learner Progress:  Not documented in this visit.              Point: Precautions (Done)       Learning Progress Summary             Patient Acceptance, E, VU,DU,NR by XOCHITL at 8/30/2023 0925    Comment: benefits of activity, progression of PT, sternal prec                                         User Key        Initials Effective Dates Name Provider Type Discipline    XOCHITL 02/03/23 -  Kevin Maxwell, PT DPT Physical Therapist PT                  PT Recommendation and Plan  Planned Therapy Interventions (PT): bed mobility training, transfer training, gait training, balance training, home exercise program, patient/family education, postural re-education, stair training, strengthening  Plan of Care Reviewed With: patient  Outcome Evaluation: PT eval complete. He is alert and oriented x 4. He was educated on sternal precautions. Needs min assist to stand and to ambulate. He was able to ambulate 200 ft around the unuit. Demos a good pace with gait. Minimal reports of pain. HR to 115 with activity. PT will cont to progress mobility, balance, and strength. I anticipate he will cont to recover and d.c home with s.o.     Time Calculation:         PT Charges       Row Name 08/30/23 1019             Time Calculation    Start Time 0925  -XOCHITL      Stop Time 1010  + 10 minutes with chart review. PT with 55 total minutes  -XOCHITL      Time Calculation (min) 45 min  -XOCHITL      PT Received On 08/30/23  -XOCHITL      PT Goal Re-Cert Due Date 09/09/23  -XOCHITL         Time Calculation- PT    Total Timed Code Minutes- PT 55 minute(s)  -XOCHITL                User Key  (r) = Recorded By, (t) = Taken By, (c) = Cosigned By      Initials Name Provider Type    Kevin Phillips, PT DPT Physical Therapist                  Therapy Charges for Today       Code Description Service Date Service Provider Modifiers Qty    [id number] HC PT EVAL MOD COMPLEXITY 4 8/30/2023 Kevin Maxwell PT DPT GP 1            PT G-Codes  Outcome Measure Options: AM-PAC 6 Clicks Basic Mobility (PT)  AM-PAC 6 Clicks Score (PT): 15  PT Discharge Summary  Anticipated Discharge Disposition (PT): home with assist    Kevin Maxwell, RAEANN DPT  8/30/2023

## 2023-08-30 NOTE — THERAPY TREATMENT NOTE
Acute Care - Physical Therapy Treatment Note  [facility]     Patient Name: Nehemiah Gomez  : 1945  MRN: [id number]  Today's Date: 2023      Visit Dx:     ICD-10-CM ICD-9-CM   1. Impaired mobility [Z74.09 (ICD-10-CM)]  Z74.09 799.89   2. Coronary 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 heart with angina pectoris  I25.119 414.01     413.9     Patient Active Problem List   Diagnosis    Hypertension    GERD without esophagitis    Hyperlipidemia    Erectile dysfunction    Elevated prostate specific antigen (PSA)    BPH (benign prostatic hypertrophy) with urinary obstruction    Cough    Bronchitis    Primary insomnia    Benign prostatic hyperplasia with lower urinary tract symptoms    Prostate cancer    Scabies    Coronary 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 heart with angina pectoris    Abnormal stress test    Left main coronary artery disease    CAD (coronary artery disease), native coronary artery    Coronary 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 heart     Past Medical History:   Diagnosis Date    Coronary 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 heart with angina pectoris 2023    Diverticulosis     Erectile disorder due to 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     GERD (gastroesophageal reflux disease)     Heart murmur     Hyperlipidemia     Hypertension     Insomnia     Kidney stone     PONV (postoperative nausea and vomiting)     Prostate cancer      Past Surgical History:   Procedure Laterality Date    CARDIAC CATHETERIZATION N/A 2023    Procedure: Left Heart Cath;  Surgeon: Blayne Garcia MD;  Location: [facility] INVASIVE LOCATION;  Service: Cardiology;  Laterality: N/A;    CHOLECYSTECTOMY      COLONOSCOPY  2006    left sided diverticulosis    ENDOSCOPY  2006    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;s distal esophagus    PROSTATE BIOPSY N/A 2020    Procedure: PROSTATE ULTRASOUND URONAV FUSION BIOPSY.;  Surgeon:  Watson Sheehan MD;  Location:  [facility] OR;  Service: Urology;  Laterality: N/A;     PT Assessment (last 12 hours)       PT Evaluation and Treatment       Row Name 08/30/23 1257          Physical Therapy Time and Intention    Subjective Information complains of;pain  -AB     Document Type therapy note (daily note)  -AB     Mode of Treatment physical therapy  -AB       Row Name 08/30/23 1257          General Information    Existing Precautions/Restrictions fall;sternal;oxygen therapy device and L/min  chest tube x 2  -AB       Row Name 08/30/23 1257          Pain    Pretreatment Pain Rating 5/10  -AB     Posttreatment Pain Rating 6/10  -AB     Pain Location incisional  -AB     Pain Location - chest  -AB       Row Name 08/30/23 1257          Bed Mobility    Comment, (Bed Mobility) in chair  -AB       Row Name 08/30/23 1257          Sit-Stand Transfer    Sit-Stand [place] (Transfers) verbal cues;minimum assist (75% patient effort)  -AB       Row Name 08/30/23 1257          Gait/Stairs (Locomotion)    [place] Level (Gait) verbal cues;contact guard  -AB     Distance in Feet (Gait) 200' with 1 standing rest  -AB       Row Name 08/30/23 1257          Motor Skills    Comments, Therapeutic Exercise cardiac 10 reps  -AB     Additional Documentation Comments, Therapeutic Exercise (Row)  -AB       Row Name             Wound 08/29/23 0922 sternal Incision    Wound - Properties Group Placement Date: 08/29/23  - Placement Time: 0922 -LH Present on Hospital Admission: N  -LH Location: sternal  -LH Primary Wound Type: Incision  -LH Additional Comments: mastisol, steri strips, mepilex  -LH    Retired Wound - Properties Group Placement Date: 08/29/23  - Placement Time: 0922 -LH Present on Hospital Admission: N  -LH Location: sternal  -LH Primary Wound Type: Incision  -LH Additional Comments: mastisol, steri strips, mepilex  -LH    Retired Wound - Properties Group Date first assessed: 08/29/23  - Time first assessed:  0922 -LH Present on Hospital Admission: N  -LH Location: sternal  -LH Primary Wound Type: Incision  -LH Additional Comments: mastisol, steri strips, mepilex  -LH      Row Name             Wound 08/29/23 0922 Right lower leg Incision    Wound - Properties Group Placement Date: 08/29/23 - Placement Time: 0922 -LH Present on Hospital Admission: N  -LH Side: Right  -LH Orientation: lower  -LH Location: leg  -LH Primary Wound Type: Incision  -LH Additional Comments: mastisol, steristrips, mepilex, ace  -LH    Retired Wound - Properties Group Placement Date: 08/29/23  - Placement Time: 0922 -LH Present on Hospital Admission: N  -LH Side: Right  -LH Orientation: lower  -LH Location: leg  -LH Primary Wound Type: Incision  -LH Additional Comments: mastisol, steristrips, mepilex, ace  -LH    Retired Wound - Properties Group Date first assessed: 08/29/23 - Time first assessed: 0922 -LH Present on Hospital Admission: N  -LH Side: Right  -LH Location: leg  -LH Primary Wound Type: Incision  -LH Additional Comments: mastisol, steristrips, mepilex, ace  -LH      Row Name 08/30/23 1257          Vital Signs    Pre Systolic BP Rehab 124  -AB     Pre Treatment Diastolic BP 71  -AB     Post Systolic BP Rehab 111  -AB     Post Treatment Diastolic BP 61  -AB     Pretreatment Heart Rate (beats/min) 83  -AB     Intratreatment Heart Rate (beats/min) 98  -AB     Posttreatment Heart Rate (beats/min) 89  -AB     Pre SpO2 (%) 97  -AB     O2 Delivery Pre Treatment nasal cannula  -AB     O2 Delivery Intra Treatment nasal cannula  -AB     Post SpO2 (%) 98  -AB     O2 Delivery Post Treatment nasal cannula  -AB       Row Name 08/30/23 1257          Positioning and Restraints    Pre-Treatment Position sitting in chair/recliner  -AB     Post Treatment Position chair  -AB     In Chair sitting;call light within reach;with nsg  -AB               User Key  (r) = Recorded By, (t) = Taken By, (c) = Cosigned By      Initials Name Provider Type     Sylvia Webber PTA Physical Therapist Assistant    Vilma Haider RN Registered Nurse                    Physical Therapy Education       Title: PT OT SLP Therapies (In Progress)       Topic: Physical Therapy (In Progress)       Point: Mobility training (Done)       Learning Progress Summary             Patient Acceptance, E, VU,DU,NR by XOCHITL at 8/30/2023 0925    Comment: benefits of activity, progression of PT, sternal prec                         Point: Home exercise program (Not Started)       Learner Progress:  Not documented in this visit.              Point: Body mechanics (Not Started)       Learner Progress:  Not documented in this visit.              Point: Precautions (Done)       Learning Progress Summary             Patient Acceptance, E, VU,DU,NR by XOCHITL at 8/30/2023 0925    Comment: benefits of activity, progression of PT, sternal prec                                         User Key       Initials Effective Dates Name Provider Type Discipline    XOCHITL 02/03/23 -  Kevin Maxwell, PT DPT Physical Therapist PT                  PT Recommendation and Plan             Time Calculation:    PT Charges       Row Name 08/30/23 1257 08/30/23 1019          Time Calculation    Start Time 1257  -AB 0925  -XOCHITL     Stop Time 1320  -AB 1010  + 10 minutes with chart review. PT with 55 total minutes  -XOCHITL     Time Calculation (min) 23 min  -AB 45 min  -XOCHITL     PT Received On 08/30/23  -AB 08/30/23  -XOCHITL     PT Goal Re-Cert Due Date -- 09/09/23  -XOCHITL        Time Calculation- PT    Total Timed Code Minutes- PT 23 minute(s)  -AB 55 minute(s)  -XOCHITL               User Key  (r) = Recorded By, (t) = Taken By, (c) = Cosigned By      Initials Name Provider Type    Sylvia Webber PTA Physical Therapist Assistant    Kevin Phillips, PT DPT Physical Therapist                      PT G-Codes  Outcome Measure Options: AM-PAC 6 Clicks Basic Mobility (PT)  AM-PAC 6 Clicks Score (PT): 15    Sylvia Gongora  PTA  8/30/2023

## 2023-08-30 NOTE — PLAN OF CARE
Goal Outcome Evaluation:  Plan of Care Reviewed With: patient           Outcome Evaluation: PT eval complete. He is alert and oriented x 4. He was educated on sternal precautions. Needs min assist to stand and to ambulate. He was able to ambulate 200 ft around the unuit. Demos a good pace with gait. Minimal reports of pain. HR to 115 with activity. PT will cont to progress mobility, balance, and strength. I anticipate he will cont to recover and d.c home with s.o.      Anticipated Discharge Disposition (PT): home with assist

## 2023-08-30 NOTE — PROGRESS NOTES
"Patient name: Nehemiah Gomez  Patient : 1945  VISIT # [id number]  MR #[id number]    Procedure:Procedure(s):  CORONARY ARTERY BYPASS GRAFTING X2 WITH INTERNAL MAMMARY ARTERY GRAFT, RIGHT LEG OPEN VEIN HARVEST, TRANSESOPHAGEAL ECHOCARDIOGRAM  Procedure Date:2023  POD:1 Day Post-Op    Subjective   Extubated overnight. Sitting up in the recliner tolerating 2 L nasal cannula. Pain is well controlled. Nurse reports nausea and vomiting throughout the night. Weight is up 6 lbs over the last 24 hours.     IV drips: Cardene, IVF, nitro  Telemetry: Sinus 90s-100s      Objective     Visit Vitals  /68   Pulse 101   Temp 98.1 °F (36.7 °C) (Oral)   Resp 14   Ht 172 cm (67.72\")   Wt 88.6 kg (195 lb 6.4 oz)   SpO2 95%   BMI 29.96 kg/m²       Intake/Output Summary (Last 24 hours) at 2023 0758  Last data filed at 2023 0600  Gross per 24 hour   Intake 6297.4 ml   Output 4200 ml   Net 2097.4 ml     Mediastinal chest tube: 540 ml/24 hr, serosanguinous  Pleural chest tube: 55 ml/24 hr, serosanguinous    Lab:     CBC:  Results from last 7 days   Lab Units 23  0326 23  1554 23  1210   WBC 10*3/mm3 12.73* 5.30 10.49   HEMATOCRIT % 33.8* 27.9* 34.6*   PLATELETS 10*3/mm3 142 106* 145          BMP:  Results from last 7 days   Lab Units 23  0326 23  1554 23  1215 23  1210   SODIUM mmol/L 143 143  --  141   SODIUM, ARTERIAL mmol/L  --   --  141  --    POTASSIUM mmol/L 3.7 3.6  --  4.3   CHLORIDE mmol/L 109* 110*  --  108*   CO2 mmol/L 18.0* 24.0  --  24.0   GLUCOSE mg/dL 201* 145*  --  125*   BUN mg/dL 12 13  --  13   CREATININE mg/dL 0.95 0.88  --  0.89          COAG:  Results from last 7 days   Lab Units 23  0326 23  1210   INR  1.25* 1.25*   APTT seconds  --  37.4*       IMAGES:       Imaging Results (Last 24 Hours)       Procedure Component Value Units Date/Time    XR Chest 1 View [[id number]] Collected: 23     Updated: 23    Narrative:   "    EXAMINATION:  XR CHEST 1 VW-  8/30/2023 3:15 AM CDT     HISTORY: Postop heart surgery.     COMPARISON: 08/29/2023.     TECHNIQUE: Single view AP image.     FINDINGS:  The patient is now extubated. The [place]-Ty catheter has been  removed. No other line or tube changes. There is hypoventilation with  vascular crowding. There is mild atelectasis in the perihilar regions  and lung bases. There is minimal blunting of the left costophrenic  angle. The heart is normal in size. Status post median sternotomy. No  other acute bony abnormality is seen.          Impression:      1. Hypoventilation with vascular crowding.  2. Atelectasis in the perihilar regions and lung bases. Minimal blunting  of the left costophrenic angle.        This report was finalized on 08/30/2023 07:03 by Dr. Carlos Bell MD.    XR Chest 1 View [[id number]] Collected: 08/29/23 1333     Updated: 08/29/23 1337    Narrative:      HISTORY: Postop line and tube assessment     CXR: A frontal view the chest obtained.     COMPARISON: 08/20/2023     FINDINGS: Endotracheal tube appropriate in position. Right IJ [place]-Ty  catheter tip within the distal main versus proximal right pulmonary  artery. Surgical drains. Sternotomy wires. Epicardial pacer leads.     Diminished level of inspiration. Patchy basilar atelectasis. Trace left  pleural effusion. No pneumothorax. No acute regional bony pathology.       Impression:      1. Appropriate positioning life support lines. Trace left pleural  effusion with probable patchy basilar atelectasis.  This report was finalized on 08/29/2023 13:34 by Dr. Trina Bucio MD.        My interpretation of chest x-ray: Support lines in appropriate position, trace left pleural effusion. No visible pneumothorax.      Physical Exam:  General: Alert, oriented. No apparent distress.   Cardiovascular: Regular rate and rhythm without murmur, rubs, or gallops.    Pulmonary: Clear to auscultation bilaterally without wheezing, rubs, or  rales.  Chest: Sternotomy incision clean, dry, and intact. Sternum stable. No clicks. Mediastinal chest tube x 2, pleural chest tube x 1.  Fluid serosanguineous   Abdomen: Soft, non distended, and non tender.  Extremities: Warm, moves all extremities. No edema. Saphenectomy site clean, dry, and intact.   Neurologic:  Grossly intact with no focal deficits.         Impression:  Coronary 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 heart with unstable angina  Hypertension  Hyperlipidemia  Former Smoker  Overweight, BMI 29.1  GERD  Hx of Prostate Cancer    Plan:  Repeat labs at 1300  Start diuresis and potassium replacement, Lasix 20 mg IV and potassium 20 mEq PO once today   Reglan 10 mg IV x 4 doses  Increase Lopressor to 25 mg twice daily   Discontinue nitroglycerin gtt  Wean Cardene gtt as tolerated  Wean oxygen as tolerated  Routine cardiac surgery care   Pulmonary toilet and ambulation         Heike Strange, NICANOR  08/30/23  07:58 CDT

## 2023-08-30 NOTE — PROGRESS NOTES
RT EQUIPMENT DEVICE RELATED - SKIN ASSESSMENT    RT Medical Equipment/Device:     ETT Wagner/Anchorfast    Skin Assessment:      Cheek:  Intact  Neck:  Intact  Mouth:  Intact    Device Skin Pressure Protection:  Skin-to-device areas padded:  Anchorfast    Nurse Notification:  No    Vilma Keith, RRT

## 2023-08-30 NOTE — CASE MANAGEMENT/SOCIAL WORK
Discharge Planning Assessment  [facility]     Patient Name: Nehemiah Gomez  MRN: [id number]  Today's Date: 8/30/2023    Admit Date: 8/29/2023        Discharge Needs Assessment       Row Name 08/30/23 1040       Living Environment    People in Home significant other    Name(s) of People in Home Jamila    Current Living Arrangements home    Primary Care Provided by self    Provides Primary Care For no one    Family Caregiver if Needed significant other    Family Caregiver Names Jamila    Able to Return to Prior Arrangements yes       Resource/Environmental Concerns    Resource/Environmental Concerns none       Transition Planning    Patient/Family Anticipates Transition to home with family    Transportation Anticipated family or friend will provide       Discharge Needs Assessment    Readmission Within the Last 30 Days no previous admission in last 30 days    Equipment Currently Used at Home none    Concerns to be Addressed no discharge needs identified    Equipment Needed After Discharge none    Discharge Coordination/Progress spoke to patient who lives with significant other; has RX coverage and PCP; independent at home prior to illness will follow for DC needs                   Discharge Plan    No documentation.                 Continued Care and Services - Admitted Since 8/29/2023    Coordination has not been started for this encounter.          Demographic Summary    No documentation.                  Functional Status    No documentation.                  Psychosocial    No documentation.                  Abuse/Neglect    No documentation.                  Legal    No documentation.                  Substance Abuse    No documentation.                  Patient Forms    No documentation.                     Heike Richards, RN

## 2023-08-31 ENCOUNTER — APPOINTMENT (OUTPATIENT)
Dept: GENERAL RADIOLOGY | Facility: HOSPITAL | Age: 78
DRG: 236 | End: 2023-08-31
Payer: MEDICARE

## 2023-08-31 LAB
ANION GAP SERPL CALCULATED.3IONS-SCNC: 8 MMOL/L (ref 5–15)
ANION GAP SERPL CALCULATED.3IONS-SCNC: 9 MMOL/L (ref 5–15)
BUN SERPL-MCNC: 18 MG/DL (ref 8–23)
BUN SERPL-MCNC: 20 MG/DL (ref 8–23)
BUN/CREAT SERPL: 18.4 (ref 7–25)
BUN/CREAT SERPL: 24.7 (ref 7–25)
CALCIUM SPEC-SCNC: 8.9 MG/DL (ref 8.6–10.5)
CALCIUM SPEC-SCNC: 8.9 MG/DL (ref 8.6–10.5)
CHLORIDE SERPL-SCNC: 102 MMOL/L (ref 98–107)
CHLORIDE SERPL-SCNC: 106 MMOL/L (ref 98–107)
CO2 SERPL-SCNC: 25 MMOL/L (ref 22–29)
CO2 SERPL-SCNC: 26 MMOL/L (ref 22–29)
CREAT SERPL-MCNC: 0.81 MG/DL (ref 0.76–1.27)
CREAT SERPL-MCNC: 0.98 MG/DL (ref 0.76–1.27)
DEPRECATED RDW RBC AUTO: 44.8 FL (ref 37–54)
EGFRCR SERPLBLD CKD-EPI 2021: 79.9 ML/MIN/1.73
EGFRCR SERPLBLD CKD-EPI 2021: 91.4 ML/MIN/1.73
ERYTHROCYTE [DISTWIDTH] IN BLOOD BY AUTOMATED COUNT: 13.4 % (ref 12.3–15.4)
GLUCOSE BLDC GLUCOMTR-MCNC: 117 MG/DL (ref 70–130)
GLUCOSE BLDC GLUCOMTR-MCNC: 119 MG/DL (ref 70–130)
GLUCOSE BLDC GLUCOMTR-MCNC: 139 MG/DL (ref 70–130)
GLUCOSE BLDC GLUCOMTR-MCNC: 161 MG/DL (ref 70–130)
GLUCOSE SERPL-MCNC: 127 MG/DL (ref 65–99)
GLUCOSE SERPL-MCNC: 130 MG/DL (ref 65–99)
HCT VFR BLD AUTO: 38.3 % (ref 37.5–51)
HGB BLD-MCNC: 12 G/DL (ref 13–17.7)
MCH RBC QN AUTO: 28.9 PG (ref 26.6–33)
MCHC RBC AUTO-ENTMCNC: 31.3 G/DL (ref 31.5–35.7)
MCV RBC AUTO: 92.3 FL (ref 79–97)
PLATELET # BLD AUTO: 166 10*3/MM3 (ref 140–450)
PMV BLD AUTO: 11.9 FL (ref 6–12)
POTASSIUM SERPL-SCNC: 4.2 MMOL/L (ref 3.5–5.2)
POTASSIUM SERPL-SCNC: 4.8 MMOL/L (ref 3.5–5.2)
RBC # BLD AUTO: 4.15 10*6/MM3 (ref 4.14–5.8)
SODIUM SERPL-SCNC: 136 MMOL/L (ref 136–145)
SODIUM SERPL-SCNC: 140 MMOL/L (ref 136–145)
WBC NRBC COR # BLD: 15.77 10*3/MM3 (ref 3.4–10.8)

## 2023-08-31 PROCEDURE — 80048 BASIC METABOLIC PNL TOTAL CA: CPT

## 2023-08-31 PROCEDURE — 25010000002 CEFAZOLIN PER 500 MG: Performed by: NURSE PRACTITIONER

## 2023-08-31 PROCEDURE — 85027 COMPLETE CBC AUTOMATED: CPT | Performed by: NURSE PRACTITIONER

## 2023-08-31 PROCEDURE — 97116 GAIT TRAINING THERAPY: CPT

## 2023-08-31 PROCEDURE — 63710000001 INSULIN LISPRO (HUMAN) PER 5 UNITS: Performed by: NURSE PRACTITIONER

## 2023-08-31 PROCEDURE — 80048 BASIC METABOLIC PNL TOTAL CA: CPT | Performed by: NURSE PRACTITIONER

## 2023-08-31 PROCEDURE — 82948 REAGENT STRIP/BLOOD GLUCOSE: CPT

## 2023-08-31 PROCEDURE — 97110 THERAPEUTIC EXERCISES: CPT

## 2023-08-31 PROCEDURE — 93005 ELECTROCARDIOGRAM TRACING: CPT | Performed by: SURGERY

## 2023-08-31 PROCEDURE — 93010 ELECTROCARDIOGRAM REPORT: CPT | Performed by: INTERNAL MEDICINE

## 2023-08-31 PROCEDURE — 71045 X-RAY EXAM CHEST 1 VIEW: CPT

## 2023-08-31 PROCEDURE — 25010000002 FUROSEMIDE PER 20 MG

## 2023-08-31 PROCEDURE — 25010000002 ENOXAPARIN PER 10 MG: Performed by: NURSE PRACTITIONER

## 2023-08-31 PROCEDURE — 25010000002 AMIODARONE IN DEXTROSE 5% 360-4.14 MG/200ML-% SOLUTION

## 2023-08-31 PROCEDURE — 25010000002 METOCLOPRAMIDE PER 10 MG: Performed by: NURSE PRACTITIONER

## 2023-08-31 RX ORDER — POTASSIUM CHLORIDE 750 MG/1
20 CAPSULE, EXTENDED RELEASE ORAL
Status: DISCONTINUED | OUTPATIENT
Start: 2023-08-31 | End: 2023-09-03 | Stop reason: HOSPADM

## 2023-08-31 RX ORDER — FUROSEMIDE 10 MG/ML
20 INJECTION INTRAMUSCULAR; INTRAVENOUS
Status: DISCONTINUED | OUTPATIENT
Start: 2023-08-31 | End: 2023-09-03 | Stop reason: HOSPADM

## 2023-08-31 RX ORDER — AMIODARONE HYDROCHLORIDE 200 MG/1
400 TABLET ORAL 2 TIMES DAILY WITH MEALS
Status: DISCONTINUED | OUTPATIENT
Start: 2023-08-31 | End: 2023-09-03 | Stop reason: HOSPADM

## 2023-08-31 RX ORDER — METOPROLOL TARTRATE 50 MG/1
50 TABLET, FILM COATED ORAL EVERY 12 HOURS SCHEDULED
Status: DISCONTINUED | OUTPATIENT
Start: 2023-08-31 | End: 2023-09-03 | Stop reason: HOSPADM

## 2023-08-31 RX ADMIN — BISACODYL 10 MG: 5 TABLET ORAL at 21:45

## 2023-08-31 RX ADMIN — METOPROLOL TARTRATE 50 MG: 50 TABLET, FILM COATED ORAL at 21:45

## 2023-08-31 RX ADMIN — FUROSEMIDE 20 MG: 10 INJECTION, SOLUTION INTRAMUSCULAR; INTRAVENOUS at 09:34

## 2023-08-31 RX ADMIN — BISACODYL 10 MG: 5 TABLET ORAL at 08:14

## 2023-08-31 RX ADMIN — METOPROLOL TARTRATE 25 MG: 25 TABLET, FILM COATED ORAL at 08:14

## 2023-08-31 RX ADMIN — POLYETHYLENE GLYCOL 3350 17 G: 17 POWDER, FOR SOLUTION ORAL at 08:14

## 2023-08-31 RX ADMIN — ACETAMINOPHEN 650 MG: 325 TABLET, FILM COATED ORAL at 08:14

## 2023-08-31 RX ADMIN — CEFAZOLIN 2 G: 2 INJECTION, POWDER, FOR SOLUTION INTRAMUSCULAR; INTRAVENOUS at 01:47

## 2023-08-31 RX ADMIN — AMIODARONE HYDROCHLORIDE 400 MG: 200 TABLET ORAL at 18:12

## 2023-08-31 RX ADMIN — ENOXAPARIN SODIUM 40 MG: 100 INJECTION SUBCUTANEOUS at 08:15

## 2023-08-31 RX ADMIN — ACETAMINOPHEN 650 MG: 325 TABLET, FILM COATED ORAL at 01:48

## 2023-08-31 RX ADMIN — PANTOPRAZOLE SODIUM 40 MG: 40 TABLET, DELAYED RELEASE ORAL at 06:25

## 2023-08-31 RX ADMIN — INSULIN LISPRO 2 UNITS: 100 INJECTION, SOLUTION INTRAVENOUS; SUBCUTANEOUS at 21:47

## 2023-08-31 RX ADMIN — Medication 1 APPLICATION: at 06:24

## 2023-08-31 RX ADMIN — CLOPIDOGREL BISULFATE 75 MG: 75 TABLET, FILM COATED ORAL at 18:12

## 2023-08-31 RX ADMIN — ASPIRIN 81 MG: 81 TABLET, COATED ORAL at 08:14

## 2023-08-31 RX ADMIN — OXYCODONE HYDROCHLORIDE 5 MG: 5 TABLET ORAL at 21:44

## 2023-08-31 RX ADMIN — LISINOPRIL 10 MG: 10 TABLET ORAL at 08:14

## 2023-08-31 RX ADMIN — OXYCODONE HYDROCHLORIDE 10 MG: 5 TABLET ORAL at 01:57

## 2023-08-31 RX ADMIN — AMIODARONE HYDROCHLORIDE 1 MG/MIN: 1.8 INJECTION, SOLUTION INTRAVENOUS at 15:13

## 2023-08-31 RX ADMIN — CHLORHEXIDINE GLUCONATE 15 ML: 1.2 SOLUTION ORAL at 06:24

## 2023-08-31 RX ADMIN — METOPROLOL TARTRATE 12.5 MG: 25 TABLET, FILM COATED ORAL at 09:34

## 2023-08-31 RX ADMIN — ACETAMINOPHEN 650 MG: 325 TABLET, FILM COATED ORAL at 12:42

## 2023-08-31 RX ADMIN — FUROSEMIDE 20 MG: 10 INJECTION, SOLUTION INTRAMUSCULAR; INTRAVENOUS at 18:13

## 2023-08-31 RX ADMIN — ATORVASTATIN CALCIUM 20 MG: 10 TABLET, FILM COATED ORAL at 21:45

## 2023-08-31 RX ADMIN — AMIODARONE HYDROCHLORIDE 0.5 MG/MIN: 1.8 INJECTION, SOLUTION INTRAVENOUS at 21:45

## 2023-08-31 RX ADMIN — OXYCODONE HYDROCHLORIDE 10 MG: 5 TABLET ORAL at 06:24

## 2023-08-31 RX ADMIN — OXYCODONE HYDROCHLORIDE 5 MG: 5 TABLET ORAL at 12:42

## 2023-08-31 RX ADMIN — POTASSIUM CHLORIDE 20 MEQ: 10 CAPSULE, COATED, EXTENDED RELEASE ORAL at 18:12

## 2023-08-31 RX ADMIN — METOCLOPRAMIDE HYDROCHLORIDE 10 MG: 5 INJECTION INTRAMUSCULAR; INTRAVENOUS at 02:24

## 2023-08-31 RX ADMIN — POTASSIUM CHLORIDE 20 MEQ: 10 CAPSULE, COATED, EXTENDED RELEASE ORAL at 09:34

## 2023-08-31 NOTE — THERAPY TREATMENT NOTE
Acute Care - Physical Therapy Treatment Note  [facility]     Patient Name: Nehemiah Gomez  : 1945  MRN: [id number]  Today's Date: 2023      Visit Dx:     ICD-10-CM ICD-9-CM   1. Impaired mobility [Z74.09 (ICD-10-CM)]  Z74.09 799.89   2. Coronary 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 heart with angina pectoris  I25.119 414.01     413.9     Patient Active Problem List   Diagnosis    Hypertension    GERD without esophagitis    Hyperlipidemia    Erectile dysfunction    Elevated prostate specific antigen (PSA)    BPH (benign prostatic hypertrophy) with urinary obstruction    Cough    Bronchitis    Primary insomnia    Benign prostatic hyperplasia with lower urinary tract symptoms    Prostate cancer    Scabies    Coronary 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 heart with angina pectoris    Abnormal stress test    Left main coronary artery disease    CAD (coronary artery disease), native coronary artery    Coronary 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 heart     Past Medical History:   Diagnosis Date    Coronary 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 heart with angina pectoris 2023    Diverticulosis     Erectile disorder due to 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     GERD (gastroesophageal reflux disease)     Heart murmur     Hyperlipidemia     Hypertension     Insomnia     Kidney stone     PONV (postoperative nausea and vomiting)     Prostate cancer      Past Surgical History:   Procedure Laterality Date    CARDIAC CATHETERIZATION N/A 2023    Procedure: Left Heart Cath;  Surgeon: Blayne Garcia MD;  Location: [facility] INVASIVE LOC[facility];  Service: Cardiology;  Laterality: N/A;    CHOLECYSTECTOMY      COLONOSCOPY  2006    left sided diverticulosis    CORONARY ARTERY BYPASS GRAFT N/A 2023    Procedure: CORONARY ARTERY BYPASS GRAFTING X2 WITH INTERNAL MAMMARY ARTERY GRAFT, RIGHT LEG OPEN VEIN HARVEST,  TRANSESOPHAGEAL ECHOCARDIOGRAM;  Surgeon: Ernesto Cheema MD;  Location:  [facility] OR;  Service: Cardiothoracic;  Laterality: N/A;    ENDOSCOPY  03/20/2006    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;s distal esophagus    PROSTATE BIOPSY N/A 06/29/2020    Procedure: PROSTATE ULTRASOUND URONAV FUSION BIOPSY.;  Surgeon: Watson Sheehan MD;  Location:  [facility] OR;  Service: Urology;  Laterality: N/A;     PT Assessment (last 12 hours)       PT Evaluation and Treatment       Row Name 08/31/23 0954          Physical Therapy Time and Intention    Subjective Information complains of;weakness;fatigue  -AB     Document Type therapy note (daily note)  -AB     Mode of Treatment physical therapy  -AB       Row Name 08/31/23 0954          General Information    Existing Precautions/Restrictions fall;sternal;oxygen therapy device and L/min  chest tube x 2  -AB       Row Name 08/31/23 0954          Bed Mobility    Comment, (Bed Mobility) in chair  -AB       Row Name 08/31/23 0954          Sit-Stand Transfer    Sit-Stand [place] (Transfers) verbal cues;contact guard  -AB       Row Name 08/31/23 0954          Gait/Stairs (Locomotion)    [place] Level (Gait) verbal cues;contact guard  -AB     Distance in Feet (Gait) 300'  -AB     Deviations/Abnormal Patterns (Gait) gait speed decreased  -AB       Row Name 08/31/23 0954          Motor Skills    Comments, Therapeutic Exercise Cardiac 15 reps  -AB       Row Name             Wound 08/29/23 0922 sternal Incision    Wound - Properties Group Placement Date: 08/29/23  [facility] Placement Time: 0922 -LH Present on Hospital Admission: N  -LH Location: sternal  -LH Primary Wound Type: Incision  -LH Additional Comments: mastisol, steri strips, mepilex  -LH    Retired Wound - Properties Group Placement Date: 08/29/23  - Placement Time: 0922 - Present on Hospital Admission: N  -LH Location: sternal  -LH Primary Wound Type: Incision  -LH Additional Comments: mastisol, steri strips, mepilex  -LH    Retired Wound -  Properties Group Date first assessed: 08/29/23 - Time first assessed: 0922 -LH Present on Hospital Admission: N  -LH Location: sternal  -LH Primary Wound Type: Incision  -LH Additional Comments: mastisol, steri strips, mepilex  -LH      Row Name             Wound 08/29/23 0922 Right lower leg Incision    Wound - Properties Group Placement Date: 08/29/23  - Placement Time: 0922 -LH Present on Hospital Admission: N  -LH Side: Right  -LH Orientation: lower  -LH Location: leg  -LH Primary Wound Type: Incision  -LH Additional Comments: mastisol, steristrips, mepilex, ace  -LH    Retired Wound - Properties Group Placement Date: 08/29/23  - Placement Time: 0922 -LH Present on Hospital Admission: N  -LH Side: Right  -LH Orientation: lower  -LH Location: leg  -LH Primary Wound Type: Incision  -LH Additional Comments: mastisol, steristrips, mepilex, ace  -LH    Retired Wound - Properties Group Date first assessed: 08/29/23 - Time first assessed: 0922 -LH Present on Hospital Admission: N  -LH Side: Right  -LH Location: leg  -LH Primary Wound Type: Incision  -LH Additional Comments: mastisol, steristrips, mepilex, ace  -LH      Row Name 08/31/23 0954          Positioning and Restraints    Pre-Treatment Position sitting in chair/recliner  -AB     Post Treatment Position bathroom  -AB     Bathroom sitting;with nsg  -AB               User Key  (r) = Recorded By, (t) = Taken By, (c) = Cosigned By      Initials Name Provider Type    AB Sylvia Gongora PTA Physical Therapist Assistant     Vilma Oneil, RN Registered Nurse                    Physical Therapy Education       Title: PT OT SLP Therapies (In Progress)       Topic: Physical Therapy (In Progress)       Point: Mobility training (Done)       Learning Progress Summary             Patient Acceptance, E, IBETH,DU,NR by XOCHITL at 8/30/2023 0928    Comment: benefits of activity, progression of PT, sternal prec                         Point: Home exercise program (Not  Started)       Learner Progress:  Not documented in this visit.              Point: Body mechanics (Not Started)       Learner Progress:  Not documented in this visit.              Point: Precautions (Done)       Learning Progress Summary             Patient Acceptance, E, IBETH,EMMETT,NR by XOCHITL at 8/30/2023 0925    Comment: benefits of activity, progression of PT, sternal prec                                         User Key       Initials Effective Dates Name Provider Type Discipline    XOCHITL 02/03/23 -  Kevin Maxwell, PT DPT Physical Therapist PT                  PT Recommendation and Plan             Time Calculation:    PT Charges       Row Name 08/31/23 0954             Time Calculation    Start Time 0954  -AB      Stop Time 1018  -AB      Time Calculation (min) 24 min  -AB      PT Received On 08/31/23  -AB         Time Calculation- PT    Total Timed Code Minutes- PT 24 minute(s)  -AB                User Key  (r) = Recorded By, (t) = Taken By, (c) = Cosigned By      Initials Name Provider Type    AB Sylvia Gongora PTA Physical Therapist Assistant                  Therapy Charges for Today       Code Description Service Date Service Provider Modifiers Qty    77234356939 HC GAIT TRAINING EA 15 MIN 8/30/2023 Sylvia Gongora, PTA GP 1    42032351679 HC PT THER PROC EA 15 MIN 8/30/2023 Sylvia Gongora, PTA GP 1    51792234108 HC GAIT TRAINING EA 15 MIN 8/31/2023 Sylvia Gongora, PTA GP 1    14850958132 HC PT THER PROC EA 15 MIN 8/31/2023 Sylvia Gongora, PTA GP 1            PT G-Codes  Outcome Measure Options: AM-PAC 6 Clicks Basic Mobility (PT)  AM-PAC 6 Clicks Score (PT): 17    Sylvia Gongora PTA  8/31/2023

## 2023-08-31 NOTE — THERAPY DISCHARGE NOTE
Acute Care - Physical Therapy Discharge Summary  [facility]       Patient Name: Nehemiah Gomez  : 1945  MRN: [id number]    Today's Date: 2023                 Admit Date: 2023      PT Recommendation and Plan    Visit Dx:    ICD-10-CM ICD-9-CM   1. Impaired mobility [Z74.09 (ICD-10-CM)]  Z74.09 799.89   2. Coronary 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 heart with angina pectoris  I25.119 414.01     413.9            PT Charges       Row Name 23 0954             Time Calculation    Start Time 0954  -AB      Stop Time 1018  -AB      Time Calculation (min) 24 min  -AB      PT Received On 23  -AB         Time Calculation- PT    Total Timed Code Minutes- PT 24 minute(s)  -AB                User Key  (r) = Recorded By, (t) = Taken By, (c) = Cosigned By      Initials Name Provider Type    AB Sylvia Gongora PTA Physical Therapist Assistant                        Therapy Charges for Today       Code Description Service Date Service Provider Modifiers Qty    89569213586 HC GAIT TRAINING EA 15 MIN 2023 Sylvia Gongora PTA GP 1    28714583531 HC PT THER PROC EA 15 MIN 2023 Sylvia Gongora PTA GP 1                   Sylvia Gongora PTA   2023

## 2023-08-31 NOTE — PLAN OF CARE
Goal Outcome Evaluation:  Plan of Care Reviewed With: patient        Progress: no change  Outcome Evaluation: VSS, O2 sats WNL on RA, getting 1500 on IS this morning, drsg  to incisions c/d/i, prn pain medication given x3 this shift along with scheduled pain meds, MST had 100 out and pleural CT had 34 ml out, Sinus 81-96 on telemetry. Ambulated in marie last night for 3rd walk.

## 2023-08-31 NOTE — PROGRESS NOTES
"Patient name: Nehemiah Gomez  Patient : 1945  VISIT # [id number]  MR #[id number]    Procedure:Procedure(s):  CORONARY ARTERY BYPASS GRAFTING X2 WITH INTERNAL MAMMARY ARTERY GRAFT, RIGHT LEG OPEN VEIN HARVEST, TRANSESOPHAGEAL ECHOCARDIOGRAM  Procedure Date:2023  POD:2 Days Post-Op    Subjective   Sitting up in the recliner tolerating 1 L nasal cannula. Pain is well controlled. Nausea improved. Weight is down 2 lbs over the last 24 hours and up 6 lbs from his baseline weight. Reaching 1250 on incentive spirometer. No BM.    Nursing reports that when sternal incision dressing was removed a thick brown drainage was noted mid sternal incision. A scant amount was noted on assessment.     Telemetry: Sinus 90s-100s, approximately 1 min run of atrial fibrillation around 0730    Objective     Visit Vitals  /75 (BP Location: Left arm, Patient Position: Sitting)   Pulse 101   Temp 98.1 °F (36.7 °C) (Oral)   Resp 16   Ht 172 cm (67.72\")   Wt 88.6 kg (195 lb 6.4 oz)   SpO2 92%   BMI 29.96 kg/m²       Intake/Output Summary (Last 24 hours) at 2023 0914  Last data filed at 2023 0600  Gross per 24 hour   Intake 1076.7 ml   Output 739 ml   Net 337.7 ml     Mediastinal chest tube: 170 ml/24 hr, serosanguinous  Pleural chest tube: 39 ml/24 hr, serosanguinous    Lab:     CBC:  Results from last 7 days   Lab Units 23  0333 23  1317 23  0326   WBC 10*3/mm3 15.77* 17.76* 12.73*   HEMATOCRIT % 38.3 34.0* 33.8*   PLATELETS 10*3/mm3 166 167 142          BMP:  Results from last 7 days   Lab Units 23  0333 23  1317 23  1020 23  0326   SODIUM mmol/L 140 140  --  143   POTASSIUM mmol/L 4.8 4.1 3.4* 3.7   CHLORIDE mmol/L 106 106  --  109*   CO2 mmol/L 25.0 20.0*  --  18.0*   GLUCOSE mg/dL 130* 139*  --  201*   BUN mg/dL 18 13  --  12   CREATININE mg/dL 0.98 0.90  --  0.95          COAG:  Results from last 7 days   Lab Units 23  0326 23  1210   INR  1.25* 1.25* "   APTT seconds  --  37.4*       IMAGES:       Imaging Results (Last 24 Hours)       Procedure Component Value Units Date/Time    XR Chest 1 View [[id number]] Collected: 08/31/23 0703     Updated: 08/31/23 0707    Narrative:      EXAMINATION:  XR CHEST 1 VW-  8/31/2023 3:10 AM CDT     HISTORY: Status post heart surgery.     COMPARISON: 08/30/2023.     TECHNIQUE: Single view AP image.     FINDINGS:  The [place]-Ty introducer sheath has been removed. No other  tube changes. There is hypoventilation. There is vascular crowding.  There is patchy infiltrate at the left base. Heart size is upper limits  of normal. Prior median sternotomy. No other acute bony abnormality.          Impression:      1. Hypoventilation with vascular crowding.  2. Patchy infiltrate left lung base, likely atelectasis. There may be  minimal left pleural effusion.        This report was finalized on 08/31/2023 07:04 by Dr. Carlos Bell MD.        My interpretation of chest x-ray: Support lines in appropriate position. Vascular crowding. No visible pneumothorax.      Physical Exam:  General: Alert, oriented. No apparent distress.   Cardiovascular: Regular rate and rhythm without murmur, rubs, or gallops.    Pulmonary: Clear to auscultation bilaterally without wheezing, rubs, or rales.  Chest: Sternotomy incision clean and intact. Scant amount sanguinous drainage noted at the mid sternal region.  Sternum stable. No clicks. Mediastinal chest tube x 2, pleural chest tube x 1.  Fluid serosanguineous   Abdomen: Soft, non distended, and non tender.  Extremities: Warm, moves all extremities. No edema. Saphenectomy site clean, dry, and intact.   Neurologic:  Grossly intact with no focal deficits.         Impression:  Coronary 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 heart with unstable angina  Hypertension  Hyperlipidemia  Former Smoker  Overweight, BMI 29.1  GERD  Hx of Prostate Cancer    Plan:  Continue diuresis and potassium  replacement, Lasix 20 mg IV twice daily with potassium 20 mEq twice daily   Increase Lopressor to 37.5 mg twice daily   Suppository today if patient does not have a BM  Wean oxygen as tolerated  Routine cardiac surgery care   Pulmonary toilet and ambulation   Discussed with patient and nursing        Heike Strange, NICANOR  08/31/23  09:14 CDT

## 2023-08-31 NOTE — PLAN OF CARE
Goal Outcome Evaluation:  Plan of Care Reviewed With: patient, spouse        Progress: no change  Outcome Evaluation: VSS. Pt c/o minimal pain, medicated with PRN meds and schedule pain meds with relief. Has been on room air most of the afternoon. Went Afib rvr this afternoon, on IV amio protocol and started on PO afternoon this evening. Midsternal incision draining, Dr. Gonzalez assessed this afternoon, will cont to monitor. Chest tubes removed this afternoon. Wires taped and isolated. Good urine output after IV Lasix initiated this AM. Beta blocker increased to 50 mg bid to help with heart rate control. No BM today but passing gas. Cont to monitor overnight.

## 2023-08-31 NOTE — THERAPY TREATMENT NOTE
Acute Care - Physical Therapy Treatment Note  [facility]     Patient Name: Nehemiah Gomez  : 1945  MRN: [id number]  Today's Date: 2023      Visit Dx:     ICD-10-CM ICD-9-CM   1. Impaired mobility [Z74.09 (ICD-10-CM)]  Z74.09 799.89   2. Coronary 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 heart with angina pectoris  I25.119 414.01     413.9     Patient Active Problem List   Diagnosis    Hypertension    GERD without esophagitis    Hyperlipidemia    Erectile dysfunction    Elevated prostate specific antigen (PSA)    BPH (benign prostatic hypertrophy) with urinary obstruction    Cough    Bronchitis    Primary insomnia    Benign prostatic hyperplasia with lower urinary tract symptoms    Prostate cancer    Scabies    Coronary 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 heart with angina pectoris    Abnormal stress test    Left main coronary artery disease    CAD (coronary artery disease), native coronary artery    Coronary 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 heart     Past Medical History:   Diagnosis Date    Coronary 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 heart with angina pectoris 2023    Diverticulosis     Erectile disorder due to 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     GERD (gastroesophageal reflux disease)     Heart murmur     Hyperlipidemia     Hypertension     Insomnia     Kidney stone     PONV (postoperative nausea and vomiting)     Prostate cancer      Past Surgical History:   Procedure Laterality Date    CARDIAC CATHETERIZATION N/A 2023    Procedure: Left Heart Cath;  Surgeon: Blayne Garcia MD;  Location: [facility] INVASIVE LOCATION;  Service: Cardiology;  Laterality: N/A;    CHOLECYSTECTOMY      COLONOSCOPY  2006    left sided diverticulosis    CORONARY ARTERY BYPASS GRAFT N/A 2023    Procedure: CORONARY ARTERY BYPASS GRAFTING X2 WITH INTERNAL MAMMARY ARTERY GRAFT, RIGHT LEG OPEN VEIN HARVEST,  TRANSESOPHAGEAL ECHOCARDIOGRAM;  Surgeon: Ernesto Cheema MD;  Location:  [facility] OR;  Service: Cardiothoracic;  Laterality: N/A;    ENDOSCOPY  03/20/2006    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;s distal esophagus    PROSTATE BIOPSY N/A 06/29/2020    Procedure: PROSTATE ULTRASOUND URONAV FUSION BIOPSY.;  Surgeon: Watson Sheehan MD;  Location:  [facility] OR;  Service: Urology;  Laterality: N/A;     PT Assessment (last 12 hours)       PT Evaluation and Treatment       Row Name 08/31/23 [facility] 08/31/23 0954       Physical Therapy Time and Intention    Subjective Information no complaints  -AB complains of;weakness;fatigue  -AB    Document Type therapy note (daily note)  -AB therapy note (daily note)  -AB    Mode of Treatment physical therapy  -AB physical therapy  -AB      Row Name 08/31/23 [facility] 08/31/23 0954       General Information    Existing Precautions/Restrictions fall;sternal  chest tube x 2  -AB fall;sternal;oxygen therapy device and L/min  chest tube x 2  -AB      Row Name 08/31/23 [facility] 08/31/23 0954       Bed Mobility    Bed Mobility sit-supine;supine-sit-supine  -AB --    Sit-Supine [place] (Bed Mobility) verbal cues;minimum assist (75% patient effort)  -AB --    Supine-Sit-Supine [place] (Bed Mobility) --  in chair  -AB --    Comment, (Bed Mobility) -- in chair  -AB      Row Name 08/31/23 [facility] 08/31/23 0954       Sit-Stand Transfer    Sit-Stand [place] (Transfers) verbal cues;contact guard  -AB verbal cues;contact guard  -AB      Row Name 08/31/23 [facility] 08/31/23 0954       Gait/Stairs (Locomotion)    [place] Level (Gait) verbal cues;contact guard  -AB verbal cues;contact guard  -AB    Distance in Feet (Gait) 350'  -'  -AB    Deviations/Abnormal Patterns (Gait) gait speed decreased  -AB gait speed decreased  -AB      Row Name 08/31/23 0954          Motor Skills    Comments, Therapeutic Exercise Cardiac 15 reps  -AB       Row Name             Wound 08/29/23 0922 sternal Incision    Wound - Properties Group  Placement Date: 08/29/23  - Placement Time: 0922 -LH Present on Hospital Admission: N  -LH Location: sternal  -LH Primary Wound Type: Incision  -LH Additional Comments: mastisol, steri strips, mepilex  -LH    Retired Wound - Properties Group Placement Date: 08/29/23  - Placement Time: 0922 -LH Present on Hospital Admission: N  -LH Location: sternal  -LH Primary Wound Type: Incision  -LH Additional Comments: mastisol, steri strips, mepilex  -LH    Retired Wound - Properties Group Date first assessed: 08/29/23  - Time first assessed: 0922 -LH Present on Hospital Admission: N  -LH Location: sternal  -LH Primary Wound Type: Incision  -LH Additional Comments: mastisol, steri strips, mepilex  -LH      Row Name             Wound 08/29/23 0922 Right lower leg Incision    Wound - Properties Group Placement Date: 08/29/23  - Placement Time: 0922 -LH Present on Hospital Admission: N  -LH Side: Right  -LH Orientation: lower  -LH Location: leg  -LH Primary Wound Type: Incision  -LH Additional Comments: mastisol, steristrips, mepilex, ace  -LH    Retired Wound - Properties Group Placement Date: 08/29/23  - Placement Time: 0922 -LH Present on Hospital Admission: N  -LH Side: Right  -LH Orientation: lower  -LH Location: leg  -LH Primary Wound Type: Incision  -LH Additional Comments: mastisol, steristrips, mepilex, ace  -LH    Retired Wound - Properties Group Date first assessed: 08/29/23  - Time first assessed: 0922 -LH Present on Hospital Admission: N  -LH Side: Right  -LH Location: leg  -LH Primary Wound Type: Incision  -LH Additional Comments: mastisol, steristrips, mepilex, ace  -LH      Row Name 08/31/23 1358 08/31/23 0954       Positioning and Restraints    Pre-Treatment Position sitting in chair/recliner  -AB sitting in chair/recliner  -AB    Post Treatment Position bed  -AB bathroom  -AB    In Bed fowlers;call light within reach;with family/caregiver  -AB --    Bathroom -- sitting;with nsg  -AB               User Key  (r) = Recorded By, (t) = Taken By, (c) = Cosigned By      Initials Name Provider Type    AB Sylvia Gongora PTA Physical Therapist Assistant     Vilma Oneil RN Registered Nurse                    Physical Therapy Education       Title: PT OT SLP Therapies (In Progress)       Topic: Physical Therapy (In Progress)       Point: Mobility training (Done)       Learning Progress Summary             Patient Acceptance, E, VU,DU,NR by XOCHITL at 8/30/2023 0925    Comment: benefits of activity, progression of PT, sternal prec                         Point: Home exercise program (Not Started)       Learner Progress:  Not documented in this visit.              Point: Body mechanics (Not Started)       Learner Progress:  Not documented in this visit.              Point: Precautions (Done)       Learning Progress Summary             Patient Acceptance, E, VU,DU,NR by XOCHITL at 8/30/2023 0925    Comment: benefits of activity, progression of PT, sternal prec                                         User Key       Initials Effective Dates Name Provider Type Discipline     02/03/23 -  Kevin Maxwell, PT DPT Physical Therapist PT                  PT Recommendation and Plan             Time Calculation:    PT Charges       Row Name 08/31/23 1358 08/31/23 0954          Time Calculation    Start Time 1358  -AB 0954  -AB     Stop Time 1412  -AB 1018  -AB     Time Calculation (min) 14 min  -AB 24 min  -AB     PT Received On 08/31/23  -AB 08/31/23  -AB        Time Calculation- PT    Total Timed Code Minutes- PT 14 minute(s)  -AB 24 minute(s)  -AB               User Key  (r) = Recorded By, (t) = Taken By, (c) = Cosigned By      Initials Name Provider Type    Sylvia Webber PTA Physical Therapist Assistant                  Therapy Charges for Today       Code Description Service Date Service Provider Modifiers Qty    01077324338 HC GAIT TRAINING EA 15 MIN 8/30/2023 Sylvia Gongora PTA GP 1    62152960271 HC PT THER  PROC EA 15 MIN 8/30/2023 Sylvia Gongora, PTA GP 1    65866223074 HC GAIT TRAINING EA 15 MIN 8/31/2023 Sylvia Gongoar, PTA GP 1    20278865694 HC PT THER PROC EA 15 MIN 8/31/2023 Sylvia Gongora, PTA GP 1            PT G-Codes  Outcome Measure Options: AM-PAC 6 Clicks Basic Mobility (PT)  AM-PAC 6 Clicks Score (PT): 17    Sylvia Gongora, ULISES  8/31/2023

## 2023-09-01 ENCOUNTER — APPOINTMENT (OUTPATIENT)
Dept: GENERAL RADIOLOGY | Facility: HOSPITAL | Age: 78
DRG: 236 | End: 2023-09-01
Payer: MEDICARE

## 2023-09-01 LAB
ANION GAP SERPL CALCULATED.3IONS-SCNC: 9 MMOL/L (ref 5–15)
BASOPHILS # BLD AUTO: 0.02 10*3/MM3 (ref 0–0.2)
BASOPHILS NFR BLD AUTO: 0.2 % (ref 0–1.5)
BH BB BLOOD EXPIRATION DATE: NORMAL
BH BB BLOOD EXPIRATION DATE: NORMAL
BH BB BLOOD TYPE BARCODE: 5100
BH BB BLOOD TYPE BARCODE: 5100
BH BB DISPENSE STATUS: NORMAL
BH BB DISPENSE STATUS: NORMAL
BH BB PRODUCT CODE: NORMAL
BH BB PRODUCT CODE: NORMAL
BH BB UNIT NUMBER: NORMAL
BH BB UNIT NUMBER: NORMAL
BUN SERPL-MCNC: 22 MG/DL (ref 8–23)
BUN/CREAT SERPL: 25 (ref 7–25)
CALCIUM SPEC-SCNC: 8.5 MG/DL (ref 8.6–10.5)
CHLORIDE SERPL-SCNC: 100 MMOL/L (ref 98–107)
CO2 SERPL-SCNC: 27 MMOL/L (ref 22–29)
CREAT SERPL-MCNC: 0.88 MG/DL (ref 0.76–1.27)
CROSSMATCH INTERPRETATION: NORMAL
CROSSMATCH INTERPRETATION: NORMAL
DEPRECATED RDW RBC AUTO: 43.8 FL (ref 37–54)
EGFRCR SERPLBLD CKD-EPI 2021: 89.1 ML/MIN/1.73
EOSINOPHIL # BLD AUTO: 0.03 10*3/MM3 (ref 0–0.4)
EOSINOPHIL NFR BLD AUTO: 0.2 % (ref 0.3–6.2)
ERYTHROCYTE [DISTWIDTH] IN BLOOD BY AUTOMATED COUNT: 13.5 % (ref 12.3–15.4)
GLUCOSE BLDC GLUCOMTR-MCNC: 121 MG/DL (ref 70–130)
GLUCOSE BLDC GLUCOMTR-MCNC: 121 MG/DL (ref 70–130)
GLUCOSE BLDC GLUCOMTR-MCNC: 156 MG/DL (ref 70–130)
GLUCOSE BLDC GLUCOMTR-MCNC: 99 MG/DL (ref 70–130)
GLUCOSE SERPL-MCNC: 140 MG/DL (ref 65–99)
HCT VFR BLD AUTO: 35.1 % (ref 37.5–51)
HGB BLD-MCNC: 11.6 G/DL (ref 13–17.7)
IMM GRANULOCYTES # BLD AUTO: 0.08 10*3/MM3 (ref 0–0.05)
IMM GRANULOCYTES NFR BLD AUTO: 0.7 % (ref 0–0.5)
LYMPHOCYTES # BLD AUTO: 0.88 10*3/MM3 (ref 0.7–3.1)
LYMPHOCYTES NFR BLD AUTO: 7.3 % (ref 19.6–45.3)
MCH RBC QN AUTO: 29.3 PG (ref 26.6–33)
MCHC RBC AUTO-ENTMCNC: 33 G/DL (ref 31.5–35.7)
MCV RBC AUTO: 88.6 FL (ref 79–97)
MONOCYTES # BLD AUTO: 1.09 10*3/MM3 (ref 0.1–0.9)
MONOCYTES NFR BLD AUTO: 9 % (ref 5–12)
NEUTROPHILS NFR BLD AUTO: 10 10*3/MM3 (ref 1.7–7)
NEUTROPHILS NFR BLD AUTO: 82.6 % (ref 42.7–76)
NRBC BLD AUTO-RTO: 0 /100 WBC (ref 0–0.2)
PLATELET # BLD AUTO: 182 10*3/MM3 (ref 140–450)
PMV BLD AUTO: 11.8 FL (ref 6–12)
POTASSIUM SERPL-SCNC: 4.3 MMOL/L (ref 3.5–5.2)
QT INTERVAL: 350 MS
QTC INTERVAL: 406 MS
RBC # BLD AUTO: 3.96 10*6/MM3 (ref 4.14–5.8)
SODIUM SERPL-SCNC: 136 MMOL/L (ref 136–145)
UNIT  ABO: NORMAL
UNIT  ABO: NORMAL
UNIT  RH: NORMAL
UNIT  RH: NORMAL
WBC NRBC COR # BLD: 12.1 10*3/MM3 (ref 3.4–10.8)

## 2023-09-01 PROCEDURE — 25010000002 ENOXAPARIN PER 10 MG: Performed by: NURSE PRACTITIONER

## 2023-09-01 PROCEDURE — 97110 THERAPEUTIC EXERCISES: CPT

## 2023-09-01 PROCEDURE — 71046 X-RAY EXAM CHEST 2 VIEWS: CPT

## 2023-09-01 PROCEDURE — 63710000001 INSULIN LISPRO (HUMAN) PER 5 UNITS: Performed by: NURSE PRACTITIONER

## 2023-09-01 PROCEDURE — 25010000002 FUROSEMIDE PER 20 MG

## 2023-09-01 PROCEDURE — 82948 REAGENT STRIP/BLOOD GLUCOSE: CPT

## 2023-09-01 PROCEDURE — 85025 COMPLETE CBC W/AUTO DIFF WBC: CPT | Performed by: NURSE PRACTITIONER

## 2023-09-01 PROCEDURE — 25010000002 AMIODARONE IN DEXTROSE 5% 360-4.14 MG/200ML-% SOLUTION

## 2023-09-01 PROCEDURE — 97116 GAIT TRAINING THERAPY: CPT

## 2023-09-01 PROCEDURE — 80048 BASIC METABOLIC PNL TOTAL CA: CPT | Performed by: NURSE PRACTITIONER

## 2023-09-01 RX ADMIN — ATORVASTATIN CALCIUM 20 MG: 10 TABLET, FILM COATED ORAL at 20:50

## 2023-09-01 RX ADMIN — ENOXAPARIN SODIUM 40 MG: 100 INJECTION SUBCUTANEOUS at 09:07

## 2023-09-01 RX ADMIN — METOPROLOL TARTRATE 50 MG: 50 TABLET, FILM COATED ORAL at 09:07

## 2023-09-01 RX ADMIN — FUROSEMIDE 20 MG: 10 INJECTION, SOLUTION INTRAMUSCULAR; INTRAVENOUS at 09:07

## 2023-09-01 RX ADMIN — CLOPIDOGREL BISULFATE 75 MG: 75 TABLET, FILM COATED ORAL at 17:30

## 2023-09-01 RX ADMIN — OXYCODONE HYDROCHLORIDE 5 MG: 5 TABLET ORAL at 16:10

## 2023-09-01 RX ADMIN — POTASSIUM CHLORIDE 20 MEQ: 10 CAPSULE, COATED, EXTENDED RELEASE ORAL at 17:30

## 2023-09-01 RX ADMIN — POTASSIUM CHLORIDE 20 MEQ: 10 CAPSULE, COATED, EXTENDED RELEASE ORAL at 09:08

## 2023-09-01 RX ADMIN — ASPIRIN 81 MG: 81 TABLET, COATED ORAL at 09:08

## 2023-09-01 RX ADMIN — PANTOPRAZOLE SODIUM 40 MG: 40 TABLET, DELAYED RELEASE ORAL at 05:49

## 2023-09-01 RX ADMIN — FUROSEMIDE 20 MG: 10 INJECTION, SOLUTION INTRAMUSCULAR; INTRAVENOUS at 17:30

## 2023-09-01 RX ADMIN — AMIODARONE HYDROCHLORIDE 400 MG: 200 TABLET ORAL at 17:30

## 2023-09-01 RX ADMIN — LISINOPRIL 10 MG: 10 TABLET ORAL at 09:07

## 2023-09-01 RX ADMIN — AMIODARONE HYDROCHLORIDE 400 MG: 200 TABLET ORAL at 09:08

## 2023-09-01 RX ADMIN — POLYETHYLENE GLYCOL 3350 17 G: 17 POWDER, FOR SOLUTION ORAL at 09:08

## 2023-09-01 RX ADMIN — AMIODARONE HYDROCHLORIDE 0.5 MG/MIN: 1.8 INJECTION, SOLUTION INTRAVENOUS at 09:05

## 2023-09-01 RX ADMIN — METOPROLOL TARTRATE 50 MG: 50 TABLET, FILM COATED ORAL at 20:50

## 2023-09-01 RX ADMIN — OXYCODONE HYDROCHLORIDE 5 MG: 5 TABLET ORAL at 20:52

## 2023-09-01 RX ADMIN — INSULIN LISPRO 2 UNITS: 100 INJECTION, SOLUTION INTRAVENOUS; SUBCUTANEOUS at 09:07

## 2023-09-01 RX ADMIN — OXYCODONE HYDROCHLORIDE 5 MG: 5 TABLET ORAL at 05:53

## 2023-09-01 NOTE — PLAN OF CARE
Goal Outcome Evaluation:  Plan of Care Reviewed With: patient, spouse        Progress: improving  Outcome Evaluation: PT treatment complete, CGA for sit/stand and ambulated 350'. Will try stairs this afternoon once he is off of his IV. He was left in bathroom, wife states she can get him out.

## 2023-09-01 NOTE — THERAPY TREATMENT NOTE
Acute Care - Physical Therapy Treatment Note  [facility]     Patient Name: Nehemiah Gomez  : 1945  MRN: [id number]  Today's Date: 2023      Visit Dx:     ICD-10-CM ICD-9-CM   1. Impaired mobility [Z74.09 (ICD-10-CM)]  Z74.09 799.89   2. Coronary 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 heart with angina pectoris  I25.119 414.01     413.9     Patient Active Problem List   Diagnosis    Hypertension    GERD without esophagitis    Hyperlipidemia    Erectile dysfunction    Elevated prostate specific antigen (PSA)    BPH (benign prostatic hypertrophy) with urinary obstruction    Cough    Bronchitis    Primary insomnia    Benign prostatic hyperplasia with lower urinary tract symptoms    Prostate cancer    Scabies    Coronary 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 heart with angina pectoris    Abnormal stress test    Left main coronary artery disease    CAD (coronary artery disease), native coronary artery    Coronary 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 heart     Past Medical History:   Diagnosis Date    Coronary 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 heart with angina pectoris 2023    Diverticulosis     Erectile disorder due to 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     GERD (gastroesophageal reflux disease)     Heart murmur     Hyperlipidemia     Hypertension     Insomnia     Kidney stone     PONV (postoperative nausea and vomiting)     Prostate cancer      Past Surgical History:   Procedure Laterality Date    CARDIAC CATHETERIZATION N/A 2023    Procedure: Left Heart Cath;  Surgeon: Blayne Garcia MD;  Location: [facility] INVASIVE LOCATION;  Service: Cardiology;  Laterality: N/A;    CHOLECYSTECTOMY      COLONOSCOPY  2006    left sided diverticulosis    CORONARY ARTERY BYPASS GRAFT N/A 2023    Procedure: CORONARY ARTERY BYPASS GRAFTING X2 WITH INTERNAL MAMMARY ARTERY GRAFT, RIGHT LEG OPEN VEIN HARVEST,  TRANSESOPHAGEAL ECHOCARDIOGRAM;  Surgeon: Ernesto Cheema MD;  Location:  [facility] OR;  Service: Cardiothoracic;  Laterality: N/A;    ENDOSCOPY  03/20/2006    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;s distal esophagus    PROSTATE BIOPSY N/A 06/29/2020    Procedure: PROSTATE ULTRASOUND URONAV FUSION BIOPSY.;  Surgeon: Watson Sheehan MD;  Location:  [facility] OR;  Service: Urology;  Laterality: N/A;     PT Assessment (last 12 hours)       PT Evaluation and Treatment       Row Name 09/01/23 0805          Physical Therapy Time and Intention    Subjective Information no complaints  -AB     Document Type therapy note (daily note)  -AB     Mode of Treatment physical therapy  -AB       Row Name 09/01/23 0805          General Information    Existing Precautions/Restrictions fall;sternal  -AB       Row Name 09/01/23 0805          Bed Mobility    Comment, (Bed Mobility) up in chair  -AB       Row Name 09/01/23 0805          Sit-Stand Transfer    Sit-Stand [place] (Transfers) verbal cues;contact guard  -AB       Row Name 09/01/23 0805          Gait/Stairs (Locomotion)    [place] Level (Gait) contact guard  -AB     Distance in Feet (Gait) 350'  -AB     Deviations/Abnormal Patterns (Gait) gait speed decreased  -AB     Comment, (Gait/Stairs) try stairs this afternoon  -AB       Row Name 09/01/23 0805          Motor Skills    Comments, Therapeutic Exercise Cardiac 15 reps  -AB       Row Name             Wound 08/29/23 0922 sternal Incision    Wound - Properties Group Placement Date: 08/29/23  - Placement Time: 0922 -LH Present on Hospital Admission: N  -LH Location: sternal  -LH Primary Wound Type: Incision  -LH Additional Comments: mastisol, steri strips, mepilex  -LH    Retired Wound - Properties Group Placement Date: 08/29/23  - Placement Time: 0922 - Present on Hospital Admission: N  -LH Location: sternal  -LH Primary Wound Type: Incision  -LH Additional Comments: mastisol, steri strips, mepilex  -LH    Retired Wound - Properties Group  Date first assessed: 08/29/23 - Time first assessed: 0922 -LH Present on Hospital Admission: N  -LH Location: sternal  -LH Primary Wound Type: Incision  -LH Additional Comments: mastisol, steri strips, mepilex  -LH      Row Name             Wound 08/29/23 0922 Right lower leg Incision    Wound - Properties Group Placement Date: 08/29/23 - Placement Time: 0922 -LH Present on Hospital Admission: N  -LH Side: Right  -LH Orientation: lower  -LH Location: leg  -LH Primary Wound Type: Incision  -LH Additional Comments: mastisol, steristrips, mepilex, ace  -LH    Retired Wound - Properties Group Placement Date: 08/29/23 - Placement Time: 0922 -LH Present on Hospital Admission: N  -LH Side: Right  -LH Orientation: lower  -LH Location: leg  -LH Primary Wound Type: Incision  -LH Additional Comments: mastisol, steristrips, mepilex, ace  -LH    Retired Wound - Properties Group Date first assessed: 08/29/23 - Time first assessed: 0922 -LH Present on Hospital Admission: N  -LH Side: Right  -LH Location: leg  -LH Primary Wound Type: Incision  -LH Additional Comments: mastisol, steristrips, mepilex, ace  -LH      Row Name 09/01/23 0805          Plan of Care Review    Plan of Care Reviewed With patient;spouse  -AB     Progress improving  -AB     Outcome Evaluation PT treatment complete, CGA for sit/stand and ambulated 350'. Will try stairs this afternoon once he is off of his IV. He was left in bathroom, wife states she can get him out.  -AB       Row Name 09/01/23 0805          Positioning and Restraints    Pre-Treatment Position sitting in chair/recliner  -AB     Post Treatment Position bathroom  -AB     Bathroom sitting;call light within reach;with family/caregiver  -AB               User Key  (r) = Recorded By, (t) = Taken By, (c) = Cosigned By      Initials Name Provider Type    AB Sylvia Gongora, PTA Physical Therapist Assistant     Vilma Oneil, RN Registered Nurse                    Physical Therapy  Education       Title: PT OT SLP Therapies (In Progress)       Topic: Physical Therapy (In Progress)       Point: Mobility training (Done)       Learning Progress Summary             Patient Acceptance, E, VU,DU,NR by XOCHITL at 8/30/2023 0925    Comment: benefits of activity, progression of PT, sternal prec                         Point: Home exercise program (Not Started)       Learner Progress:  Not documented in this visit.              Point: Body mechanics (Not Started)       Learner Progress:  Not documented in this visit.              Point: Precautions (Done)       Learning Progress Summary             Patient Acceptance, E, VU,DU,NR by XOCHITL at 8/30/2023 0925    Comment: benefits of activity, progression of PT, sternal prec                                         User Key       Initials Effective Dates Name Provider Type Discipline    XOCHITL 02/03/23 -  Kevin Maxwell, PT DPT Physical Therapist PT                  PT Recommendation and Plan     Plan of Care Reviewed With: patient, spouse  Progress: improving  Outcome Evaluation: PT treatment complete, CGA for sit/stand and ambulated 350'. Will try stairs this afternoon once he is off of his IV. He was left in bathroom, wife states she can get him out.       Time Calculation:    PT Charges       Row Name 09/01/23 0805             Time Calculation    Start Time 0805  -AB      Stop Time 0828  -AB      Time Calculation (min) 23 min  -AB      PT Received On 09/01/23  -AB         Time Calculation- PT    Total Timed Code Minutes- PT 23 minute(s)  -AB                User Key  (r) = Recorded By, (t) = Taken By, (c) = Cosigned By      Initials Name Provider Type    AB Sylvia Gongora PTA Physical Therapist Assistant                  Therapy Charges for Today       Code Description Service Date Service Provider Modifiers Qty    15887235986 HC GAIT TRAINING EA 15 MIN 8/31/2023 Sylvia Gongora PTA GP 1    90927979683 HC PT THER PROC EA 15 MIN 8/31/2023 Sylvia Gongora PTA  GP 1    91236460690 HC GAIT TRAINING EA 15 MIN 8/31/2023 Sylvia Gongora, PTA GP 1    17077155621 HC GAIT TRAINING EA 15 MIN 9/1/2023 Sylvia Gongora, PTA GP 1    24263588851 HC PT THER PROC EA 15 MIN 9/1/2023 Sylvia Gongora, PTA GP 1            PT G-Codes  Outcome Measure Options: AM-PAC 6 Clicks Basic Mobility (PT)  AM-PAC 6 Clicks Score (PT): 17    Sylvia Gongora, ULISES  9/1/2023

## 2023-09-01 NOTE — NURSING NOTE
Patient VSS/RA. Sinus 68-96 per tele. Walked x4 this shift. Sternal incision to be cleaned with chloro prep bid as ordered. Had few bowel movements today. Good urine output. Continue monitoring

## 2023-09-01 NOTE — PROGRESS NOTES
"Patient name: Nehemiah Gomez  Patient : 1945  VISIT # [id number]  MR #[id number]    Procedure:Procedure(s):  CORONARY ARTERY BYPASS GRAFTING X2 WITH INTERNAL MAMMARY ARTERY GRAFT, RIGHT LEG OPEN VEIN HARVEST, TRANSESOPHAGEAL ECHOCARDIOGRAM  Procedure Date:2023  POD:3 Days Post-Op    Subjective   Sitting up in the recliner on room air. Pain is well control. Weight is down 2 lbs over the last 24 hours and up 4 lbs from his baseline weight. Reaching 1500 ml on incentive spirometer. BM (+). Walked 350 ft with PT this am. Patient converted to a normal sinus rhythm.       Telemetry: Sinus 70s-80s     Objective     Visit Vitals  /72 (BP Location: Left arm, Patient Position: Sitting)   Pulse 82   Temp 97.6 °F (36.4 °C) (Oral)   Resp 16   Ht 172 cm (67.72\")   Wt 87.8 kg (193 lb 9.6 oz)   SpO2 97%   BMI 29.68 kg/m²       Intake/Output Summary (Last 24 hours) at 2023 1039  Last data filed at 2023 0524  Gross per 24 hour   Intake 900 ml   Output 1478 ml   Net -578 ml       Lab:     CBC:  Results from last 7 days   Lab Units 23  0505 23  0333 23  1317   WBC 10*3/mm3 12.10* 15.77* 17.76*   HEMATOCRIT % 35.1* 38.3 34.0*   PLATELETS 10*3/mm3 182 166 167          BMP:  Results from last 7 days   Lab Units 23  0505 23  1616 23  0333   SODIUM mmol/L 136 136 140   POTASSIUM mmol/L 4.3 4.2 4.8   CHLORIDE mmol/L 100 102 106   CO2 mmol/L 27.0 26.0 25.0   GLUCOSE mg/dL 140* 127* 130*   BUN mg/dL 22 20 18   CREATININE mg/dL 0.88 0.81 0.98          COAG:  Results from last 7 days   Lab Units 23  0326 23  1210   INR  1.25* 1.25*   APTT seconds  --  37.4*       IMAGES:       Imaging Results (Last 24 Hours)       Procedure Component Value Units Date/Time    XR Chest PA & Lateral [[id number]] Collected: 23     Updated: 23    Narrative:      EXAMINATION: XR CHEST PA AND LATERAL-     2023 6:00 AM CDT     HISTORY: post op CABG     A frontal and " lateral views of the chest are obtained. Comparison is  made with the previous study dated 08/31/2023.     The lungs are poorly expanded.     The left-sided chest tube and mediastinal drain have been removed since  the previous study.     There are atelectatic changes and a trace left basal pleural effusion.     There is no pulmonary congestion or pneumothorax.     The heart size in the normal range. The atheromatous change of thoracic  aorta are noted. There is evidence of prior cardiac surgery.     There is no acute bony abnormality.       Impression:      1. Poor lung expansion. Atelectatic changes in the lower lungs. A trace  left basal pleural effusion.  2. The tubes and drains have been removed since the previous study. No  pneumothorax.  This report was finalized on 09/01/2023 06:43 by Dr. Brody Mane MD.        My interpretation of chest x-ray: Support lines in appropriate position. Vascular crowding. No visible pneumothorax.      Physical Exam:  General: Alert, oriented. No apparent distress.   Cardiovascular: Regular rate and rhythm without murmur, rubs, or gallops.    Pulmonary: Clear to auscultation bilaterally without wheezing, rubs, or rales.  Chest: Sternotomy incision clean and intact. Scant whitish yellow thick drainage noted at the mid sternal region.  Sternum stable. No clicks. Mediastinal chest tube x 2, pleural chest tube x 1.  Fluid serosanguineous   Abdomen: Soft, non distended, and non tender.  Extremities: Warm, moves all extremities. No edema. Saphenectomy site clean, dry, and intact.   Neurologic:  Grossly intact with no focal deficits.         Impression:  Coronary 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 heart with unstable angina  Hypertension  Hyperlipidemia  Former Smoker  Overweight, BMI 29.1  GERD  Hx of Prostate Cancer    Plan:  Continue Amiodarone gtt for a total of 24 hours  Continue to monitor sternal wound drainage   Continue diuresis and potassium  replacement  Wean oxygen as tolerated  Routine cardiac surgery care   Pulmonary toilet and ambulation   Anticipate home over the weekend  Discussed with patient and nursing        Heike Strange, APRN  09/01/23  10:39 CDT

## 2023-09-01 NOTE — THERAPY TREATMENT NOTE
Acute Care - Physical Therapy Treatment Note  [facility]     Patient Name: Nehemiah Gomez  : 1945  MRN: [id number]  Today's Date: 2023      Visit Dx:     ICD-10-CM ICD-9-CM   1. Impaired mobility [Z74.09 (ICD-10-CM)]  Z74.09 799.89   2. Coronary 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 heart with angina pectoris  I25.119 414.01     413.9     Patient Active Problem List   Diagnosis    Hypertension    GERD without esophagitis    Hyperlipidemia    Erectile dysfunction    Elevated prostate specific antigen (PSA)    BPH (benign prostatic hypertrophy) with urinary obstruction    Cough    Bronchitis    Primary insomnia    Benign prostatic hyperplasia with lower urinary tract symptoms    Prostate cancer    Scabies    Coronary 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 heart with angina pectoris    Abnormal stress test    Left main coronary artery disease    CAD (coronary artery disease), native coronary artery    Coronary 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 heart     Past Medical History:   Diagnosis Date    Coronary 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 heart with angina pectoris 2023    Diverticulosis     Erectile disorder due to 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     GERD (gastroesophageal reflux disease)     Heart murmur     Hyperlipidemia     Hypertension     Insomnia     Kidney stone     PONV (postoperative nausea and vomiting)     Prostate cancer      Past Surgical History:   Procedure Laterality Date    CARDIAC CATHETERIZATION N/A 2023    Procedure: Left Heart Cath;  Surgeon: Blayne Garcia MD;  Location: [facility] INVASIVE LOCATION;  Service: Cardiology;  Laterality: N/A;    CHOLECYSTECTOMY      COLONOSCOPY  2006    left sided diverticulosis    CORONARY ARTERY BYPASS GRAFT N/A 2023    Procedure: CORONARY ARTERY BYPASS GRAFTING X2 WITH INTERNAL MAMMARY ARTERY GRAFT, RIGHT LEG OPEN VEIN HARVEST,  TRANSESOPHAGEAL ECHOCARDIOGRAM;  Surgeon: Ernesto Cheema MD;  Location:  [facility] OR;  Service: Cardiothoracic;  Laterality: N/A;    ENDOSCOPY  03/20/2006    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;s distal esophagus    PROSTATE BIOPSY N/A 06/29/2020    Procedure: PROSTATE ULTRASOUND URONAV FUSION BIOPSY.;  Surgeon: Watson Sheehan MD;  Location:  [facility] OR;  Service: Urology;  Laterality: N/A;     PT Assessment (last 12 hours)       PT Evaluation and Treatment       Row Name 09/01/23 1310 09/01/23 0805       Physical Therapy Time and Intention    Subjective Information no complaints  -AB no complaints  -AB    Document Type therapy note (daily note)  -AB therapy note (daily note)  -AB    Mode of Treatment physical therapy  -AB physical therapy  -AB      Row Name 09/01/23 1310 09/01/23 0805       General Information    Existing Precautions/Restrictions fall;sternal  -AB fall;sternal  -AB      Row Name 09/01/23 1310 09/01/23 0805       Bed Mobility    Comment, (Bed Mobility) up in chair  -AB up in chair  -AB      Row Name 09/01/23 1310 09/01/23 0805       Sit-Stand Transfer    Sit-Stand [place] (Transfers) verbal cues;contact guard  -AB verbal cues;contact guard  -AB      Row Name 09/01/23 1310 09/01/23 0805       Gait/Stairs (Locomotion)    [place] Level (Gait) contact guard  -AB contact guard  -AB    Distance in Feet (Gait) 350'  -'  -AB    Deviations/Abnormal Patterns (Gait) gait speed decreased  -AB gait speed decreased  -AB    Comment, (Gait/Stairs) -- try stairs this afternoon  -AB      Row Name 09/01/23 0805          Motor Skills    Comments, Therapeutic Exercise Cardiac 15 reps  -AB       Row Name             Wound 08/29/23 0922 sternal Incision    Wound - Properties Group Placement Date: 08/29/23  - Placement Time: 0922  -LH Present on Hospital Admission: N  -LH Location: sternal  -LH Primary Wound Type: Incision  -LH Additional Comments: mastisol, steri strips, mepilex  -LH    Retired Wound - Properties Group  Placement Date: 08/29/23 - Placement Time: 0922 -LH Present on Hospital Admission: N  -LH Location: sternal  -LH Primary Wound Type: Incision  -LH Additional Comments: mastisol, steri strips, mepilex  -LH    Retired Wound - Properties Group Date first assessed: 08/29/23 - Time first assessed: 0922 -LH Present on Hospital Admission: N  -LH Location: sternal  -LH Primary Wound Type: Incision  -LH Additional Comments: mastisol, steri strips, mepilex  -LH      Row Name             Wound 08/29/23 0922 Right lower leg Incision    Wound - Properties Group Placement Date: 08/29/23 - Placement Time: 0922 -LH Present on Hospital Admission: N  -LH Side: Right  -LH Orientation: lower  -LH Location: leg  -LH Primary Wound Type: Incision  -LH Additional Comments: mastisol, steristrips, mepilex, ace  -LH    Retired Wound - Properties Group Placement Date: 08/29/23 - Placement Time: 0922 -LH Present on Hospital Admission: N  -LH Side: Right  -LH Orientation: lower  -LH Location: leg  -LH Primary Wound Type: Incision  -LH Additional Comments: mastisol, steristrips, mepilex, ace  -LH    Retired Wound - Properties Group Date first assessed: 08/29/23 - Time first assessed: 0922 -LH Present on Hospital Admission: N  -LH Side: Right  -LH Location: leg  -LH Primary Wound Type: Incision  -LH Additional Comments: mastisol, steristrips, mepilex, ace  -LH      Row Name 09/01/23 0805          Plan of Care Review    Plan of Care Reviewed With patient;spouse  -AB     Progress improving  -AB     Outcome Evaluation PT treatment complete, CGA for sit/stand and ambulated 350'. Will try stairs this afternoon once he is off of his IV. He was left in bathroom, wife states she can get him out.  -AB       Row Name 09/01/23 1310 09/01/23 0805       Positioning and Restraints    Pre-Treatment Position sitting in chair/recliner  -AB sitting in chair/recliner  -AB    Post Treatment Position chair  -AB bathroom  -AB    In Chair  reclined;sitting;call light within reach  -AB --    Bathroom -- sitting;call light within reach;with family/caregiver  -AB              User Key  (r) = Recorded By, (t) = Taken By, (c) = Cosigned By      Initials Name Provider Type    AB Sylvia Gongora, PTA Physical Therapist Assistant    Vilma Haider, RN Registered Nurse                    Physical Therapy Education       Title: PT OT SLP Therapies (In Progress)       Topic: Physical Therapy (In Progress)       Point: Mobility training (Done)       Learning Progress Summary             Patient Acceptance, E, VU,DU,NR by XOCHITL at 8/30/2023 0925    Comment: benefits of activity, progression of PT, sternal prec                         Point: Home exercise program (Not Started)       Learner Progress:  Not documented in this visit.              Point: Body mechanics (Not Started)       Learner Progress:  Not documented in this visit.              Point: Precautions (Done)       Learning Progress Summary             Patient Acceptance, E, VU,DU,NR by XOCHITL at 8/30/2023 0925    Comment: benefits of activity, progression of PT, sternal prec                                         User Key       Initials Effective Dates Name Provider Type Discipline    XOCHITL 02/03/23 -  Kevin Maxwell, PT DPT Physical Therapist PT                  PT Recommendation and Plan     Plan of Care Reviewed With: patient, spouse  Progress: improving  Outcome Evaluation: PT treatment complete, CGA for sit/stand and ambulated 350'. Will try stairs this afternoon once he is off of his IV. He was left in bathroom, wife states she can get him out.       Time Calculation:    PT Charges       Row Name 09/01/23 1310 09/01/23 0805          Time Calculation    Start Time 1310  -AB 0805  -AB     Stop Time 1328  -AB 0828  -AB     Time Calculation (min) 18 min  -AB 23 min  -AB     PT Received On 09/01/23  -AB 09/01/23  -AB        Time Calculation- PT    Total Timed Code Minutes- PT 18 minute(s)  -AB 23  minute(s)  -AB               User Key  (r) = Recorded By, (t) = Taken By, (c) = Cosigned By      Initials Name Provider Type    Sylvia Webber, PTA Physical Therapist Assistant                  Therapy Charges for Today       Code Description Service Date Service Provider Modifiers Qty    77500409122 HC GAIT TRAINING EA 15 MIN 8/31/2023 Sylvia Gongora, PTA GP 1    01224544645 HC PT THER PROC EA 15 MIN 8/31/2023 Sylvia Gongora, PTA GP 1    46121910517 HC GAIT TRAINING EA 15 MIN 8/31/2023 Sylvia Gongora, PTA GP 1    08403072693 HC GAIT TRAINING EA 15 MIN 9/1/2023 Sylvia Gongora, PTA GP 1    08227899109 HC PT THER PROC EA 15 MIN 9/1/2023 Sylvia Gongora, PTA GP 1            PT G-Codes  Outcome Measure Options: AM-PAC 6 Clicks Basic Mobility (PT)  AM-PAC 6 Clicks Score (PT): 16    Sylvia Gongora PTA  9/1/2023

## 2023-09-02 ENCOUNTER — APPOINTMENT (OUTPATIENT)
Dept: GENERAL RADIOLOGY | Facility: HOSPITAL | Age: 78
DRG: 236 | End: 2023-09-02
Payer: MEDICARE

## 2023-09-02 LAB
ANION GAP SERPL CALCULATED.3IONS-SCNC: 11 MMOL/L (ref 5–15)
BASOPHILS # BLD AUTO: 0.04 10*3/MM3 (ref 0–0.2)
BASOPHILS NFR BLD AUTO: 0.4 % (ref 0–1.5)
BUN SERPL-MCNC: 19 MG/DL (ref 8–23)
BUN/CREAT SERPL: 21.3 (ref 7–25)
CALCIUM SPEC-SCNC: 9 MG/DL (ref 8.6–10.5)
CHLORIDE SERPL-SCNC: 98 MMOL/L (ref 98–107)
CO2 SERPL-SCNC: 26 MMOL/L (ref 22–29)
CREAT SERPL-MCNC: 0.89 MG/DL (ref 0.76–1.27)
DEPRECATED RDW RBC AUTO: 43.8 FL (ref 37–54)
EGFRCR SERPLBLD CKD-EPI 2021: 88.8 ML/MIN/1.73
EOSINOPHIL # BLD AUTO: 0.15 10*3/MM3 (ref 0–0.4)
EOSINOPHIL NFR BLD AUTO: 1.6 % (ref 0.3–6.2)
ERYTHROCYTE [DISTWIDTH] IN BLOOD BY AUTOMATED COUNT: 13.3 % (ref 12.3–15.4)
GLUCOSE BLDC GLUCOMTR-MCNC: 113 MG/DL (ref 70–130)
GLUCOSE BLDC GLUCOMTR-MCNC: 116 MG/DL (ref 70–130)
GLUCOSE BLDC GLUCOMTR-MCNC: 137 MG/DL (ref 70–130)
GLUCOSE BLDC GLUCOMTR-MCNC: 157 MG/DL (ref 70–130)
GLUCOSE SERPL-MCNC: 126 MG/DL (ref 65–99)
HCT VFR BLD AUTO: 36.9 % (ref 37.5–51)
HGB BLD-MCNC: 12 G/DL (ref 13–17.7)
IMM GRANULOCYTES # BLD AUTO: 0.04 10*3/MM3 (ref 0–0.05)
IMM GRANULOCYTES NFR BLD AUTO: 0.4 % (ref 0–0.5)
LYMPHOCYTES # BLD AUTO: 1.53 10*3/MM3 (ref 0.7–3.1)
LYMPHOCYTES NFR BLD AUTO: 16.2 % (ref 19.6–45.3)
MCH RBC QN AUTO: 29.1 PG (ref 26.6–33)
MCHC RBC AUTO-ENTMCNC: 32.5 G/DL (ref 31.5–35.7)
MCV RBC AUTO: 89.6 FL (ref 79–97)
MONOCYTES # BLD AUTO: 1.14 10*3/MM3 (ref 0.1–0.9)
MONOCYTES NFR BLD AUTO: 12.1 % (ref 5–12)
NEUTROPHILS NFR BLD AUTO: 6.54 10*3/MM3 (ref 1.7–7)
NEUTROPHILS NFR BLD AUTO: 69.3 % (ref 42.7–76)
NRBC BLD AUTO-RTO: 0 /100 WBC (ref 0–0.2)
PLATELET # BLD AUTO: 223 10*3/MM3 (ref 140–450)
PMV BLD AUTO: 11.2 FL (ref 6–12)
POTASSIUM SERPL-SCNC: 3.7 MMOL/L (ref 3.5–5.2)
RBC # BLD AUTO: 4.12 10*6/MM3 (ref 4.14–5.8)
SODIUM SERPL-SCNC: 135 MMOL/L (ref 136–145)
WBC NRBC COR # BLD: 9.44 10*3/MM3 (ref 3.4–10.8)

## 2023-09-02 PROCEDURE — 82948 REAGENT STRIP/BLOOD GLUCOSE: CPT

## 2023-09-02 PROCEDURE — 63710000001 INSULIN LISPRO (HUMAN) PER 5 UNITS: Performed by: NURSE PRACTITIONER

## 2023-09-02 PROCEDURE — 25010000002 ENOXAPARIN PER 10 MG: Performed by: NURSE PRACTITIONER

## 2023-09-02 PROCEDURE — 97116 GAIT TRAINING THERAPY: CPT

## 2023-09-02 PROCEDURE — 85025 COMPLETE CBC W/AUTO DIFF WBC: CPT | Performed by: NURSE PRACTITIONER

## 2023-09-02 PROCEDURE — 80048 BASIC METABOLIC PNL TOTAL CA: CPT | Performed by: NURSE PRACTITIONER

## 2023-09-02 PROCEDURE — 71045 X-RAY EXAM CHEST 1 VIEW: CPT

## 2023-09-02 PROCEDURE — 25010000002 FUROSEMIDE PER 20 MG

## 2023-09-02 RX ADMIN — FUROSEMIDE 20 MG: 10 INJECTION, SOLUTION INTRAMUSCULAR; INTRAVENOUS at 17:04

## 2023-09-02 RX ADMIN — ATORVASTATIN CALCIUM 20 MG: 10 TABLET, FILM COATED ORAL at 21:04

## 2023-09-02 RX ADMIN — LISINOPRIL 10 MG: 10 TABLET ORAL at 08:43

## 2023-09-02 RX ADMIN — OXYCODONE HYDROCHLORIDE 5 MG: 5 TABLET ORAL at 05:45

## 2023-09-02 RX ADMIN — ENOXAPARIN SODIUM 40 MG: 100 INJECTION SUBCUTANEOUS at 08:43

## 2023-09-02 RX ADMIN — METOPROLOL TARTRATE 50 MG: 50 TABLET, FILM COATED ORAL at 21:04

## 2023-09-02 RX ADMIN — ASPIRIN 81 MG: 81 TABLET, COATED ORAL at 08:44

## 2023-09-02 RX ADMIN — POTASSIUM CHLORIDE 20 MEQ: 10 CAPSULE, COATED, EXTENDED RELEASE ORAL at 17:05

## 2023-09-02 RX ADMIN — CLOPIDOGREL BISULFATE 75 MG: 75 TABLET, FILM COATED ORAL at 17:05

## 2023-09-02 RX ADMIN — AMIODARONE HYDROCHLORIDE 400 MG: 200 TABLET ORAL at 17:05

## 2023-09-02 RX ADMIN — AMIODARONE HYDROCHLORIDE 400 MG: 200 TABLET ORAL at 08:43

## 2023-09-02 RX ADMIN — POTASSIUM CHLORIDE 20 MEQ: 10 CAPSULE, COATED, EXTENDED RELEASE ORAL at 08:43

## 2023-09-02 RX ADMIN — OXYCODONE HYDROCHLORIDE 5 MG: 5 TABLET ORAL at 14:52

## 2023-09-02 RX ADMIN — INSULIN LISPRO 2 UNITS: 100 INJECTION, SOLUTION INTRAVENOUS; SUBCUTANEOUS at 12:22

## 2023-09-02 RX ADMIN — METOPROLOL TARTRATE 50 MG: 50 TABLET, FILM COATED ORAL at 08:43

## 2023-09-02 RX ADMIN — FUROSEMIDE 20 MG: 10 INJECTION, SOLUTION INTRAMUSCULAR; INTRAVENOUS at 08:43

## 2023-09-02 RX ADMIN — POLYETHYLENE GLYCOL 3350 17 G: 17 POWDER, FOR SOLUTION ORAL at 08:44

## 2023-09-02 RX ADMIN — PANTOPRAZOLE SODIUM 40 MG: 40 TABLET, DELAYED RELEASE ORAL at 05:45

## 2023-09-02 NOTE — PLAN OF CARE
Goal Outcome Evaluation:  Plan of Care Reviewed With: patient        Progress: improving         Outcome Evaluation: VSS, O2 sats WNL on RA, getting 1500 on IS, midline incision cleansed with cloraprep, c/d/i no drainage noted, FARZANA, ambulated in marie this morning.  Sinus 69-84.

## 2023-09-02 NOTE — PROGRESS NOTES
"Patient name: Nehemiah Gomez  Patient : 1945  VISIT # [id number]  MR #[id number]    Procedure:Procedure(s):  CORONARY ARTERY BYPASS GRAFTING X2 WITH INTERNAL MAMMARY ARTERY GRAFT, RIGHT LEG OPEN VEIN HARVEST, TRANSESOPHAGEAL ECHOCARDIOGRAM  Procedure Date:2023  POD:4 Days Post-Op    Subjective   Chest pain      Afebrile.  On room air.  Ambulating well.  Pulling 1500 on incentive.  He has had a bowel movement.  Weight down 2 pounds overnight.  Within 3 pounds of baseline.  Creatinine 0.8, potassium 3.7.  No more drainage from sternal wound.  They have been washing it twice daily with soap and water    Telemetry: Sinus, 69-84       Objective     Visit Vitals  /75 (BP Location: Left arm, Patient Position: Sitting)   Pulse 83   Temp 97.9 °F (36.6 °C) (Oral)   Resp 16   Ht 172 cm (67.72\")   Wt 87 kg (191 lb 12.8 oz)   SpO2 94%   BMI 29.41 kg/m²       Intake/Output Summary (Last 24 hours) at 2023 0754  Last data filed at 2023 0500  Gross per 24 hour   Intake 1140 ml   Output 2650 ml   Net -1510 ml     LABS:    CBC  WBC   Date/Time Value Ref Range Status   2023 9.44 3.40 - 10.80 10*3/mm3 Final     RBC   Date/Time Value Ref Range Status   2023 4.12 (L) 4.14 - 5.80 10*6/mm3 Final     Hemoglobin   Date/Time Value Ref Range Status   2023 12.0 (L) 13.0 - 17.7 g/dL Final     Hematocrit   Date/Time Value Ref Range Status   2023 36.9 (L) 37.5 - 51.0 % Final     MCH   Date/Time Value Ref Range Status   2023 29.1 26.6 - 33.0 pg Final     MCHC   Date/Time Value Ref Range Status   2023 32.5 31.5 - 35.7 g/dL Final     RDW   Date/Time Value Ref Range Status   2023 13.3 12.3 - 15.4 % Final     RDW-SD   Date/Time Value Ref Range Status   2023 033 43.8 37.0 - 54.0 fl Final     MPV   Date/Time Value Ref Range Status   2023 11.2 6.0 - 12.0 fL Final     Platelets   Date/Time Value Ref Range Status   2023 0338 223 " 140 - 450 10*3/mm3 Final       BMP  Glucose   Date/Time Value Ref Range Status   09/02/2023 0338 126 (H) 65 - 99 mg/dL Final     BUN   Date/Time Value Ref Range Status   09/02/2023 0338 19 8 - 23 mg/dL Final     Creatinine   Date/Time Value Ref Range Status   09/02/2023 0338 0.89 0.76 - 1.27 mg/dL Final     Sodium   Date/Time Value Ref Range Status   09/02/2023 0338 135 (L) 136 - 145 mmol/L Final     Potassium   Date/Time Value Ref Range Status   09/02/2023 0338 3.7 3.5 - 5.2 mmol/L Final     Chloride   Date/Time Value Ref Range Status   09/02/2023 0338 98 98 - 107 mmol/L Final     CO2   Date/Time Value Ref Range Status   09/02/2023 0338 26.0 22.0 - 29.0 mmol/L Final     Calcium   Date/Time Value Ref Range Status   09/02/2023 0338 9.0 8.6 - 10.5 mg/dL Final     BUN/Creatinine Ratio   Date/Time Value Ref Range Status   09/02/2023 0338 21.3 7.0 - 25.0 Final     Anion Gap   Date/Time Value Ref Range Status   09/02/2023 0338 11.0 5.0 - 15.0 mmol/L Final     eGFR   Date/Time Value Ref Range Status   09/02/2023 0338 88.8 >60.0 mL/min/1.73 Final   IMAGES:       Imaging Results (Last 24 Hours)       Procedure Component Value Units Date/Time    XR Chest 1 View [[id number]] Resulted: 09/02/23 0449     Updated: 09/02/23 0450          My image interpretation: Stable left lower lobe atelectasis/effusion    Physical Exam:  General: Alert, oriented. No apparent distress.   Cardiovascular: Regular rate and rhythm without murmur, rubs, or gallops.    Pulmonary: Clear to auscultation bilaterally without wheezing, rubs, or rales.  Chest: Sternotomy incision clean and intact. Sternum stable. No clicks.    Abdomen: Soft, non distended, and non tender.  Extremities: Warm, moves all extremities. No edema. Saphenectomy site clean, dry, and intact.   Neurologic:  Grossly intact with no focal deficits.       Impression:  Coronary 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 heart with unstable  angina  Hypertension  Hyperlipidemia  Former Smoker  Overweight, BMI 29.1  GERD  Hx of Prostate Cancer      Plan:  Continue to monitor sternal wound drainage   Routine cardiac surgery care   Pulmonary toilet and ambulation   Anticipate home tomorrow.  Would like to reassess sternal wound again in the morning  Discussed with patient and nursing      Bree Sandoval, APRN  09/02/23  07:54 CDT

## 2023-09-02 NOTE — DISCHARGE INSTRUCTIONS
Someone from the the [facility] will be contacting you after discharge to check on your recovery.

## 2023-09-02 NOTE — PLAN OF CARE
Goal Outcome Evaluation:  Plan of Care Reviewed With: patient        Progress: improving  Outcome Evaluation: PT tx completed. Requires min x1 for bed mobility while maintaining sternal precautions. SBA for sit to stands. Ascends/descends 4 stairs with CGA. Amb 500' with SBA with no SOA noted. Will cont to follow.

## 2023-09-02 NOTE — THERAPY TREATMENT NOTE
Acute Care - Physical Therapy Treatment Note  [facility]     Patient Name: Nehemiah Gomez  : 1945  MRN: [id number]  Today's Date: 2023      Visit Dx:     ICD-10-CM ICD-9-CM   1. Impaired mobility [Z74.09 (ICD-10-CM)]  Z74.09 799.89   2. Coronary 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 heart with angina pectoris  I25.119 414.01     413.9     Patient Active Problem List   Diagnosis    Hypertension    GERD without esophagitis    Hyperlipidemia    Erectile dysfunction    Elevated prostate specific antigen (PSA)    BPH (benign prostatic hypertrophy) with urinary obstruction    Cough    Bronchitis    Primary insomnia    Benign prostatic hyperplasia with lower urinary tract symptoms    Prostate cancer    Scabies    Coronary 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 heart with angina pectoris    Abnormal stress test    Left main coronary artery disease    CAD (coronary artery disease), native coronary artery    Coronary 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 heart     Past Medical History:   Diagnosis Date    Coronary 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 heart with angina pectoris 2023    Diverticulosis     Erectile disorder due to 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     GERD (gastroesophageal reflux disease)     Heart murmur     Hyperlipidemia     Hypertension     Insomnia     Kidney stone     PONV (postoperative nausea and vomiting)     Prostate cancer      Past Surgical History:   Procedure Laterality Date    CARDIAC CATHETERIZATION N/A 2023    Procedure: Left Heart Cath;  Surgeon: Blayne Garcia MD;  Location: [facility] INVASIVE LOCATION;  Service: Cardiology;  Laterality: N/A;    CHOLECYSTECTOMY      COLONOSCOPY  2006    left sided diverticulosis    CORONARY ARTERY BYPASS GRAFT N/A 2023    Procedure: CORONARY ARTERY BYPASS GRAFTING X2 WITH INTERNAL MAMMARY ARTERY GRAFT, RIGHT LEG OPEN VEIN HARVEST,  TRANSESOPHAGEAL ECHOCARDIOGRAM;  Surgeon: Ernesto Cheema MD;  Location:  [facility] OR;  Service: Cardiothoracic;  Laterality: N/A;    ENDOSCOPY  03/20/2006    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;s distal esophagus    PROSTATE BIOPSY N/A 06/29/2020    Procedure: PROSTATE ULTRASOUND URONAV FUSION BIOPSY.;  Surgeon: Watson Sheehan MD;  Location:  [facility] OR;  Service: Urology;  Laterality: N/A;     PT Assessment (last 12 hours)       PT Evaluation and Treatment       Row Name 09/02/23 0918          Physical Therapy Time and Intention    Subjective Information complains of;fatigue  -LY     Document Type therapy note (daily note)  -LY     Mode of Treatment physical therapy  -LY       Row Name 09/02/23 0918          General Information    Existing Precautions/Restrictions fall;sternal  -LY       Row Name 09/02/23 0918          Pain    Pretreatment Pain Rating 0/10 - no pain  -LY     Posttreatment Pain Rating 0/10 - no pain  -LY       Row Name 09/02/23 0918          Bed Mobility    Sit-Supine [place] (Bed Mobility) verbal cues;minimum assist (75% patient effort)  -LY     Supine-Sit-Supine [place] (Bed Mobility) verbal cues;minimum assist (75% patient effort)  -LY     Assistive Device (Bed Mobility) head of bed elevated  -LY       Row Name 09/02/23 0918          Sit-Stand Transfer    Sit-Stand [place] (Transfers) standby assist  -LY       Row Name 09/02/23 0918          Gait/Stairs (Locomotion)    [place] Level (Gait) standby assist  -LY     Distance in Feet (Gait) 500'  -LY     Pattern (Gait) step-through  -LY     Deviations/Abnormal Patterns (Gait) gait speed decreased  -LY     [place] Level (Stairs) verbal cues;contact guard  -LY     Handrail Location (Stairs) none  -LY     Number of Steps (Stairs) 4  -LY     Ascending Technique (Stairs) step-to-step  -LY     Descending Technique (Stairs) step-to-step  -LY       Row Name 09/02/23 0918          Motor Skills    Comments, Therapeutic Exercise cardiac protocol  -LY        Row Name             Wound 08/29/23 0922 sternal Incision    Wound - Properties Group Placement Date: 08/29/23 -LH Placement Time: 0922 -LH Present on Hospital Admission: N  -LH Location: sternal  -LH Primary Wound Type: Incision  -LH Additional Comments: mastisol, steri strips, mepilex  -LH    Retired Wound - Properties Group Placement Date: 08/29/23 -LH Placement Time: 0922 -LH Present on Hospital Admission: N  -LH Location: sternal  -LH Primary Wound Type: Incision  -LH Additional Comments: mastisol, steri strips, mepilex  -LH    Retired Wound - Properties Group Date first assessed: 08/29/23 - Time first assessed: 0922 -LH Present on Hospital Admission: N  -LH Location: sternal  -LH Primary Wound Type: Incision  -LH Additional Comments: mastisol, steri strips, mepilex  -LH      Row Name             Wound 08/29/23 0922 Right lower leg Incision    Wound - Properties Group Placement Date: 08/29/23 -LH Placement Time: 0922 -LH Present on Hospital Admission: N  -LH Side: Right  -LH Orientation: lower  -LH Location: leg  -LH Primary Wound Type: Incision  -LH Additional Comments: mastisol, steristrips, mepilex, ace  -LH    Retired Wound - Properties Group Placement Date: 08/29/23 - Placement Time: 0922 -LH Present on Hospital Admission: N  -LH Side: Right  -LH Orientation: lower  -LH Location: leg  -LH Primary Wound Type: Incision  -LH Additional Comments: mastisol, steristrips, mepilex, ace  -LH    Retired Wound - Properties Group Date first assessed: 08/29/23 - Time first assessed: 0922 -LH Present on Hospital Admission: N  -LH Side: Right  -LH Location: leg  -LH Primary Wound Type: Incision  -LH Additional Comments: mastisol, steristrips, mepilex, ace  -LH      Row Name 09/02/23 0918          Plan of Care Review    Plan of Care Reviewed With patient  -LY     Progress improving  -LY     Outcome Evaluation PT tx completed. Requires min x1 for bed mobility while maintaining sternal precautions. SBA for  sit to stands. Ascends/descends 4 stairs with CGA. Amb 500' with SBA with no SOA noted. Will cont to follow.  -LY       Row Name 09/02/23 0918          Positioning and Restraints    Pre-Treatment Position in bed  -LY     Post Treatment Position bed  -LY     In Bed fowlers;call light within reach;encouraged to call for assist;notified nsg;with family/caregiver  -LY               User Key  (r) = Recorded By, (t) = Taken By, (c) = Cosigned By      Initials Name Provider Type     Vilma Oneil, RN Registered Nurse    Masha Coreas, PTA Physical Therapist Assistant                    Physical Therapy Education       Title: PT OT SLP Therapies (In Progress)       Topic: Physical Therapy (In Progress)       Point: Mobility training (Done)       Learning Progress Summary             Patient Acceptance, E, IBETH,EMMETT,NR by XOCHITL at 8/30/2023 0925    Comment: benefits of activity, progression of PT, sternal prec                         Point: Home exercise program (Not Started)       Learner Progress:  Not documented in this visit.              Point: Body mechanics (Not Started)       Learner Progress:  Not documented in this visit.              Point: Precautions (Done)       Learning Progress Summary             Patient Acceptance, E, IBETH,EMMETT,NR by XOCHITL at 8/30/2023 0925    Comment: benefits of activity, progression of PT, sternal prec                                         User Key       Initials Effective Dates Name Provider Type Gurvinder    XOCHITL 02/03/23 -  Kevin Maxwell, PT DPT Physical Therapist PT                  PT Recommendation and Plan     Plan of Care Reviewed With: patient  Progress: improving  Outcome Evaluation: PT tx completed. Requires min x1 for bed mobility while maintaining sternal precautions. SBA for sit to stands. Ascends/descends 4 stairs with CGA. Amb 500' with SBA with no SOA noted. Will cont to follow.       Time Calculation:    PT Charges       Row Name 09/02/23 0947             Time Calculation     Start Time 0918  -LY      Stop Time 0933  -LY      Time Calculation (min) 15 min  -LY      PT Received On 09/02/23  -LY         Time Calculation- PT    Total Timed Code Minutes- PT 15 minute(s)  -LY         Timed Charges    04497 - Gait Training Minutes  15  -LY         Total Minutes    Timed Charges Total Minutes 15  -LY       Total Minutes 15  -LY                User Key  (r) = Recorded By, (t) = Taken By, (c) = Cosigned By      Initials Name Provider Type    LY Masha Alcala PTA Physical Therapist Assistant                  Therapy Charges for Today       Code Description Service Date Service Provider Modifiers Qty    65018462379 HC GAIT TRAINING EA 15 MIN 9/2/2023 Masha Alcala PTA GP 1            PT G-Codes  Outcome Measure Options: AM-PAC 6 Clicks Basic Mobility (PT)  AM-PAC 6 Clicks Score (PT): 18    Masha Alcala PTA  9/2/2023

## 2023-09-03 ENCOUNTER — APPOINTMENT (OUTPATIENT)
Dept: GENERAL RADIOLOGY | Facility: HOSPITAL | Age: 78
DRG: 236 | End: 2023-09-03
Payer: MEDICARE

## 2023-09-03 ENCOUNTER — READMISSION MANAGEMENT (OUTPATIENT)
Dept: CALL CENTER | Facility: HOSPITAL | Age: 78
End: 2023-09-03
Payer: MEDICARE

## 2023-09-03 VITALS
SYSTOLIC BLOOD PRESSURE: 146 MMHG | RESPIRATION RATE: 16 BRPM | TEMPERATURE: 97.8 F | HEIGHT: 68 IN | BODY MASS INDEX: 28.61 KG/M2 | WEIGHT: 188.8 LBS | OXYGEN SATURATION: 95 % | HEART RATE: 80 BPM | DIASTOLIC BLOOD PRESSURE: 80 MMHG

## 2023-09-03 LAB
ANION GAP SERPL CALCULATED.3IONS-SCNC: 10 MMOL/L (ref 5–15)
BASOPHILS # BLD AUTO: 0.03 10*3/MM3 (ref 0–0.2)
BASOPHILS NFR BLD AUTO: 0.4 % (ref 0–1.5)
BUN SERPL-MCNC: 16 MG/DL (ref 8–23)
BUN/CREAT SERPL: 17.8 (ref 7–25)
CALCIUM SPEC-SCNC: 8.2 MG/DL (ref 8.6–10.5)
CHLORIDE SERPL-SCNC: 100 MMOL/L (ref 98–107)
CO2 SERPL-SCNC: 25 MMOL/L (ref 22–29)
CREAT SERPL-MCNC: 0.9 MG/DL (ref 0.76–1.27)
DEPRECATED RDW RBC AUTO: 41.6 FL (ref 37–54)
EGFRCR SERPLBLD CKD-EPI 2021: 88.5 ML/MIN/1.73
EOSINOPHIL # BLD AUTO: 0.2 10*3/MM3 (ref 0–0.4)
EOSINOPHIL NFR BLD AUTO: 2.7 % (ref 0.3–6.2)
ERYTHROCYTE [DISTWIDTH] IN BLOOD BY AUTOMATED COUNT: 13.2 % (ref 12.3–15.4)
GLUCOSE BLDC GLUCOMTR-MCNC: 113 MG/DL (ref 70–130)
GLUCOSE SERPL-MCNC: 162 MG/DL (ref 65–99)
HCT VFR BLD AUTO: 32.5 % (ref 37.5–51)
HGB BLD-MCNC: 10.9 G/DL (ref 13–17.7)
IMM GRANULOCYTES # BLD AUTO: 0.05 10*3/MM3 (ref 0–0.05)
IMM GRANULOCYTES NFR BLD AUTO: 0.7 % (ref 0–0.5)
LYMPHOCYTES # BLD AUTO: 0.98 10*3/MM3 (ref 0.7–3.1)
LYMPHOCYTES NFR BLD AUTO: 13.2 % (ref 19.6–45.3)
MCH RBC QN AUTO: 29.5 PG (ref 26.6–33)
MCHC RBC AUTO-ENTMCNC: 33.5 G/DL (ref 31.5–35.7)
MCV RBC AUTO: 87.8 FL (ref 79–97)
MONOCYTES # BLD AUTO: 1.14 10*3/MM3 (ref 0.1–0.9)
MONOCYTES NFR BLD AUTO: 15.4 % (ref 5–12)
NEUTROPHILS NFR BLD AUTO: 5.02 10*3/MM3 (ref 1.7–7)
NEUTROPHILS NFR BLD AUTO: 67.6 % (ref 42.7–76)
NRBC BLD AUTO-RTO: 0 /100 WBC (ref 0–0.2)
PLATELET # BLD AUTO: 221 10*3/MM3 (ref 140–450)
PMV BLD AUTO: 10.4 FL (ref 6–12)
POTASSIUM SERPL-SCNC: 4 MMOL/L (ref 3.5–5.2)
RBC # BLD AUTO: 3.7 10*6/MM3 (ref 4.14–5.8)
SODIUM SERPL-SCNC: 135 MMOL/L (ref 136–145)
WBC NRBC COR # BLD: 7.42 10*3/MM3 (ref 3.4–10.8)

## 2023-09-03 PROCEDURE — 71045 X-RAY EXAM CHEST 1 VIEW: CPT

## 2023-09-03 PROCEDURE — 82948 REAGENT STRIP/BLOOD GLUCOSE: CPT

## 2023-09-03 PROCEDURE — 80048 BASIC METABOLIC PNL TOTAL CA: CPT | Performed by: NURSE PRACTITIONER

## 2023-09-03 PROCEDURE — 25010000002 FUROSEMIDE PER 20 MG

## 2023-09-03 PROCEDURE — 85025 COMPLETE CBC W/AUTO DIFF WBC: CPT | Performed by: NURSE PRACTITIONER

## 2023-09-03 PROCEDURE — 25010000002 ENOXAPARIN PER 10 MG: Performed by: NURSE PRACTITIONER

## 2023-09-03 RX ORDER — CLOPIDOGREL BISULFATE 75 MG/1
75 TABLET ORAL DAILY
Qty: 30 TABLET | Refills: 2 | Status: SHIPPED | OUTPATIENT
Start: 2023-09-03

## 2023-09-03 RX ORDER — LISINOPRIL 10 MG/1
10 TABLET ORAL DAILY
Qty: 30 TABLET | Refills: 2 | Status: SHIPPED | OUTPATIENT
Start: 2023-09-03

## 2023-09-03 RX ORDER — HYDROCODONE BITARTRATE AND ACETAMINOPHEN 5; 325 MG/1; MG/1
1 TABLET ORAL EVERY 6 HOURS PRN
Qty: 20 TABLET | Refills: 0 | Status: SHIPPED | OUTPATIENT
Start: 2023-09-03 | End: 2023-09-11 | Stop reason: SDUPTHER

## 2023-09-03 RX ADMIN — METOPROLOL TARTRATE 50 MG: 50 TABLET, FILM COATED ORAL at 08:55

## 2023-09-03 RX ADMIN — OXYCODONE HYDROCHLORIDE 10 MG: 5 TABLET ORAL at 03:04

## 2023-09-03 RX ADMIN — POTASSIUM CHLORIDE 20 MEQ: 10 CAPSULE, COATED, EXTENDED RELEASE ORAL at 08:54

## 2023-09-03 RX ADMIN — PANTOPRAZOLE SODIUM 40 MG: 40 TABLET, DELAYED RELEASE ORAL at 05:58

## 2023-09-03 RX ADMIN — OXYCODONE HYDROCHLORIDE 5 MG: 5 TABLET ORAL at 08:55

## 2023-09-03 RX ADMIN — LISINOPRIL 10 MG: 10 TABLET ORAL at 08:55

## 2023-09-03 RX ADMIN — ASPIRIN 81 MG: 81 TABLET, COATED ORAL at 08:55

## 2023-09-03 RX ADMIN — POLYETHYLENE GLYCOL 3350 17 G: 17 POWDER, FOR SOLUTION ORAL at 08:54

## 2023-09-03 RX ADMIN — FUROSEMIDE 20 MG: 10 INJECTION, SOLUTION INTRAMUSCULAR; INTRAVENOUS at 08:55

## 2023-09-03 RX ADMIN — ENOXAPARIN SODIUM 40 MG: 100 INJECTION SUBCUTANEOUS at 08:54

## 2023-09-03 RX ADMIN — AMIODARONE HYDROCHLORIDE 400 MG: 200 TABLET ORAL at 08:55

## 2023-09-03 NOTE — OUTREACH NOTE
Prep Survey      Flowsheet Row Responses   [facility] discharged from? [place]   Is LACE score < 7 ? No   Eligibility [facility]   Date of Admission 08/29/23   Date of Discharge 09/03/23   Discharge Disposition Home or Self Care   Discharge diagnosis CORONARY ARTERY BYPASS GRAFTING X2   Does the patient have one of the following disease processes/diagnoses(primary or secondary)? Cardiothoracic surgery   Does the patient have Home health ordered? No   Is there a DME ordered? No   Prep survey completed? Yes            Joi CROWE - Registered Nurse

## 2023-09-03 NOTE — THERAPY DISCHARGE NOTE
Acute Care - Physical Therapy Discharge Summary  [facility]       Patient Name: Nehemiah Gomez  : 1945  MRN: [id number]    Today's Date: 9/3/2023                 Admit Date: 2023      PT Recommendation and Plan    Visit Dx:    ICD-10-CM ICD-9-CM   1. Impaired mobility [Z74.09 (ICD-10-CM)]  Z74.09 799.89   2. Coronary 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 heart with angina pectoris  I25.119 414.01     413.9   3. Coronary 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 heart without angina pectoris  I25.10 414.01                PT Rehab Goals       Row Name 23 1534             Bed Mobility Goal 1 (PT)    Activity/Assistive Device (Bed Mobility Goal 1, PT) sit to supine/supine to sit  -ROBER      Scott Level/Cues Needed (Bed Mobility Goal 1, PT) supervision required  -ROBER      Time Frame (Bed Mobility Goal 1, PT) long term goal (LTG);10 days  -ROBER      Progress/Outcomes (Bed Mobility Goal 1, PT) goal not met  -ROBER         Transfer Goal 1 (PT)    Activity/Assistive Device (Transfer Goal 1, PT) sit-to-stand/stand-to-sit;bed-to-chair/chair-to-bed  -ROBER      Scott Level/Cues Needed (Transfer Goal 1, PT) supervision required  -ROBER      Time Frame (Transfer Goal 1, PT) long term goal (LTG);10 days  -ROBER      Progress/Outcome (Transfer Goal 1, PT) goal not met  -ROBER         Gait Training Goal 1 (PT)    Activity/Assistive Device (Gait Training Goal 1, PT) gait (walking locomotion);decrease fall risk;improve balance and speed;increase endurance/gait distance  -ROBER      Scott Level (Gait Training Goal 1, PT) supervision required  -ROBER      Distance (Gait Training Goal 1, PT) 300 ft  -ROBER      Time Frame (Gait Training Goal 1, PT) long term goal (LTG);10 days  -ROBER      Progress/Outcome (Gait Training Goal 1, PT) goal met  -ROBER         Stairs Goal 1 (PT)    Activity/Assistive Device (Stairs Goal 1, PT) ascending stairs;descending stairs;using handrail, left;using handrail,  right  -ROBER      [place] Level/Cues Needed (Stairs Goal 1, PT) supervision required  -ROBER      Number of Stairs (Stairs Goal 1, PT) 4 steps  -ROBER      Time Frame (Stairs Goal 1, PT) long term goal (LTG);10 days  -ROBER      Progress/Outcome (Stairs Goal 1, PT) goal met  -ROBER                User Key  (r) = Recorded By, (t) = Taken By, (c) = Cosigned By      Initials Name Provider Type Discipline    Narciso Almanzar PTA Physical Therapist Assistant PT                        PT Discharge Summary  Anticipated Discharge Disposition (PT): home with assist  Reason for Discharge: Discharge from facility  Outcomes Achieved: Refer to plan of care for updates on goals achieved  Discharge Destination: Home with assist      Narciso Stewart PTA   9/3/2023

## 2023-09-03 NOTE — PLAN OF CARE
Goal Outcome Evaluation:  Plan of Care Reviewed With: patient           Outcome Evaluation: Pt NSR 68-86. Pt appers to have slept most of night. Pt complained of pain/soreness of chest incision. Pt was given prescribed pain meds. pt on RA and VSS.

## 2023-09-03 NOTE — PROGRESS NOTES
"Patient name: Nehemiah Gomez  Patient : 1945  VISIT # [id number]  MR #[id number]    Procedure:Procedure(s):  CORONARY ARTERY BYPASS GRAFTING X2 WITH INTERNAL MAMMARY ARTERY GRAFT, RIGHT LEG OPEN VEIN HARVEST, TRANSESOPHAGEAL ECHOCARDIOGRAM  Procedure Date:2023  POD:5 Days Post-Op    Subjective   Chest pain    Afebrile.  On room air.  Weight down 3 pounds overnight.  He is below baseline.  Creatinine 0.9, potassium 4.0.  Hemoglobin today 10.9.  Sternal wound without drainage.     Telemetry: Sinus, 70s       Objective     Visit Vitals  /72 (BP Location: Left arm, Patient Position: Lying)   Pulse 75   Temp 98.8 °F (37.1 °C) (Oral)   Resp 18   Ht 172 cm (67.72\")   Wt 85.6 kg (188 lb 12.8 oz)   SpO2 94%   BMI 28.95 kg/m²       Intake/Output Summary (Last 24 hours) at 9/3/2023 0632  Last data filed at 9/3/2023 0000  Gross per 24 hour   Intake 500 ml   Output 2450 ml   Net -1950 ml     LABS:    CBC  WBC   Date/Time Value Ref Range Status   2023 7.42 3.40 - 10.80 10*3/mm3 Final     RBC   Date/Time Value Ref Range Status   2023 3.70 (L) 4.14 - 5.80 10*6/mm3 Final     Hemoglobin   Date/Time Value Ref Range Status   2023 10.9 (L) 13.0 - 17.7 g/dL Final     Hematocrit   Date/Time Value Ref Range Status   2023 32.5 (L) 37.5 - 51.0 % Final     MCH   Date/Time Value Ref Range Status   2023 29.5 26.6 - 33.0 pg Final     MCHC   Date/Time Value Ref Range Status   2023 33.5 31.5 - 35.7 g/dL Final     RDW   Date/Time Value Ref Range Status   2023 13.2 12.3 - 15.4 % Final     RDW-SD   Date/Time Value Ref Range Status   2023 41.6 37.0 - 54.0 fl Final     MPV   Date/Time Value Ref Range Status   2023 10.4 6.0 - 12.0 fL Final     Platelets   Date/Time Value Ref Range Status   2023 221 140 - 450 10*3/mm3 Final       BMP  Glucose   Date/Time Value Ref Range Status   2023 162 (H) 65 - 99 mg/dL Final "     BUN   Date/Time Value Ref Range Status   09/03/2023 0433 16 8 - 23 mg/dL Final     Creatinine   Date/Time Value Ref Range Status   09/03/2023 0433 0.90 0.76 - 1.27 mg/dL Final     Sodium   Date/Time Value Ref Range Status   09/03/2023 0433 135 (L) 136 - 145 mmol/L Final     Potassium   Date/Time Value Ref Range Status   09/03/2023 0433 4.0 3.5 - 5.2 mmol/L Final     Chloride   Date/Time Value Ref Range Status   09/03/2023 0433 100 98 - 107 mmol/L Final     CO2   Date/Time Value Ref Range Status   09/03/2023 0433 25.0 22.0 - 29.0 mmol/L Final     Calcium   Date/Time Value Ref Range Status   09/03/2023 0433 8.2 (L) 8.6 - 10.5 mg/dL Final     BUN/Creatinine Ratio   Date/Time Value Ref Range Status   09/03/2023 0433 17.8 7.0 - 25.0 Final     Anion Gap   Date/Time Value Ref Range Status   09/03/2023 0433 10.0 5.0 - 15.0 mmol/L Final     eGFR   Date/Time Value Ref Range Status   09/03/2023 0433 88.5 >60.0 mL/min/1.73 Final     IMAGES:       Imaging Results (Last 24 Hours)       Procedure Component Value Units Date/Time    XR Chest 1 View [[id number]] Resulted: 09/03/23 0419     Updated: 09/03/23 0424    XR Chest 1 View [[id number]] Collected: 09/02/23 0819     Updated: 09/02/23 0824    Narrative:      EXAMINATION: XR CHEST 1 VW- 9/2/2023 8:19 AM CDT     HISTORY: Post-Op Heart Surgery.     REPORT: A frontal view of the chest was obtained.     COMPARISON: Chest x-ray 01/20/2023 0519 hours.     There is persistent volume loss at the left base and left midlung zone  without consolidation. The right lung is clear. Heart size is normal. No  pneumothorax is identified. There is mild blunting of the left  costophrenic angle suggesting a small effusion. Previous median  sternotomy is noted. The osseous structures are unremarkable.       Impression:      Interval increase in atelectasis in the left lung base with  questionable small pleural effusion. No pneumothorax is identified.  This report was finalized on 09/02/2023 08:21 by  Dr. Wayne Alarcon MD.          My image interpretation: Persistent left lower lobe atelectasis/effusion    Physical Exam:  General: Alert, oriented. No apparent distress.   Cardiovascular: Regular rate and rhythm without murmur, rubs, or gallops.    Pulmonary: Clear to auscultation bilaterally without wheezing, rubs, or rales.  Chest: Sternotomy incision clean and intact. Sternum stable. No clicks.    Abdomen: Soft, non distended, and non tender.  Extremities: Warm, moves all extremities. No edema. Saphenectomy site clean, dry, and intact.   Neurologic:  Grossly intact with no focal deficits.       Impression:  Coronary 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 heart with unstable angina  Hypertension  Hyperlipidemia  Former Smoker  Overweight, BMI 29.1  GERD  Hx of Prostate Cancer        Plan:  Continue to monitor sternal wound drainage closely at home  Routine cardiac surgery care   Pulmonary toilet and ambulation   Anticipate home today  Follow-up with Heike in the clinic in 1 week   Follow-up with Dr. Cheema in 6 weeks   Discussed with patient and nursing      NICANOR Rosario  09/03/23  06:32 CDT

## 2023-09-04 ENCOUNTER — TELEPHONE (OUTPATIENT)
Dept: CARDIAC SURGERY | Facility: CLINIC | Age: 78
End: 2023-09-04
Payer: MEDICARE

## 2023-09-04 ENCOUNTER — TRANSITIONAL CARE MANAGEMENT TELEPHONE ENCOUNTER (OUTPATIENT)
Dept: CALL CENTER | Facility: HOSPITAL | Age: 78
End: 2023-09-04
Payer: MEDICARE

## 2023-09-04 NOTE — DISCHARGE SUMMARY
[facility] Cardiothoracic Surgery  DISCHARGE SUMMARY        Date of Admission: 8/29/2023  Date of Discharge:  9/3/2023  Primary Care Physician: Erwin Ruelas MD    Discharge Diagnoses:  Active Hospital Problems    Diagnosis     **Coronary 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 heart with angina pectoris     CAD (coronary artery disease), native coronary artery     Coronary 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 heart        Procedures Performed:   oronary Artery Bypass, using arterial graft; single arterial graft, Coronary Artery bypass, using venous grafts and arterial grafts, 1 venous graft, CABG x2 (LIMA to LAD, SVG to OM1) by Dr. Cheema on 8/29/2023    HPI:  Mr. Nehemiah Gomez is a 77 y.o. male who presented to Dr. Cheema as an outpatient with unstable angina.  He began having pain with walking from his house to his barn which was described as a squeezing tightness in between Eldor blades and jaw.  Pain did resolve at rest.  He underwent stress test which was high risk for ischemia therefore subsequent coronary angiography was obtained revealing multivessel coronary artery disease.  Given this he was referred to Dr. Cheema for consideration for CABG.  Dr. Cheema discussed with him the risk and benefits of proceeding with surgery, the patient verbalized understanding and was agreeable to proceed.    Hospital Course: On 8/29/2023, Mr. Gomez was taken to the operating room for coronary artery bypass grafting.  See separate op note by Dr. Cheema detailing the operation.  Following surgery he was transferred to the ICU in stable but guarded condition.  He remained hemodynamically stable and was extubated without remark during the evening hours following surgery.  He demonstrated an intact neurological exam.  He was tolerating nasal cannula.  When all IV drips were weaned off and he was able to walk a lap around the ICU with physical therapy, he was  transferred to  on the afternoon of postop day 1.  The remainder of his hospital stay was significant for encouraging pulmonary toilet and ambulation, diuresis, and pain control.  Mediastinal chest tubes and left pleural drain was removed without remark.  He did have 1 episode of atrial fibrillation on postop day 3 and ultimately converted to normal sinus rhythm after initiation of amiodarone protocol.  He was also noted to have slight drainage from the sternal incision on postop day 2 that did improve throughout his hospital stay.  He is eating well and is demonstrated adequate bowel function is tolerating room air and has met all physical therapy goals.  Pacing wires were removed without remark and he meets criteria for discharge home on postop day 5.  The patient is agreeable to go home with his wife.    He will not be discharged home with Lasix as he is below his baseline weight at the time of discharge.  Routine follow-up with me in 1 week.    Condition on Discharge:  Neurologically intact and has good pain control.  He is eating well and has demonstrable good bowel function.  The sternum is stable without clicks and the saphenectomy incisions are healing nicely.  The heart is in normal sinus rhythm.  He has met all physical therapy criteria and verbalizes understanding of sternotomy precautions.   He verbalizes understanding of a separately handed out cardiac surgery handout.       Discharge Disposition:  Home or Self Care [1]    Discharge Medications:     Discharge Medications        New Medications        Instructions Start Date   clopidogrel 75 MG tablet  Commonly known as: PLAVIX   75 mg, Oral, Daily      HYDROcodone-acetaminophen 5-325 MG per tablet  Commonly known as: Norco   1 tablet, Oral, Every 6 Hours PRN      lisinopril 10 MG tablet  Commonly known as: PRINIVIL,ZESTRIL   10 mg, Oral, Daily             Changes to Medications        Instructions Start Date   sildenafil 100 MG tablet  Commonly known  as: VIAGRA  What changed: See the new instructions.   TAKE 1 TABLET ONCE DAILY AS NEEDED FOR ERECTILE DYSFUNCTION.             Continue These Medications        Instructions Start Date   amitriptyline 25 MG tablet  Commonly known as: ELAVIL   25 mg, Oral, Nightly      aspirin 81 MG EC tablet   81 mg, Oral, Daily      atorvastatin 40 MG tablet  Commonly known as: LIPITOR   40 mg, Oral, Nightly      esomeprazole 40 MG capsule  Commonly known as: nexIUM   40 mg, Oral, Daily      melatonin 5 MG tablet tablet   5 mg, Oral, Nightly PRN      metoprolol tartrate 50 MG tablet  Commonly known as: LOPRESSOR   50 mg, Oral, 2 Times Daily      nitroglycerin 0.4 MG SL tablet  Commonly known as: NITROSTAT   1 under the tongue as needed for angina, may repeat q5mins for up three doses             Stop These Medications      amLODIPine 5 MG tablet  Commonly known as: NORVASC              Discharge Diet: Regular diet     Discharge Care Plan / Instructions: Please see the separately handed out cardiac surgery handout.  He will not be referred to cardiac rehab at this time due to recent sternotomy.  We will reassess at his postop follow-up visit.    Activity at Discharge:   No heavy lifting greater than 5 pounds or a gallon of milk while maintaining sternal precautions.  Nehemiah Gomez has been instructed on an exercise  regiment as detailed in a handed out cardiac surgery handout.    Tobacco:  The patient does not use tobacco products and therefore does not need tobacco cessation education.    BMI:   Body mass index is 28.95 kg/m².  Refer to PCP for BMI management      Follow-up Appointments: Nehemiah Gomez  is requested to see Erwin Ruelas MD within 1-2 weeks from time of discharge, to see Heike VICK in 1 week, to follow-up with Dr. Cheema in 6 weeks,  and to follow-up with Dr. Garcia of the cardiology service in 6 weeks.

## 2023-09-04 NOTE — OUTREACH NOTE
Call Center TCM Note      Flowsheet Row Responses   [facility] facility patient discharged from? [place]   Does the patient have one of the following disease processes/diagnoses(primary or secondary)? Cardiothoracic surgery   TCM attempt successful? No   Unsuccessful attempts Attempt 2  [No verbal release]            Louann Betancourt LPN    9/4/2023, 16:02 CDT

## 2023-09-04 NOTE — OUTREACH NOTE
Call Center TCM Note      Flowsheet Row Responses   [facility] facility patient discharged from? [place]   Does the patient have one of the following disease processes/diagnoses(primary or secondary)? Cardiothoracic surgery   TCM attempt successful? No   Unsuccessful attempts Attempt 1            Louann Betancourt LPN    9/4/2023, 15:14 CDT

## 2023-09-05 ENCOUNTER — TRANSITIONAL CARE MANAGEMENT TELEPHONE ENCOUNTER (OUTPATIENT)
Dept: CALL CENTER | Facility: HOSPITAL | Age: 78
End: 2023-09-05
Payer: MEDICARE

## 2023-09-05 NOTE — OUTREACH NOTE
Call Center TCM Note      Flowsheet Row Responses   [facility] [place], operated by [facility] patient discharged from? [place]   Does the patient have one of the following disease processes/diagnoses(primary or secondary)? Cardiothoracic surgery   TCM attempt successful? Yes   Call start time 1326   Call end time 1333   Discharge diagnosis CORONARY ARTERY BYPASS GRAFTING X2   Medication alerts for this patient monitoring BP at home, 157/80, 89 before medications.   Meds reviewed with patient/caregiver? Yes   Is the patient having any side effects they believe may be caused by any medication additions or changes? No   Does the patient have all medications related to this admission filled (includes all antibiotics, pain medications, cardiac medications, etc.) Yes   Is the patient taking all medications as directed (includes completed medication regime)? Yes   Comments 9/11/2023 10:30 AM Arrive by 10:15 AM HOSPITAL FOLLOW UP   Does the patient have an appointment with their PCP within 7-14 days of discharge? Yes   Comments Pt reports no s/sx of infection at incision on chest or leg.Pain level is well controlled with minimal use of pain meds, currently rates pain 1/10. Denies LE edema, SOA, dizziness or chest pain.He is using IS up to 2500ml he reports and has no issues with appetite or voiding. His sleeping is WNL.   Did the patient receive a copy of their discharge instructions? Yes   Nursing interventions Reviewed instructions with patient   What is the patient's perception of their health status since discharge? Improving   Is the patient/caregiver able to teach back normal signs of recovery? Pain or discomfort at incisional site   Nursing interventions Reassured on normal signs of recovery   Is the patient /caregiver able to teach back basic post-op care? Continue use of incentive spirometry at least 1 week post discharge, Practice cough and deep breath every 4 hours while awake, Lifting as instructed by MD in discharge instructions   Is  the patient/caregiver able to teach back signs and symptoms of incisional infection? Increased redness, swelling or pain at the incisonal site, Increased drainage or bleeding   Is the patient/caregiver able to teach back steps to recovery at home? Set small, achievable goals for return to baseline health, Rest and rebuild strength, gradually increase activity   If the patient is a current smoker, are they able to teach back resources for cessation? Not a smoker   Is the patient/caregiver able to teach back the hierarchy of who to call/visit for symptoms/problems? PCP, Specialist, Home health nurse, Urgent Care, ED, 911 Yes   TCM call completed? Yes   Call end time 1333   Is the patient interested in additional calls from an ambulatory ? No            Dory Velázquez RN    9/5/2023, 13:34 CDT

## 2023-09-08 NOTE — PROGRESS NOTES
Subjective   Chief Complaint   Patient presents with    Post-op Follow-up     Pt had CABG x 2 on 08/29/2023       Patient ID: Nehemiah Gomez is a 77 y.o. male who is here for follow-up having had Coronary Artery Bypass, using arterial graft; single arterial graft, Coronary Artery bypass, using venous grafts and arterial grafts, 1 venous graft, CABG x2 (LIMA to LAD, SVG to OM1) by Dr. Cheema on 8/29/2023     History of Present Illness  Post operative recovery was uneventful without any major complications. Sleep habits are fair. Pain control has been good.  . No fevers/sweats/chills. No drainage from incisions.  No sternal clicks. No chest pain or shortness of breath. Appetite is good.  He is not diabetic and he does not smoke.  Ambulating 10-15 minutes twice daily.  Weight today is down 4 pounds since discharge.  He was not discharged home with Lasix as he was below his baseline weight.  He states overall he is doing very well.    The following portions of the patient's history were reviewed and updated as appropriate: allergies, current medications, past family history, past medical history, past social history, past surgical history and problem list.    Review of Systems   Constitutional:  Negative for chills, diaphoresis, fatigue and fever.   HENT:  Negative for trouble swallowing and voice change.    Eyes:  Negative for visual disturbance.   Respiratory:  Negative for chest tightness and shortness of breath.    Cardiovascular:  Negative for chest pain, palpitations and leg swelling.   Gastrointestinal:  Negative for abdominal pain, constipation, diarrhea, nausea and vomiting.   Musculoskeletal:  Negative for arthralgias and myalgias.   Skin:  Negative for color change, pallor, rash and wound.   Neurological:  Negative for dizziness, syncope and light-headedness.   Psychiatric/Behavioral:  Negative for agitation, confusion and sleep disturbance.      Objective   Visit Vitals  /62 (BP Location: Right arm,  "Patient Position: Sitting, Cuff Size: Adult)   Pulse 65   Ht 172 cm (67.72\")   Wt 83.5 kg (184 lb)   SpO2 97%   BMI 28.21 kg/m²       Physical Exam  Vitals reviewed.   Constitutional:       General: He is not in acute distress.  HENT:      Head: Normocephalic.   Eyes:      Pupils: Pupils are equal, round, and reactive to light.   Cardiovascular:      Rate and Rhythm: Normal rate and regular rhythm.      Heart sounds: Normal heart sounds. No murmur heard.  Pulmonary:      Breath sounds: Normal breath sounds. No wheezing or rales.   Abdominal:      General: There is no distension.      Palpations: Abdomen is soft.      Tenderness: There is no abdominal tenderness.   Musculoskeletal:         General: No swelling or tenderness.   Skin:     General: Skin is warm and dry.      Comments: Sternum is stable, no clicks.  Sternal incision is clean dry and intact and healing nicely.  Saphenectomy site is clean dry and intact.   Neurological:      General: No focal deficit present.      Mental Status: He is alert and oriented to person, place, and time.   Psychiatric:         Mood and Affect: Mood normal.         Behavior: Behavior normal.         Thought Content: Thought content normal.         Judgment: Judgment normal.         Assessment & Plan     Diagnoses and all orders for this visit:    1. Coronary 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 heart with angina pectoris (Primary)    2. Primary hypertension    3. Lung nodule           Overall, Nehemiah Gomez is doing well.  Surgical incisions are healing nicely.  Continue cleaning with antibacterial soap and water.  No need for Lasix as patient remains well below his baseline weight.  We did discuss the incidental finding of lung nodule on preoperative CT chest without contrast.  I have advised patient that follow-up imaging and appointment will be scheduled at his 6-week follow-up with Dr. Cheema.  We discussed current sternotomy precautions and how these will " not be advanced yet. Following post op cardiac surgery home instructions. Provided support and encouragement. All questions have been answered to the best of my ability. Will discuss starting cardiac rehabilitation at official 1 month post op visit. Patient has follow up with Dr. Cheema in a few weeks. Patient has follow up with Cardiology in a few weeks. Patient has been instructed to contact our office with any questions or concerns should they arise prior to the next office visit.  Keep scheduled follow-up with Dr. Cheema next month.  Call the office sooner should any issues arise.  Patient and his wife verbalized understanding and are agreeable.     Nehemiah Gomez is a non-smoker and therefore does not need tobacco cessation education/counseling.      Advance Care Planning   ACP discussion was held with the patient during this visit. Patient does not have an advance directive, declines further assistance.

## 2023-09-11 ENCOUNTER — OFFICE VISIT (OUTPATIENT)
Dept: CARDIAC SURGERY | Facility: CLINIC | Age: 78
End: 2023-09-11
Payer: MEDICARE

## 2023-09-11 ENCOUNTER — OFFICE VISIT (OUTPATIENT)
Dept: FAMILY MEDICINE CLINIC | Facility: CLINIC | Age: 78
End: 2023-09-11
Payer: MEDICARE

## 2023-09-11 VITALS
OXYGEN SATURATION: 98 % | HEART RATE: 72 BPM | TEMPERATURE: 97.1 F | HEIGHT: 68 IN | WEIGHT: 184.8 LBS | DIASTOLIC BLOOD PRESSURE: 66 MMHG | BODY MASS INDEX: 28.01 KG/M2 | RESPIRATION RATE: 18 BRPM | SYSTOLIC BLOOD PRESSURE: 118 MMHG

## 2023-09-11 VITALS
DIASTOLIC BLOOD PRESSURE: 62 MMHG | HEIGHT: 68 IN | SYSTOLIC BLOOD PRESSURE: 108 MMHG | BODY MASS INDEX: 27.89 KG/M2 | WEIGHT: 184 LBS | OXYGEN SATURATION: 97 % | HEART RATE: 65 BPM

## 2023-09-11 DIAGNOSIS — R91.1 LUNG NODULE: ICD-10-CM

## 2023-09-11 DIAGNOSIS — I25.10 CORONARY ARTERY DISEASE INVOLVING NATIVE CORONARY ARTERY OF NATIVE HEART WITHOUT ANGINA PECTORIS: ICD-10-CM

## 2023-09-11 DIAGNOSIS — I25.118 CORONARY ARTERY DISEASE INVOLVING NATIVE CORONARY ARTERY OF NATIVE HEART WITH OTHER FORM OF ANGINA PECTORIS: Primary | ICD-10-CM

## 2023-09-11 DIAGNOSIS — I10 PRIMARY HYPERTENSION: ICD-10-CM

## 2023-09-11 DIAGNOSIS — I25.119 CORONARY ARTERY DISEASE INVOLVING NATIVE CORONARY ARTERY OF NATIVE HEART WITH ANGINA PECTORIS: Primary | ICD-10-CM

## 2023-09-11 PROCEDURE — 3074F SYST BP LT 130 MM HG: CPT | Performed by: NURSE PRACTITIONER

## 2023-09-11 PROCEDURE — 1160F RVW MEDS BY RX/DR IN RCRD: CPT | Performed by: NURSE PRACTITIONER

## 2023-09-11 PROCEDURE — 99024 POSTOP FOLLOW-UP VISIT: CPT | Performed by: NURSE PRACTITIONER

## 2023-09-11 PROCEDURE — 1159F MED LIST DOCD IN RCRD: CPT | Performed by: NURSE PRACTITIONER

## 2023-09-11 PROCEDURE — 3078F DIAST BP <80 MM HG: CPT | Performed by: NURSE PRACTITIONER

## 2023-09-11 RX ORDER — HYDROCODONE BITARTRATE AND ACETAMINOPHEN 5; 325 MG/1; MG/1
1 TABLET ORAL EVERY 6 HOURS PRN
Qty: 20 TABLET | Refills: 0 | Status: SHIPPED | OUTPATIENT
Start: 2023-09-11

## 2023-09-11 NOTE — PROGRESS NOTES
Subjective   Nehemiah Gomez is a 77 y.o. male.     Chief Complaint   Patient presents with    Hospital Follow Up Visit        History of Present Illness     He recently had cabg---feels good without cp or ha...      Current Outpatient Medications:     amitriptyline (ELAVIL) 25 MG tablet, Take 1 tablet by mouth Every Night., Disp: 90 tablet, Rfl: 3    aspirin 81 MG EC tablet, Take 1 tablet by mouth Daily., Disp: , Rfl:     atorvastatin (LIPITOR) 40 MG tablet, Take 1 tablet by mouth Every Night., Disp: 90 tablet, Rfl: 3    clopidogrel (PLAVIX) 75 MG tablet, Take 1 tablet by mouth Daily., Disp: 30 tablet, Rfl: 2    esomeprazole (nexIUM) 40 MG capsule, TAKE 1 CAPSULE BY MOUTH DAILY, Disp: 90 capsule, Rfl: 1    HYDROcodone-acetaminophen (Norco) 5-325 MG per tablet, Take 1 tablet by mouth Every 6 (Six) Hours As Needed for Moderate Pain., Disp: 20 tablet, Rfl: 0    lisinopril (PRINIVIL,ZESTRIL) 10 MG tablet, Take 1 tablet by mouth Daily., Disp: 30 tablet, Rfl: 2    melatonin 5 MG tablet tablet, Take 1 tablet by mouth At Night As Needed., Disp: , Rfl:     metoprolol tartrate (LOPRESSOR) 50 MG tablet, Take 1 tablet by mouth 2 (Two) Times a Day., Disp: 180 tablet, Rfl: 3    nitroglycerin (NITROSTAT) 0.4 MG SL tablet, 1 under the tongue as needed for angina, may repeat q5mins for up three doses, Disp: 100 tablet, Rfl: 11    sildenafil (VIAGRA) 100 MG tablet, TAKE 1 TABLET ONCE DAILY AS NEEDED FOR ERECTILE DYSFUNCTION. (Patient taking differently: Take 1 tablet by mouth As Needed for Erectile Dysfunction.), Disp: 20 tablet, Rfl: 0  No Known Allergies           Facility age limit for growth percentiles is 20 years.    Past Medical History:   Diagnosis Date    Coronary 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 heart with angina pectoris 07/19/2023    Diverticulosis     Erectile disorder due to 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     GERD (gastroesophageal reflux disease)     Heart murmur     Hyperlipidemia      "Hypertension     Insomnia     Kidney stone     PONV (postoperative nausea and vomiting)     Prostate cancer      Past Surgical History:   Procedure Laterality Date    CARDIAC CATHETERIZATION N/A 07/24/2023    Procedure: Left Heart Cath;  Surgeon: Blayne Garcia MD;  Location:  [facility];  Service: Cardiology;  Laterality: N/A;    CHOLECYSTECTOMY      COLONOSCOPY  03/13/2006    left sided diverticulosis    CORONARY ARTERY BYPASS GRAFT N/A 8/29/2023    Procedure: CORONARY ARTERY BYPASS GRAFTING X2 WITH INTERNAL MAMMARY ARTERY GRAFT, RIGHT LEG OPEN VEIN HARVEST, TRANSESOPHAGEAL ECHOCARDIOGRAM;  Surgeon: Ernesto Cheema MD;  Location: [facility] OR;  Service: Cardiothoracic;  Laterality: N/A;    ENDOSCOPY  03/20/2006    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    HENT: Negative.     Eyes: Negative.    Respiratory: Negative.     Cardiovascular: Negative.    Gastrointestinal: Negative.    Endocrine: Negative.    Genitourinary: Negative.    Musculoskeletal: Negative.    Skin:  Positive for wound.   Allergic/Immunologic: Negative.    Neurological: Negative.    Hematological: Negative.    Psychiatric/Behavioral: Negative.       Objective /66 (BP Location: Right arm, Patient Position: Sitting, Cuff Size: Adult)   Pulse 72   Temp 97.1 °F (36.2 °C) (Infrared)   Resp 18   Ht 172 cm (67.72\")   Wt 83.8 kg (184 lb 12.8 oz)   SpO2 98%   BMI 28.33 kg/m²    Physical Exam  Vitals and nursing note reviewed.   Constitutional:       Appearance: Normal appearance. He is normal weight.   HENT:      Head: Normocephalic and atraumatic.      Nose: Nose normal.      Mouth/Throat:      Mouth: Mucous membranes are moist.   Eyes:      Extraocular Movements: Extraocular movements intact.      " Pupils: Pupils are equal, round, and reactive to light.   Cardiovascular:      Rate and Rhythm: Normal rate and regular rhythm.      Pulses: Normal pulses.      Heart sounds: Normal heart sounds.   Pulmonary:      Effort: Pulmonary effort is normal.      Breath sounds: Normal breath sounds.   Abdominal:      General: Abdomen is flat. Bowel sounds are normal.      Palpations: Abdomen is soft.   Musculoskeletal:         General: Normal range of motion.      Cervical back: Normal range of motion and neck supple.   Skin:     General: Skin is warm.      Capillary Refill: Capillary refill takes less than 2 seconds.   Neurological:      General: No focal deficit present.      Mental Status: He is alert and oriented to person, place, and time. Mental status is at baseline.   Psychiatric:         Mood and Affect: Mood normal.   Noted healing mid-sternum wound--no evidence of drainage or erythema    Assessment & Plan   Diagnoses and all orders for this visit:    1. Coronary 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 heart with other form of angina pectoris (Primary)    He will contuinue wound care ---he will keep appt with cardiology and ct surgery             No orders of the defined types were placed in this encounter.      Follow up: 4 month(s)

## 2023-09-12 ENCOUNTER — READMISSION MANAGEMENT (OUTPATIENT)
Dept: CALL CENTER | Facility: HOSPITAL | Age: 78
End: 2023-09-12
Payer: MEDICARE

## 2023-09-12 NOTE — OUTREACH NOTE
CT Surgery Week 2 Survey      Flowsheet Row Responses   [facility] patient discharged from? [place]   Does the patient have one of the following disease processes/diagnoses(primary or secondary)? Cardiothoracic surgery   Week 2 attempt successful? Yes   Call start time 1726   Call end time 1729   Discharge diagnosis CORONARY ARTERY BYPASS GRAFTING X2   Is the patient taking all medications as directed (includes completed medication regime)? Yes   Does the patient have a primary care provider?  Yes   Does the patient have an appointment scheduled with their C/T surgeon? Yes   Has the patient kept scheduled appointments due by today? Yes   Psychosocial issues? No   What is the patient's perception of their health status since discharge? Improving   Is the patient /caregiver able to teach back basic post-op care? Practice cough and deep breath every 4 hours while awake, Hold pillow to support chest when coughing, Shower daily, Use a clean wash cloth and antibacterial bar or liquid soap to clean incisions   Is the patient/caregiver able to teach back signs and symptoms of incisional infection? Increased redness, swelling or pain at the incisonal site, Increased drainage or bleeding, Incisional warmth, Pus or odor from incision, Fever   Is the patient/caregiver able to teach back steps to recovery at home? Set small, achievable goals for return to baseline health, Rest and rebuild strength, gradually increase activity, Eat a well-balance diet   If the patient is a current smoker, are they able to teach back resources for cessation? Not a smoker   Is the patient/caregiver able to teach back the hierarchy of who to call/visit for symptoms/problems? PCP, Specialist, Home health nurse, Urgent Care, ED, 911 Yes   Additional teach back comments States he is doing well. Has been to follow up appts.   Week 2 call completed? Yes   Graduated Yes   Graduated/Revoked comments Denies questions or needs at this time.   Call end  time [id number]            Louann YATES - Licensed Nurse

## 2023-09-22 ENCOUNTER — OFFICE VISIT (OUTPATIENT)
Dept: CARDIOLOGY | Facility: CLINIC | Age: 78
End: 2023-09-22
Payer: MEDICARE

## 2023-09-22 VITALS
HEART RATE: 60 BPM | WEIGHT: 182 LBS | SYSTOLIC BLOOD PRESSURE: 118 MMHG | BODY MASS INDEX: 27.58 KG/M2 | DIASTOLIC BLOOD PRESSURE: 76 MMHG | OXYGEN SATURATION: 98 % | HEIGHT: 68 IN

## 2023-09-22 DIAGNOSIS — I10 PRIMARY HYPERTENSION: ICD-10-CM

## 2023-09-22 DIAGNOSIS — E78.2 MIXED HYPERLIPIDEMIA: ICD-10-CM

## 2023-09-22 DIAGNOSIS — I25.10 CORONARY ARTERY DISEASE INVOLVING NATIVE CORONARY ARTERY OF NATIVE HEART WITHOUT ANGINA PECTORIS: Primary | ICD-10-CM

## 2023-09-22 NOTE — PROGRESS NOTES
"Chief Complaint  Coronary Artery Disease (4 WK FU-CAD-S/P CABG X2 08/29/2023 WITH DR.AUSTIN ALVAREZ)    Subjective      Nehemiah Gomez presents to [facility] C[facility]  History of Present Illness    Nehemiah underwent CABG on 8/29/2023.  At that time he underwent left internal mammary grafting to the LAD and individual vein graft to the first circumflex marginal branch.  This was done because of significant left main coronary stenosis.  Nehemiah is doing well.  His incisions are all healing without difficulty.  He is asking when he can ride a lawnmower and how long he should take his Plavix.  He is interested in going to cardiac rehab in [place] and is going to work through Dr. Ruelas for this.    Objective   Vital Signs:  /76 (BP Location: Left arm, Patient Position: Sitting, Cuff Size: Adult)   Pulse 60   Ht 172 cm (67.72\")   Wt 82.6 kg (182 lb)   SpO2 98%   BMI 27.90 kg/m²   Estimated body mass index is 27.9 kg/m² as calculated from the following:    Height as of this encounter: 172 cm (67.72\").    Weight as of this encounter: 82.6 kg (182 lb).            Physical Exam    A 77-year-old male in no apparent distress.  He is awake, alert and oriented.  HEENT: No scleral icterus.  Neck: No jugular venous distention.  No carotid bruits.  Lungs: Clear to auscultation.  Heart: Regular with normal S1 and S2 and no murmurs or gallops sounds.  Abdomen: No tenderness or organomegaly.  Extremities: Endoscopic venous harvesting sites look good.  No edema.  Intact pulses.  Neurologic: No focal abnormalities.      Result Review :                   Assessment and Plan   Diagnoses and all orders for this visit:    1. Coronary 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 heart without angina pectoris (Primary)    2. Primary hypertension    3. Mixed hyperlipidemia    1.  Coronary artery disease.  He is recovering nicely from bypass surgery.  It is recommended that he continue Plavix for 3 " months post op.  I feel that he may use his riding mower at 3 months as well but ask him to discuss this with Dr. Cheema.  He sees Dr. Cheema on 16 October.  I believe Dr. Cheema will choose the appropriate time to release the patient to drive.  The patient is encouraged to go to cardiac rehab.  He is working through  to get this set up.  Continue lifelong aspirin and high intensity statin therapy as well as beta-blocker therapy.    2.  Hypertension.  Blood pressure is well controlled on today's measurement.  Continue metoprolol tartrate 50 mg twice daily.  At some point this could probably be changed to succinate so that he would only need to take it once daily.    3.  Hyperlipidemia.  Continue high intensity statin therapy with atorvastatin 40 mg daily.  Lipid panel on 821 before bypass surgery showed total cholesterol of 138 with LDL of 73 and HDL of 41.  I will order another 1 at our next clinic visit in 6 months if he has not had 1 in the interim.    All questions are answered.  He is encouraged to contact us for any further concerns.  A follow-up visit is scheduled for 6 months from now.    As always, I certainly appreciate the opportunity to assist in the care of your patients and I am glad I have become acquainted with this fine gentleman.      There are no Patient Instructions on file for this visit.       Follow Up   Return in about 6 months (around 3/22/2024) for Next scheduled follow up.  Patient was given instructions and counseling regarding his condition or for health maintenance advice. Please see specific information pulled into the AVS if appropriate.

## 2023-10-16 ENCOUNTER — OFFICE VISIT (OUTPATIENT)
Dept: CARDIAC SURGERY | Facility: CLINIC | Age: 78
End: 2023-10-16
Payer: MEDICARE

## 2023-10-16 VITALS
SYSTOLIC BLOOD PRESSURE: 122 MMHG | DIASTOLIC BLOOD PRESSURE: 68 MMHG | WEIGHT: 187.6 LBS | OXYGEN SATURATION: 97 % | BODY MASS INDEX: 28.43 KG/M2 | HEIGHT: 68 IN | HEART RATE: 54 BPM

## 2023-10-16 DIAGNOSIS — I25.119 CORONARY ARTERY DISEASE INVOLVING NATIVE CORONARY ARTERY OF NATIVE HEART WITH ANGINA PECTORIS: Primary | ICD-10-CM

## 2023-10-16 DIAGNOSIS — I25.10 LEFT MAIN CORONARY ARTERY DISEASE: Primary | ICD-10-CM

## 2023-10-16 NOTE — PROGRESS NOTES
"[facility] Cardiothoracic Surgery  PROGRESS NOTE          Procedure Performed: CABG x2 (LIMA to LAD, SVG to OM1)  Date of Procedure: 08/29/2023    Subjective:  The patient states that he has been feeling well. He reports that he is feeling better than he did before surgery. He states that he is no longer having chest pain. He notes that everything has healed up great. He reports that he is currently following up with Dr. Garcia and has already had a follow-up appointment. He states his next appointment is not until another 6 months. He reports the lisinopril that he was prescribed has been making him cough a lot lately.      Objective:      10/16/23  1301   Weight: 85.1 kg (187 lb 9.6 oz)       PE:  Visit Vitals  /68 (BP Location: Right arm, Patient Position: Sitting, Cuff Size: Adult)   Pulse 54   Ht 172 cm (67.72\")   Wt 85.1 kg (187 lb 9.6 oz)   SpO2 97%   BMI 28.76 kg/m²        GENERAL: NAD, resting comfortably, normal color  CARDIOVASCULAR: regular, regular rate, sinus, well-healed sternotomy with stable sternum  PULMONARY: Normal bilateral breath sounds, no labored breathing  ABDOMEN: soft, nt/nd  EXTREMITIES: mild peripheral edema, normal pulses, normal ROM               Lab Results (last 72 hours)      ** No results found for the last 72 hours. **             Assessment/Plan      Mr. Gomez is a 77-year-old male who is now 6 weeks status post coronary bypass grafting x2. He did very well with surgery and I am very happy with his outcome. He has had no problem with wound healing, he has been very active, he has more energy and no recurring anginal type symptoms. He has followed up with Dr. Garcia with cardiology, he has scheduled follow up with him in the future as well.    We discussed returning to normal activity on a progressive basis, he understands and agrees. We discussed returning to driving which is okay. I have recommended he go to cardiac rehab, he prefers [facility]. I would prefer he remain on dual antiplatelet therapy for at least 3 months after surgery, then okay to transition to aspirin only. He is also complaining of a cough that he is attributing to his ace inhibitor, we will change this to an ARB today, losartan. I discussed this with him.    He can follow up with us on an as-needed basis. Thank you for trusting me with the care of Mr. Gomez. Please do not hesitate to call with any questions or concerns.       Ernesto Cheema MD   Cardiothoracic Surgeon    Transcribed from ambient dictation for Ernesto Cheema MD by Conchis Shannon.  10/16/23   15:01 CDT    Patient or patient representative verbalized consent to the visit recording.  I have personally performed the services described in this document as transcribed by the above individual, and it is both accurate and complete.    Answers submitted by the patient for this visit:  Primary Reason for Visit (Submitted on 10/15/2023)  What is the primary reason for your visit?: Other  Other (Submitted on 10/15/2023)  Please describe your symptoms.: Open heart surgery 8/29/23  Have you had these symptoms before?: No  How long have you been having these symptoms?: Greater than 2 weeks

## 2023-10-16 NOTE — LETTER
October 20, 2023       No Recipients    Patient: Nehemiah Gomez   YOB: 1945   Date of Visit: 10/16/2023       Dear Erwin Ruelas MD,    Nehemiah Gomez was in my office today. Below are the relevant portions of my assessment and plan of care.    Mr. Gomez is a 77-year-old male who is now 6 weeks status post coronary bypass grafting x2. He did very well with surgery and I am very happy with his outcome. He has had no problem with wound healing, he has been very active, he has more energy and no recurring anginal type symptoms. He has followed up with Dr. Garcia with cardiology, he has scheduled follow up with him in the future as well.    We discussed returning to normal activity on a progressive basis, he understands and agrees. We discussed returning to driving which is okay. I have recommended he go to cardiac rehab, he prefers [facility]. I would prefer he remain on dual antiplatelet therapy for at least 3 months after surgery, then okay to transition to aspirin only. He is also complaining of a cough that he is attributing to his ace inhibitor, we will change this to an ARB today, losartan. I discussed this with him.    He can follow up with us on an as-needed basis. Thank you for trusting me with the care of Mr. Gomez. Please do not hesitate to call with any questions or concerns.         Sincerely,        Ernesto Cheema MD        CC:   No Recipients

## 2023-10-18 ENCOUNTER — TELEPHONE (OUTPATIENT)
Dept: CARDIAC SURGERY | Facility: CLINIC | Age: 78
End: 2023-10-18
Payer: MEDICARE

## 2023-10-18 RX ORDER — LOSARTAN POTASSIUM 25 MG/1
25 TABLET ORAL DAILY
Qty: 30 TABLET | Refills: 2 | Status: SHIPPED | OUTPATIENT
Start: 2023-10-18

## 2023-10-18 NOTE — TELEPHONE ENCOUNTER
After reviewing Dr. Cheema's note, he states he had planned to send losartan as replacement for lisinopril.  I have sent this to his pharmacy.  Please notify patient.

## 2023-10-18 NOTE — TELEPHONE ENCOUNTER
Pt calling today asking about substitute medication for lisinopril that was supposed to be sent in by Dr. Cheema on 10/16. I do not see where this has been ordered and sent in. Discussed with Heike and she will discuss with Dr. Cheema. Will call pt back by tomorrow with an answer. Pt uses [facility] in [place].

## 2023-11-14 ENCOUNTER — TELEPHONE (OUTPATIENT)
Dept: UROLOGY | Facility: CLINIC | Age: 78
End: 2023-11-14
Payer: MEDICARE

## 2023-11-14 NOTE — TELEPHONE ENCOUNTER
Called pt. Back, he thought he was scheduled for a biopsy on 12/14 with Dr. Sheehan. Informed pt. His appt. On the 14th is for a regular office visit with labs and MRI  prior, so no need to stop anticoagulants at this time and he could discuss with Dr. Sheehan at that visit if biopsy was needed. Pt. V/u.

## 2023-11-14 NOTE — TELEPHONE ENCOUNTER
Caller: Nehemiah Gomez     Relationship: [unfilled] SELF    Best call back number: [phone]    What is your medical concern? PT RECENTLY HAD HEART SURGERY AND IS TAKING PLAVIX. PT WOULD LIKE TO KNOW IF HE SHOULD STILL HAVE BIOPSY. PLEASE CALL PT TO ADVISE.THANK YOU

## 2023-11-20 ENCOUNTER — TELEPHONE (OUTPATIENT)
Dept: CARDIOLOGY | Facility: CLINIC | Age: 78
End: 2023-11-20
Payer: MEDICARE

## 2023-11-20 NOTE — TELEPHONE ENCOUNTER
Caller: Edin Gomez    Relationship: Self    Best call back number: [phone]     What is the best time to reach you: ANYTIME    Who are you requesting to speak with (clinical staff, provider,  specific staff member): ANYONE    Do you know the name of the person who called: EDIN    What was the call regarding: PATIENT CALLED IN ASKING IF HE CAN DISCONTINUE HIS PLAVIX. DR. ALVAREZ INDICATED HE COULD STOP TAKING IT IN 2-3 MONTHS. DR. ALVAREZ PERFORMED A DOUBLE BYPASS OPEN HEART SURGERY.     Is it okay if the provider responds through [facility]: NO, PLEASE CALL.

## 2023-12-05 NOTE — PROGRESS NOTES
Chief Complaint  Prostate Cancer    Subjective          Nehemiah Gomez presents to [facility] to follow-up prostate cancer.  He recently underwent coronary artery bypass graft surgery for unstable angina.  His chest pain is resolved.  He said he feels much better.  Patient denies any possible systemic symptoms of prostate cancer such as weight loss, lower extremity edema, or skeletal pain that could be worrisome for systemic disease.      Urologic history  -06/19/2020; multiparmetric MRI prostate: PI-RADS 4 lesion in right lateral apex peripheral zone.  -07/01/2020: UroNav Fusion TRUS biopsy prostate: Pavel grade 3+3 = 6 in 15% of targeted lesion  111/27/2021: multiparametric MRI prostate: PI-RADS 4 lesion in right lateral apex peripheral zone (previously biopsied) and PI-RADS4 lesion in the left mid anterior transition zone >1cm.       Current Outpatient Medications:     amitriptyline (ELAVIL) 25 MG tablet, Take 1 tablet by mouth Every Night., Disp: 90 tablet, Rfl: 3    aspirin 81 MG EC tablet, Take 1 tablet by mouth Daily., Disp: , Rfl:     atorvastatin (LIPITOR) 40 MG tablet, Take 1 tablet by mouth Every Night., Disp: 90 tablet, Rfl: 3    esomeprazole (nexIUM) 40 MG capsule, TAKE 1 CAPSULE BY MOUTH DAILY, Disp: 90 capsule, Rfl: 1    losartan (COZAAR) 25 MG tablet, Take 1 tablet by mouth Daily., Disp: 30 tablet, Rfl: 2    melatonin 5 MG tablet tablet, Take 1 tablet by mouth At Night As Needed., Disp: , Rfl:     metoprolol tartrate (LOPRESSOR) 50 MG tablet, Take 1 tablet by mouth 2 (Two) Times a Day., Disp: 180 tablet, Rfl: 3    nitroglycerin (NITROSTAT) 0.4 MG SL tablet, 1 under the tongue as needed for angina, may repeat q5mins for up three doses, Disp: 100 tablet, Rfl: 11    sildenafil (VIAGRA) 100 MG tablet, TAKE 1 TABLET ONCE DAILY AS NEEDED FOR ERECTILE DYSFUNCTION. (Patient taking differently: Take 1 tablet by mouth As Needed for Erectile Dysfunction.), Disp: 20 tablet, Rfl: 0     "clopidogrel (PLAVIX) 75 MG tablet, Take 1 tablet by mouth Daily. (Patient not taking: Reported on 12/14/2023), Disp: 30 tablet, Rfl: 2  Past Medical History:   Diagnosis Date    Coronary 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 heart with angina pectoris 07/19/2023    Diverticulosis     Erectile disorder due to 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     GERD (gastroesophageal reflux disease)     Heart murmur     Hyperlipidemia     Hypertension     Insomnia     Kidney stone     PONV (postoperative nausea and vomiting)     Prostate cancer      Past Surgical History:   Procedure Laterality Date    CARDIAC CATHETERIZATION N/A 07/24/2023    Procedure: Left Heart Cath;  Surgeon: Blayne Garcia MD;  Location:  [facility] CATH [facility];  Service: Cardiology;  Laterality: N/A;    CHOLECYSTECTOMY      COLONOSCOPY  03/13/2006    left sided diverticulosis    CORONARY ARTERY BYPASS GRAFT N/A 8/29/2023    Procedure: CORONARY ARTERY BYPASS GRAFTING X2 WITH INTERNAL MAMMARY ARTERY GRAFT, RIGHT LEG OPEN VEIN HARVEST, TRANSESOPHAGEAL ECHOCARDIOGRAM;  Surgeon: Ernesto Cheema MD;  Location:  [facility] OR;  Service: Cardiothoracic;  Laterality: N/A;    ENDOSCOPY  03/20/2006    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;s distal esophagus    PROSTATE BIOPSY N/A 06/29/2020    Procedure: PROSTATE ULTRASOUND URONAV FUSION BIOPSY.;  Surgeon: Watson Sheehan MD;  Location:  [facility] OR;  Service: Urology;  Laterality: N/A;           Review of Systems      Objective   PHYSICAL EXAM  Vital Signs:   Temp 96.6 °F (35.9 °C)   Ht 170.2 cm (67\")   Wt 86 kg (189 lb 9.6 oz)   BMI 29.70 kg/m²     Physical Exam      DATA  Result Review :              Results for orders placed or performed in visit on 12/14/23   POC Urinalysis Dipstick, Multipro    Specimen: Urine   Result Value Ref Range    Color Yellow Yellow, Straw, Dark Yellow, Thu    Clarity, UA Clear Clear    Glucose, UA Negative Negative mg/dL    Bilirubin Negative Negative    Ketones, UA Negative " Negative    Specific Gravity  1.015 1.005 - 1.030    Blood, UA Trace (A) Negative    pH, Urine 5.5 5.0 - 8.0    Protein, POC Negative Negative mg/dL    Urobilinogen, UA 0.2 E.U./dL Normal, 0.2 E.U./dL    Nitrite, UA Negative Negative    Leukocytes Negative Negative     Lab Results   Component Value Date    PSA 6.560 (H) 12/06/2023    PSA 5.560 (H) 06/06/2023    PSA 5.300 (H) 12/06/2022           MRI Pelvis With & Without Contrast (12/07/2023 13:36)   PROSTATE:  Diffusely heterogeneous appearance of the prostate.  Prostate volume: 79.31 ml.  Size: 5.5 x 5.4 x 5.1 cm cm  PSA level: 6.560 ng/ml  PSA Density: 0.083 ng/ml2     The prostate was assessed using the PI-RADS 2 scoring system  (http://www.acr.org/Quality-Safety/Resources/PIRADS).     The following lesions are of at least intermediate suspicion (PI-RADS 3  or greater):     Lesion 1:  Side: RIGHT  Location: RIGHT apical lateral   Zone: Peripheral and transitional zones  Craniocaudal: apex  Key images: series 701, image 15  Size: 0.9 mL; largest axial dimensions 12 mm x 10 mm  Extraprostatic extension: No  T2WI score: 5  DWI score: 5  DCE: Focal  Mean ADC: make an JOSSELIN on the ADC map, report using nondecimal scale  (i.e. 683 or 1350, not 0.683 or 1.350)  Overall PI-RADS 2 assessment: 5     Lesion 2:  Side: LEFT  Location: anterior  Zone: Transition zone and anterior stroma  Craniocaudal: Mid  Key images: series 701, image 16  Size: 0.2 mL; largest axial dimensions 6 mm x 4 mm. Note that size  likely slightly underestimated due to marking technicalities.  Extraprostatic extension: Abutting the capsule without convincing extra  prostatic extension.  T2WI score: 4  DWI score: 4  DCE: Nonfocal  Overall PI-RADS 2 assessment: 4     Neurovascular bundle involvement: Not seen.  Seminal vesicles: Unremarkable  Lymph nodes: no adenopathy     Urinary bladder: Under distended, which limits evaluation.  Vessels: Normal flow voids.  Overlying soft tissues: Small fat-containing  "RIGHT inguinal hernia.  Small bilateral hydroceles.  Bones: No acute or suspicious bony finding.        IMPRESSION:  1. Similar heterogeneous appearance of the prostate with similar focal  lesions compared to 11/27/2021. Markings are saved in DynaCAD.  2. No evidence of periprostatic disease.     This report was signed and finalized on 12/8/2023 7:21 AM by Dr Archana Schmitt MD.     The images for the above \"link(s)\" were made available to me to review independently.  I also reviewed the radiologist's report described above with regard to the urologic findings. My interpretation is as follows:  The lesions appear to be in the area where we previously biopsied them.  I do not think they are changed significantly.  There is no evidence of extracapsular disease  /Watson Sheehan MD             ASSESSMENT AND PLAN          Problem List Items Addressed This Visit          Genitourinary and Reproductive     Erectile dysfunction       Hematology and Neoplasia    Prostate cancer - Primary    Relevant Orders    POC Urinalysis Dipstick, Multipro (Completed)   He is only 3 months out a coronary artery bypass graft procedure.  I think we should continue at this point to follow him.  I would repeat a PSA in 6 months.  Will consider biopsy when he returns but he is now 79 years old.  Will also discussed watchful waiting at the next visit.      FOLLOW UP     No follow-ups on file.        (Please note that portions of this note were completed with a voice recognition program.)  Watson Sheehan MD  12/14/23  10:23 CST  "

## 2023-12-07 ENCOUNTER — LAB (OUTPATIENT)
Dept: UROLOGY | Facility: CLINIC | Age: 78
End: 2023-12-07
Payer: MEDICARE

## 2023-12-07 ENCOUNTER — HOSPITAL ENCOUNTER (OUTPATIENT)
Dept: MRI IMAGING | Facility: HOSPITAL | Age: 78
Discharge: HOME | End: 2023-12-07
Admitting: UROLOGY
Payer: MEDICARE

## 2023-12-07 DIAGNOSIS — C61 PROSTATE CANCER: ICD-10-CM

## 2023-12-07 LAB — CREAT BLDA-MCNC: 1.4 MG/DL (ref 0.6–1.3)

## 2023-12-07 PROCEDURE — A9577 INJ MULTIHANCE: HCPCS | Performed by: UROLOGY

## 2023-12-07 PROCEDURE — 82565 ASSAY OF CREATININE: CPT

## 2023-12-07 PROCEDURE — 72197 MRI PELVIS W/O & W/DYE: CPT

## 2023-12-07 PROCEDURE — 0 GADOBENATE DIMEGLUMINE 529 MG/ML SOLUTION: Performed by: UROLOGY

## 2023-12-07 RX ADMIN — GADOBENATE DIMEGLUMINE 20 ML: 529 INJECTION, SOLUTION INTRAVENOUS at 13:36

## 2023-12-08 LAB — PSA SERPL-MCNC: 6.56 NG/ML (ref 0–4)

## 2023-12-14 ENCOUNTER — OFFICE VISIT (OUTPATIENT)
Dept: UROLOGY | Facility: CLINIC | Age: 78
End: 2023-12-14
Payer: MEDICARE

## 2023-12-14 VITALS — HEIGHT: 67 IN | TEMPERATURE: 96.6 F | BODY MASS INDEX: 29.76 KG/M2 | WEIGHT: 189.6 LBS

## 2023-12-14 DIAGNOSIS — C61 PROSTATE CANCER: Primary | ICD-10-CM

## 2023-12-14 DIAGNOSIS — N52.9 ERECTILE DYSFUNCTION, UNSPECIFIED ERECTILE DYSFUNCTION TYPE: ICD-10-CM

## 2023-12-14 LAB
BILIRUB BLD-MCNC: NEGATIVE MG/DL
CLARITY, POC: CLEAR
COLOR UR: YELLOW
GLUCOSE UR STRIP-MCNC: NEGATIVE MG/DL
KETONES UR QL: NEGATIVE
LEUKOCYTE EST, POC: NEGATIVE
NITRITE UR-MCNC: NEGATIVE MG/ML
PH UR: 5.5 [PH] (ref 5–8)
PROT UR STRIP-MCNC: NEGATIVE MG/DL
RBC # UR STRIP: ABNORMAL /UL
SP GR UR: 1.01 (ref 1–1.03)
UROBILINOGEN UR QL: ABNORMAL

## 2023-12-14 PROCEDURE — 1159F MED LIST DOCD IN RCRD: CPT | Performed by: UROLOGY

## 2023-12-14 PROCEDURE — 1160F RVW MEDS BY RX/DR IN RCRD: CPT | Performed by: UROLOGY

## 2023-12-14 PROCEDURE — 81003 URINALYSIS AUTO W/O SCOPE: CPT | Performed by: UROLOGY

## 2023-12-14 PROCEDURE — 99213 OFFICE O/P EST LOW 20 MIN: CPT | Performed by: UROLOGY

## 2023-12-29 RX ORDER — LOSARTAN POTASSIUM 25 MG/1
25 TABLET ORAL DAILY
Qty: 30 TABLET | Refills: 11 | Status: SHIPPED | OUTPATIENT
Start: 2023-12-29

## 2024-01-10 ENCOUNTER — TELEPHONE (OUTPATIENT)
Dept: FAMILY MEDICINE CLINIC | Facility: CLINIC | Age: 79
End: 2024-01-10

## 2024-01-10 RX ORDER — PANTOPRAZOLE SODIUM 40 MG/1
40 TABLET, DELAYED RELEASE ORAL DAILY
Qty: 30 TABLET | Refills: 3 | Status: SHIPPED | OUTPATIENT
Start: 2024-01-10

## 2024-01-10 NOTE — TELEPHONE ENCOUNTER
Caller: Nehemiah Gomez    Relationship: Self    Best call back number: [phone]    Which medication are you concerned about: esomeprazole (nexIUM) 40 MG capsule     PATIENT WOULD LIKE TO CHANGE MEDICATION TO SOMETHING THAT IS COVERED  PATIENT STATES HE DROPPED THIS INFORMATION OFF LAST WEEK AND IS WANTING TO CHECK ON THE STATUS OF THE MEDICATION CHANGE     PLEASE SEND TO     [id number] - [place], [place] - [address] [facility] [phone] [facility] [phone] FX       What are your concerns: INSURANCE DOESN'T COVER THE MEDICATION ANYMORE     PLEASE CALL BACK ONCE SENT

## 2024-01-31 ENCOUNTER — TELEPHONE (OUTPATIENT)
Dept: FAMILY MEDICINE CLINIC | Facility: CLINIC | Age: 79
End: 2024-01-31
Payer: MEDICARE

## 2024-01-31 NOTE — TELEPHONE ENCOUNTER
Caller: Nehemiah Gomez    Relationship to patient: Self    Best call back number: [phone]     Type of visit: LAB     Requested date: BEFORE APPT ON 2/21/14     Additional notes: PATIENT REQUESTING CALLBACK TO SCHEDULE LAB APPT.

## 2024-02-14 DIAGNOSIS — I10 PRIMARY HYPERTENSION: Primary | ICD-10-CM

## 2024-02-14 DIAGNOSIS — I25.119 CORONARY ARTERY DISEASE INVOLVING NATIVE CORONARY ARTERY OF NATIVE HEART WITH ANGINA PECTORIS: ICD-10-CM

## 2024-02-14 DIAGNOSIS — R73.9 ELEVATED BLOOD SUGAR: ICD-10-CM

## 2024-02-14 DIAGNOSIS — E78.2 MIXED HYPERLIPIDEMIA: ICD-10-CM

## 2024-02-15 LAB
ALBUMIN SERPL-MCNC: 3.9 G/DL (ref 3.5–5.2)
ALBUMIN/GLOB SERPL: 1.5 G/DL
ALP SERPL-CCNC: 105 U/L (ref 39–117)
ALT SERPL-CCNC: 14 U/L (ref 1–41)
AST SERPL-CCNC: 18 U/L (ref 1–40)
BASOPHILS # BLD AUTO: 0.03 10*3/MM3 (ref 0–0.2)
BASOPHILS NFR BLD AUTO: 0.6 % (ref 0–1.5)
BILIRUB SERPL-MCNC: 0.7 MG/DL (ref 0–1.2)
BUN SERPL-MCNC: 18 MG/DL (ref 8–23)
BUN/CREAT SERPL: 16.8 (ref 7–25)
CALCIUM SERPL-MCNC: 8.9 MG/DL (ref 8.6–10.5)
CHLORIDE SERPL-SCNC: 105 MMOL/L (ref 98–107)
CHOLEST SERPL-MCNC: 124 MG/DL (ref 0–200)
CHOLEST/HDLC SERPL: 3.02 {RATIO}
CO2 SERPL-SCNC: 24.9 MMOL/L (ref 22–29)
CREAT SERPL-MCNC: 1.07 MG/DL (ref 0.76–1.27)
EGFRCR SERPLBLD CKD-EPI 2021: 71.5 ML/MIN/1.73
EOSINOPHIL # BLD AUTO: 0.09 10*3/MM3 (ref 0–0.4)
EOSINOPHIL NFR BLD AUTO: 1.7 % (ref 0.3–6.2)
ERYTHROCYTE [DISTWIDTH] IN BLOOD BY AUTOMATED COUNT: 14.5 % (ref 12.3–15.4)
GLOBULIN SER CALC-MCNC: 2.6 GM/DL
GLUCOSE SERPL-MCNC: 114 MG/DL (ref 65–99)
HBA1C MFR BLD: 6.1 % (ref 4.8–5.6)
HCT VFR BLD AUTO: 45.7 % (ref 37.5–51)
HDLC SERPL-MCNC: 41 MG/DL (ref 40–60)
HGB BLD-MCNC: 15.4 G/DL (ref 13–17.7)
IMM GRANULOCYTES # BLD AUTO: 0.01 10*3/MM3 (ref 0–0.05)
IMM GRANULOCYTES NFR BLD AUTO: 0.2 % (ref 0–0.5)
LDLC SERPL CALC-MCNC: 66 MG/DL (ref 0–100)
LYMPHOCYTES # BLD AUTO: 0.74 10*3/MM3 (ref 0.7–3.1)
LYMPHOCYTES NFR BLD AUTO: 14.3 % (ref 19.6–45.3)
MCH RBC QN AUTO: 28.2 PG (ref 26.6–33)
MCHC RBC AUTO-ENTMCNC: 33.7 G/DL (ref 31.5–35.7)
MCV RBC AUTO: 83.7 FL (ref 79–97)
MONOCYTES # BLD AUTO: 0.98 10*3/MM3 (ref 0.1–0.9)
MONOCYTES NFR BLD AUTO: 18.9 % (ref 5–12)
NEUTROPHILS # BLD AUTO: 3.34 10*3/MM3 (ref 1.7–7)
NEUTROPHILS NFR BLD AUTO: 64.3 % (ref 42.7–76)
NRBC BLD AUTO-RTO: 0 /100 WBC (ref 0–0.2)
PLATELET # BLD AUTO: 178 10*3/MM3 (ref 140–450)
POTASSIUM SERPL-SCNC: 3.9 MMOL/L (ref 3.5–5.2)
PROT SERPL-MCNC: 6.5 G/DL (ref 6–8.5)
RBC # BLD AUTO: 5.46 10*6/MM3 (ref 4.14–5.8)
SODIUM SERPL-SCNC: 143 MMOL/L (ref 136–145)
TRIGL SERPL-MCNC: 90 MG/DL (ref 0–150)
VLDLC SERPL CALC-MCNC: 17 MG/DL (ref 5–40)
WBC # BLD AUTO: 5.19 10*3/MM3 (ref 3.4–10.8)

## 2024-02-19 RX ORDER — AMITRIPTYLINE HYDROCHLORIDE 25 MG/1
25 TABLET, FILM COATED ORAL NIGHTLY
Qty: 90 TABLET | Refills: 3 | Status: SHIPPED | OUTPATIENT
Start: 2024-02-19

## 2024-02-21 ENCOUNTER — OFFICE VISIT (OUTPATIENT)
Dept: FAMILY MEDICINE CLINIC | Facility: CLINIC | Age: 79
End: 2024-02-21
Payer: MEDICARE

## 2024-02-21 VITALS
HEIGHT: 67 IN | BODY MASS INDEX: 29.82 KG/M2 | WEIGHT: 190 LBS | SYSTOLIC BLOOD PRESSURE: 130 MMHG | OXYGEN SATURATION: 93 % | DIASTOLIC BLOOD PRESSURE: 64 MMHG | RESPIRATION RATE: 16 BRPM | TEMPERATURE: 98.5 F | HEART RATE: 80 BPM

## 2024-02-21 DIAGNOSIS — I10 PRIMARY HYPERTENSION: Primary | ICD-10-CM

## 2024-02-21 DIAGNOSIS — R93.89 ABNORMAL CXR: ICD-10-CM

## 2024-02-21 RX ORDER — OMEPRAZOLE 40 MG/1
40 CAPSULE, DELAYED RELEASE ORAL DAILY
Qty: 30 CAPSULE | Refills: 5 | Status: SHIPPED | OUTPATIENT
Start: 2024-02-21

## 2024-02-21 NOTE — PROGRESS NOTES
The ABCs of the Annual Wellness Visit  Subsequent Medicare Wellness Visit    Subjective      Nehemiah Gomez is a 78 y.o. male who presents for a Subsequent Medicare Wellness Visit.    The following portions of the patient's history were reviewed and   updated as appropriate: allergies, current medications, past family history, past medical history, past social history, past surgical history, and problem list.    Compared to one year ago, the patient feels his physical   health is the same.    Compared to one year ago, the patient feels his mental   health is the same.    Recent Hospitalizations:  This patient has had a [facility] admission record on file within the last 365 days.    Current Medical Providers:  Patient Care Team:  Erwin Ruelas MD as PCP - General  Davion Moreno MD as Consulting Physician (Urology)  Watson Sheehan MD as Consulting Physician (Urology)  Hernan Escamilla DO as Consulting Physician (Gastroenterology)  Ernesto Cheema MD as Consulting Physician (Cardiothoracic Surgery)    Outpatient Medications Prior to Visit   Medication Sig Dispense Refill    amitriptyline (ELAVIL) 25 MG tablet TAKE 1 TABLET BY MOUTH EVERY NIGHT 90 tablet 3    aspirin 81 MG EC tablet Take 1 tablet by mouth Daily.      atorvastatin (LIPITOR) 40 MG tablet Take 1 tablet by mouth Every Night. 90 tablet 3    losartan (COZAAR) 25 MG tablet TAKE 1 TABLET BY MOUTH DAILY 30 tablet 11    melatonin 5 MG tablet tablet Take 1 tablet by mouth At Night As Needed.      metoprolol tartrate (LOPRESSOR) 50 MG tablet Take 1 tablet by mouth 2 (Two) Times a Day. 180 tablet 3    nitroglycerin (NITROSTAT) 0.4 MG SL tablet 1 under the tongue as needed for angina, may repeat q5mins for up three doses 100 tablet 11    pantoprazole (PROTONIX) 40 MG EC tablet Take 1 tablet by mouth Daily. 30 tablet 3    sildenafil (VIAGRA) 100 MG tablet TAKE 1 TABLET ONCE DAILY AS NEEDED FOR ERECTILE DYSFUNCTION. (Patient taking  "differently: Take 1 tablet by mouth As Needed for Erectile Dysfunction.) 20 tablet 0    esomeprazole (nexIUM) 40 MG capsule TAKE 1 CAPSULE BY MOUTH DAILY 90 capsule 1     No facility-administered medications prior to visit.       No opioid medication identified on active medication list. I have reviewed chart for other potential  high risk medication/s and harmful drug interactions in the elderly.        Aspirin is on active medication list. Aspirin use is indicated based on review of current medical condition/s. Pros and cons of this therapy have been discussed today. Benefits of this medication outweigh potential harm.  Patient has been encouraged to continue taking this medication.  .      Patient Active Problem List   Diagnosis    Hypertension    GERD without esophagitis    Hyperlipidemia    Erectile dysfunction    Elevated prostate specific antigen (PSA)    BPH (benign prostatic hypertrophy) with urinary obstruction    Cough    Bronchitis    Primary insomnia    Benign prostatic hyperplasia with lower urinary tract symptoms    Prostate cancer    Scabies    Coronary 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 heart with angina pectoris    Abnormal stress test    Left main coronary artery disease    CAD (coronary artery disease), native coronary artery    Coronary 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 heart    Lung nodule    Abnormal CXR     Advance Care Planning   Advance Care Planning     Advance Directive is not on file.  ACP discussion was held with the patient during this visit. Patient does not have an advance directive, information provided.     Objective    Vitals:    02/21/24 1054   BP: 130/64   Pulse: 80   Resp: 16   Temp: 98.5 °F (36.9 °C)   SpO2: 93%   Weight: 86.2 kg (190 lb)   Height: 170.2 cm (67.01\")     Estimated body mass index is 29.75 kg/m² as calculated from the following:    Height as of this encounter: 170.2 cm (67.01\").    Weight as of this encounter: 86.2 " kg (190 lb).           Does the patient have evidence of cognitive impairment?   No    Lab Results   Component Value Date    CHLPL 124 2024    TRIG 90 2024    HDL 41 2024    LDL 66 2024    VLDL 17 2024    HGBA1C 6.10 (H) 2024          HEALTH RISK ASSESSMENT    Smoking Status:  Social History     Tobacco Use   Smoking Status Former    Packs/day: 1.30    Years: 28.00    Additional pack years: 0.00    Total pack years: 36.40    Types: Cigarettes    Start date: 1959    Quit date: 1984    Years since quittin.1    Passive exposure: Past   Smokeless Tobacco Never     Alcohol Consumption:  Social History     Substance and Sexual Activity   Alcohol Use Not Currently    Alcohol/week: 1.0 standard drink of alcohol    Types: 1 Cans of beer per week     Fall Risk Screen:    KIKI Fall Risk Assessment was completed, and patient is at LOW risk for falls.Assessment completed on:2024    Depression Screenin/21/2024    10:57 AM   PHQ-2/PHQ-9 Depression Screening   Little Interest or Pleasure in Doing Things 0-->not at all   Feeling Down, Depressed or Hopeless 0-->not at all   PHQ-9: Brief Depression Severity Measure Score 0       Health Habits and Functional and Cognitive Screenin/21/2024    10:00 AM   Functional & Cognitive Status   Do you have difficulty preparing food and eating? No   Do you have difficulty bathing yourself, getting dressed or grooming yourself? No   Do you have difficulty using the toilet? No   Do you have difficulty moving around from place to place? No   Do you have trouble with steps or getting out of a bed or a chair? No   Current Diet Well Balanced Diet   Dental Exam Up to date   Eye Exam Up to date   Exercise (times per week) 5 times per week   Current Exercises Include House Cleaning   Do you need help using the phone?  No   Are you deaf or do you have serious difficulty hearing?  No   Do you need help to go to places out of walking  distance? No   Do you need help shopping? No   Do you need help preparing meals?  No   Do you need help with housework?  No   Do you need help with laundry? No   Do you need help taking your medications? No   Do you need help managing money? No   Do you ever drive or ride in a car without wearing a seat belt? No   Have you felt unusual stress, anger or loneliness in the last month? No   Who do you live with? Spouse   If you need help, do you have trouble finding someone available to you? No   Have you been bothered in the last four weeks by sexual problems? No   Do you have difficulty concentrating, remembering or making decisions? No       Age-appropriate Screening Schedule:  Refer to the list below for future screening recommendations based on patient's age, sex and/or medical conditions. Orders for these recommended tests are listed in the plan section. The patient has been provided with a written plan.    Health Maintenance   Topic Date Due    TDAP/TD VACCINES (1 - Tdap) Never done    RSV Vaccine - Adults (1 - 1-dose 60+ series) Never done    HEPATITIS C SCREENING  Never done    Pneumococcal Vaccine 65+ (2 of 2 - PPSV23 or PCV20) 10/21/2016    ZOSTER VACCINE (2 of 2) 11/26/2016    ANNUAL WELLNESS VISIT  08/26/2023    BMI FOLLOWUP  02/28/2024    LIPID PANEL  02/14/2025    COLORECTAL CANCER SCREENING  03/17/2026    COVID-19 Vaccine  Completed    INFLUENZA VACCINE  Completed                  CMS Preventative Services Quick Reference  Risk Factors Identified During Encounter:    Fall Risk-High or Moderate: Discussed Fall Prevention in the home    The above risks/problems have been discussed with the patient.  Pertinent information has been shared with the patient in the After Visit Summary.    Diagnoses and all orders for this visit:    1. Primary hypertension (Primary)    2. Abnormal CXR  -     CT Chest Without Contrast    Other orders  -     omeprazole (priLOSEC) 40 MG capsule; Take 1 capsule by mouth Daily.   Dispense: 30 capsule; Refill: 5        Follow Up:   Next Medicare Wellness visit to be scheduled in 1 year.      An After Visit Summary and PPPS were made available to the patient.

## 2024-02-21 NOTE — PROGRESS NOTES
Subjective   Nehemiah Gomez is a 78 y.o. male.     Chief Complaint   Patient presents with    Hypertension    Medicare Wellness-subsequent        Hypertension         He notsw good bp control withot cp or ha -- he would like to have ct scan of chst due to abnormal cxr      Current Outpatient Medications:     amitriptyline (ELAVIL) 25 MG tablet, TAKE 1 TABLET BY MOUTH EVERY NIGHT, Disp: 90 tablet, Rfl: 3    aspirin 81 MG EC tablet, Take 1 tablet by mouth Daily., Disp: , Rfl:     atorvastatin (LIPITOR) 40 MG tablet, Take 1 tablet by mouth Every Night., Disp: 90 tablet, Rfl: 3    losartan (COZAAR) 25 MG tablet, TAKE 1 TABLET BY MOUTH DAILY, Disp: 30 tablet, Rfl: 11    melatonin 5 MG tablet tablet, Take 1 tablet by mouth At Night As Needed., Disp: , Rfl:     metoprolol tartrate (LOPRESSOR) 50 MG tablet, Take 1 tablet by mouth 2 (Two) Times a Day., Disp: 180 tablet, Rfl: 3    nitroglycerin (NITROSTAT) 0.4 MG SL tablet, 1 under the tongue as needed for angina, may repeat q5mins for up three doses, Disp: 100 tablet, Rfl: 11    pantoprazole (PROTONIX) 40 MG EC tablet, Take 1 tablet by mouth Daily., Disp: 30 tablet, Rfl: 3    sildenafil (VIAGRA) 100 MG tablet, TAKE 1 TABLET ONCE DAILY AS NEEDED FOR ERECTILE DYSFUNCTION. (Patient taking differently: Take 1 tablet by mouth As Needed for Erectile Dysfunction.), Disp: 20 tablet, Rfl: 0    omeprazole (priLOSEC) 40 MG capsule, Take 1 capsule by mouth Daily., Disp: 30 capsule, Rfl: 5  No Known Allergies           Facility age limit for growth %marjan is 20 years.    Past Medical History:   Diagnosis Date    Coronary 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 heart with angina pectoris 07/19/2023    Diverticulosis     Erectile disorder due to 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     GERD (gastroesophageal reflux disease)     Heart murmur     Hyperlipidemia     Hypertension     Insomnia     Kidney stone     PONV (postoperative nausea and vomiting)     Prostate cancer       Past Surgical History:   Procedure Laterality Date    CARDIAC CATHETERIZATION N/A 07/24/2023    Procedure: Left Heart Cath;  Surgeon: Blayne Garcia MD;  Location:  [facility] CATH INVASIVE LOCATION;  Service: Cardiology;  Laterality: N/A;    CHOLECYSTECTOMY      COLONOSCOPY  03/13/2006    left sided diverticulosis    CORONARY ARTERY BYPASS GRAFT N/A 8/29/2023    Procedure: CORONARY ARTERY BYPASS GRAFTING X2 WITH INTERNAL MAMMARY ARTERY GRAFT, RIGHT LEG OPEN VEIN HARVEST, TRANSESOPHAGEAL ECHOCARDIOGRAM;  Surgeon: Ernesto Cheema MD;  Location:  [facility] OR;  Service: Cardiothoracic;  Laterality: N/A;    ENDOSCOPY  03/20/2006    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    HENT: Negative.     Eyes: Negative.    Respiratory: Negative.     Cardiovascular: Negative.    Gastrointestinal: Negative.    Endocrine: Negative.    Genitourinary: Negative.    Musculoskeletal: Negative.    Skin: Negative.    Allergic/Immunologic: Negative.    Neurological: Negative.    Hematological: Negative.    Psychiatric/Behavioral: Negative.  Negative for agitation.        Objective   Physical Exam  Vitals and nursing note reviewed.   Constitutional:       Appearance: Normal appearance.   HENT:      Head: Normocephalic.      Nose: Nose normal.      Mouth/Throat:      Mouth: Mucous membranes are moist.   Eyes:      Pupils: Pupils are equal, round, and reactive to light.   Cardiovascular:      Rate and Rhythm: Normal rate and regular rhythm.      Pulses: Normal pulses.      Heart sounds: Normal heart sounds.   Pulmonary:      Effort: Pulmonary effort is normal.   Abdominal:      General: Abdomen is flat. Bowel sounds are normal.      Palpations: Abdomen is soft.   Musculoskeletal:         General: Normal range of motion.       Cervical back: Normal range of motion and neck supple.   Skin:     General: Skin is warm and dry.      Capillary Refill: Capillary refill takes less than 2 seconds.   Neurological:      General: No focal deficit present.      Mental Status: He is alert.   Psychiatric:         Mood and Affect: Mood normal.         Assessment & Plan   Diagnoses and all orders for this visit:    1. Primary hypertension (Primary)    2. Abnormal CXR  -     CT Chest Without Contrast    Other orders  -     omeprazole (priLOSEC) 40 MG capsule; Take 1 capsule by mouth Daily.  Dispense: 30 capsule; Refill: 5      We dsiscussed recent labs  c         Orders Placed This Encounter   Procedures    CT Chest Without Contrast     Order Specific Question:   Does this exam require Regan Bronch Protocol?     Answer:   No     Order Specific Question:   Release to patient     Answer:   Routine Release [[id number]]       Follow up: 6 month(s)

## 2024-02-29 ENCOUNTER — HOSPITAL ENCOUNTER (OUTPATIENT)
Dept: CT IMAGING | Facility: HOSPITAL | Age: 79
Discharge: HOME | End: 2024-02-29
Admitting: FAMILY MEDICINE
Payer: MEDICARE

## 2024-02-29 PROCEDURE — 71250 CT THORAX DX C-: CPT

## 2024-03-08 RX ORDER — ATORVASTATIN CALCIUM 40 MG/1
40 TABLET, FILM COATED ORAL NIGHTLY
Qty: 90 TABLET | Refills: 3 | Status: SHIPPED | OUTPATIENT
Start: 2024-03-08

## 2024-03-22 ENCOUNTER — OFFICE VISIT (OUTPATIENT)
Dept: CARDIOLOGY | Facility: CLINIC | Age: 79
End: 2024-03-22
Payer: MEDICARE

## 2024-03-22 VITALS
DIASTOLIC BLOOD PRESSURE: 73 MMHG | HEIGHT: 67 IN | HEART RATE: 52 BPM | WEIGHT: 190 LBS | SYSTOLIC BLOOD PRESSURE: 145 MMHG | OXYGEN SATURATION: 98 % | BODY MASS INDEX: 29.82 KG/M2

## 2024-03-22 DIAGNOSIS — I25.10 CORONARY ARTERY DISEASE INVOLVING NATIVE CORONARY ARTERY OF NATIVE HEART WITHOUT ANGINA PECTORIS: Primary | ICD-10-CM

## 2024-03-22 DIAGNOSIS — E78.2 MIXED HYPERLIPIDEMIA: ICD-10-CM

## 2024-03-22 DIAGNOSIS — I10 PRIMARY HYPERTENSION: ICD-10-CM

## 2024-03-22 PROCEDURE — 1159F MED LIST DOCD IN RCRD: CPT | Performed by: INTERNAL MEDICINE

## 2024-03-22 PROCEDURE — 99214 OFFICE O/P EST MOD 30 MIN: CPT | Performed by: INTERNAL MEDICINE

## 2024-03-22 PROCEDURE — 3077F SYST BP >= 140 MM HG: CPT | Performed by: INTERNAL MEDICINE

## 2024-03-22 PROCEDURE — 3078F DIAST BP <80 MM HG: CPT | Performed by: INTERNAL MEDICINE

## 2024-03-22 PROCEDURE — 1160F RVW MEDS BY RX/DR IN RCRD: CPT | Performed by: INTERNAL MEDICINE

## 2024-03-22 PROCEDURE — 93000 ELECTROCARDIOGRAM COMPLETE: CPT | Performed by: INTERNAL MEDICINE

## 2024-03-22 RX ORDER — ROSUVASTATIN CALCIUM 10 MG/1
10 TABLET, COATED ORAL DAILY
Qty: 30 TABLET | Refills: 11 | Status: SHIPPED | OUTPATIENT
Start: 2024-03-22

## 2024-03-22 NOTE — PROGRESS NOTES
"Chief Complaint  Coronary Artery Disease (6 month follow up-Recent labs in Epic from 02/14/24 (Ordered by pcp))    Subjective      Nehemiah Gomez presents to [facility] CARDIOLOGY  Coronary Artery Disease    Nehemiah is doing extremely well.  He underwent two-vessel CABG for distal left main stenosis in September of last year.  He has had no angina or unusual dyspnea.  There is no PND, orthopnea, syncope or near syncope.    His only complaint is that of myalgias which he attributes to his atorvastatin.      Objective   Vital Signs:  /73 (BP Location: Left arm, Patient Position: Sitting, Cuff Size: Adult)   Pulse 52   Ht 170.2 cm (67.01\")   Wt 86.2 kg (190 lb)   SpO2 98%   BMI 29.75 kg/m²   Estimated body mass index is 29.75 kg/m² as calculated from the following:    Height as of this encounter: 170.2 cm (67.01\").    Weight as of this encounter: 86.2 kg (190 lb).          Physical Exam    This is a pleasant 78-year-old gentleman in no apparent distress.  He is awake, alert and oriented.  HEENT: No scleral icterus.  Neck: No jugular venous distention.  Lungs: Clear to auscultation.  Heart: Regular rhythm with normal S1 and S2 and no audible murmurs or gallops sounds.  Extremities: No cyanosis or edema.  Pedal pulses intact.  Neurologic: No obvious focal abnormalities.      Result Review :              ECG 12 Lead    Date/Time: 3/22/2024 1:00 PM  Performed by: Davion Garcia MD    Authorized by: Davion Garcia MD  Comparison: compared with previous ECG from 9/1/2023  Comparison to previous ECG: Low voltage and T wave abnormalities are no longer present when compared to previous ECG  Rhythm: sinus rhythm  Rate: normal  Conduction: conduction normal  ST Segments: ST segments normal  T Waves: T waves normal  QRS axis: normal  Other: no other findings    Clinical impression: normal ECG            Assessment and Plan   Diagnoses and all orders for this visit:    1. Coronary " 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 heart without angina pectoris (Primary)  -     ECG 12 Lead    2. Mixed hyperlipidemia  -     ECG 12 Lead    3. Primary hypertension  -     ECG 12 Lead    Other orders  -     rosuvastatin (CRESTOR) 10 MG tablet; Take 1 tablet by mouth Daily.  Dispense: 30 tablet; Refill: 11    1.  Coronary artery disease.  He is asymptomatic with no angina and a normal EKG today.  Continue guideline directed therapy including low-dose aspirin and statin.  There will be a change in his statin as outlined in #2 below.  The patient is also on a beta-blocker.    2.  Hyperlipidemia.  He is having myalgias which are likely due to the a atorvastatin.  I will take the liberty of switching him to rosuvastatin 10 mg daily.  We may need to ramp this up to at least 20 mg daily at some point.  I will plan to obtain a lipid and liver panel in around 3 months when he comes back to see me.  Dr. Ruelas may also order the studies and change the medicine as necessary since he has been the treating physician for this issue.  I recommended that the patient also take co-Q10.    3.  Hypertension.  Blood pressure is 145/73 at today's visit and is usually well-controlled at home.  Continue current dose of losartan 25 mg daily and metoprolol long-acting 50 mg daily.    All questions were answered.  He is encouraged to contact me for any further concerns.  A return visit is scheduled in 3 months.    As always I appreciate the opportunity to participate in the care of your patient.  Thank you very much for referring him.  Your referral ultimately led to a statistically increased lifespan for the patient.      There are no Patient Instructions on file for this visit.       Follow Up   Return in about 3 months (around 6/22/2024) for Next scheduled follow up.  Patient was given instructions and counseling regarding his condition or for health maintenance advice. Please see specific information pulled into  the AVS if appropriate.

## 2024-04-04 RX ORDER — METOPROLOL TARTRATE 50 MG/1
50 TABLET, FILM COATED ORAL 2 TIMES DAILY
Qty: 180 TABLET | Refills: 3 | Status: SHIPPED | OUTPATIENT
Start: 2024-04-04

## 2024-05-29 NOTE — PROGRESS NOTES
Chief Complaint  Prostate Cancer    Subjective          Nehemiah Gomez presents to [facility] UROLOGY   Patient on active surveillance presents today for follow-up of his prostate cancer.  Patient denies any possible systemic symptoms of prostate cancer such as weight loss, lower extremity edema, or skeletal pain that could be worrisome for systemic disease.  No change in lower urinary tract symptoms.  No major health events since I have last seen him.  He continues to remain active doing most of the things he wants to do.    Urologic history  -06/19/2020; multiparmetric MRI prostate: PI-RADS 4 lesion in right lateral apex peripheral zone.  -07/01/2020: UroNav Fusion TRUS biopsy prostate: [place] grade 3+3 = 6 in 15% of targeted lesion  111/27/2021: multiparametric MRI prostate: PI-RADS 4 lesion in right lateral apex peripheral zone (previously biopsied) and PI-RADS4 lesion in the left mid anterior transition zone >1cm.     Patient also has erectile dysfunction.  This has been an issue for about 10 years.  He currently takes sildenafil 100 mg as needed.  He is content with the response as well as lack of significant side effects.  He understands that he cannot take nitroglycerin within 24 hours of having taken sildenafil.  He can also cannot take the sildenafil within 24 hours of nitroglycerin which he understands.            Current Outpatient Medications:     amitriptyline (ELAVIL) 25 MG tablet, TAKE 1 TABLET BY MOUTH EVERY NIGHT, Disp: 90 tablet, Rfl: 3    aspirin 81 MG EC tablet, Take 1 tablet by mouth Daily., Disp: , Rfl:     losartan (COZAAR) 25 MG tablet, TAKE 1 TABLET BY MOUTH DAILY, Disp: 30 tablet, Rfl: 11    melatonin 5 MG tablet tablet, Take 1 tablet by mouth At Night As Needed., Disp: , Rfl:     metoprolol tartrate (LOPRESSOR) 50 MG tablet, TAKE 1 TABLET BY MOUTH TWICE DAILY, Disp: 180 tablet, Rfl: 3    nitroglycerin (NITROSTAT) 0.4 MG SL tablet, 1 under the tongue as needed for  "angina, may repeat q5mins for up three doses, Disp: 100 tablet, Rfl: 11    omeprazole (priLOSEC) 40 MG capsule, Take 1 capsule by mouth Daily., Disp: 30 capsule, Rfl: 5    pantoprazole (PROTONIX) 40 MG EC tablet, Take 1 tablet by mouth Daily., Disp: 30 tablet, Rfl: 3    rosuvastatin (CRESTOR) 10 MG tablet, Take 1 tablet by mouth Daily., Disp: 30 tablet, Rfl: 11    sildenafil (VIAGRA) 100 MG tablet, TAKE 1 TABLET ONCE DAILY AS NEEDED FOR ERECTILE DYSFUNCTION. (Patient taking differently: Take 1 tablet by mouth As Needed for Erectile Dysfunction.), Disp: 20 tablet, Rfl: 0  Past Medical History:   Diagnosis Date    Coronary 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 heart with angina pectoris 07/19/2023    Diverticulosis     Erectile disorder due to 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     GERD (gastroesophageal reflux disease)     Heart murmur     Hyperlipidemia     Hypertension     Insomnia     Kidney stone     PONV (postoperative nausea and vomiting)     Prostate cancer      Past Surgical History:   Procedure Laterality Date    CARDIAC CATHETERIZATION N/A 07/24/2023    Procedure: Left Heart Cath;  Surgeon: Blayne Garcia MD;  Location:  [facility] CATH [facility]ASIVE [facility];  Service: Cardiology;  Laterality: N/A;    CHOLECYSTECTOMY      COLONOSCOPY  03/13/2006    left sided diverticulosis    CORONARY ARTERY BYPASS GRAFT N/A 8/29/2023    Procedure: CORONARY ARTERY BYPASS GRAFTING X2 WITH INTERNAL MAMMARY ARTERY GRAFT, RIGHT LEG OPEN VEIN HARVEST, TRANSESOPHAGEAL ECHOCARDIOGRAM;  Surgeon: Ernesto Cheema MD;  Location: [facility] OR;  Service: Cardiothoracic;  Laterality: N/A;    ENDOSCOPY  03/20/2006    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;s distal esophagus    PROSTATE BIOPSY N/A 06/29/2020    Procedure: PROSTATE ULTRASOUND URONAV FUSION BIOPSY.;  Surgeon: Watson Sheehan MD;  Location:  [facility] OR;  Service: Urology;  Laterality: N/A;           Review of Systems      Objective   PHYSICAL EXAM  Vital Signs:   Ht 170.2 cm (67\")   Wt " 84.8 kg (187 lb)   BMI 29.29 kg/m²     Physical Exam  Appears generally healthy    DATA  Result Review :              Results for orders placed or performed in visit on 06/13/24   POC Urinalysis Dipstick, Multipro    Specimen: Urine   Result Value Ref Range    Color Yellow Yellow, Straw, Dark Yellow, Thu    Clarity, UA Clear Clear    Glucose, UA Negative Negative mg/dL    Bilirubin Negative Negative    Ketones, UA Negative Negative    Specific Gravity  1.020 1.005 - 1.030    Blood, UA Negative Negative    pH, Urine 7.0 5.0 - 8.0    Protein, POC Negative Negative mg/dL    Urobilinogen, UA Normal Normal, 0.2 E.U./dL    Nitrite, UA Negative Negative    Leukocytes Negative Negative     Lab Results   Component Value Date    PSA 7.100 (H) 06/05/2024    PSA 6.560 (H) 12/06/2023    PSA 5.560 (H) 06/06/2023    PSA 5.300 (H) 12/06/2022    PSA 9.200 (H) 05/31/2022    PSA 6.450 (H) 11/15/2021    PSA 4.670 (H) 05/06/2021    PSA 5.860 (H) 06/19/2020    PSA 5.200 (H) 12/10/2019    PSA 6.810 (H) 04/19/2019    PSA 6.130 (H) 02/19/2019    PSA 4.730 (H) 08/17/2018    PSA 4.240 (H) 02/27/2018    PSA 3.510 08/22/2017    PSA 4.8 (H) 06/26/2017              ASSESSMENT AND PLAN          Problem List Items Addressed This Visit          Genitourinary and Reproductive     Erectile dysfunction       Hematology and Neoplasia    Prostate cancer - Primary    Relevant Orders    POC Urinalysis Dipstick, Multipro (Completed)   Each of these chronic Urologic conditions, which I have followed >1 year,  were evaluated and managed today as follows:     Considerable increase in his PSA since last visit.  This is a new peak for him.  While this could be concerning for progression, he has done this before and then the PSA came down on his own.  I think it warrants a repeat PSA in 6 months.  At that point we should set him up for his next surveillance biopsy.  He would like to continue active surveillance as opposed to watchful waiting which is explained  to the patient.  Last MRI was December 2023.    Continue sildenafil for his erectile dysfunction at present dose.  And reiterated nitroglycerin warnings.      FOLLOW UP     Return in about 6 months (around 12/13/2024).        (Please note that portions of this note were completed with a voice recognition program.)  Watson Sheehan MD  06/14/24  06:49 CDT

## 2024-06-05 ENCOUNTER — LAB (OUTPATIENT)
Dept: LAB | Facility: HOSPITAL | Age: 79
End: 2024-06-05
Payer: MEDICARE

## 2024-06-05 DIAGNOSIS — C61 PROSTATE CANCER: ICD-10-CM

## 2024-06-05 LAB — PSA SERPL-MCNC: 7.1 NG/ML (ref 0–4)

## 2024-06-05 PROCEDURE — 36415 COLL VENOUS BLD VENIPUNCTURE: CPT

## 2024-06-05 PROCEDURE — 84153 ASSAY OF PSA TOTAL: CPT

## 2024-06-13 ENCOUNTER — OFFICE VISIT (OUTPATIENT)
Dept: UROLOGY | Facility: CLINIC | Age: 79
End: 2024-06-13
Payer: OTHER GOVERNMENT

## 2024-06-13 VITALS — BODY MASS INDEX: 29.35 KG/M2 | HEIGHT: 67 IN | WEIGHT: 187 LBS

## 2024-06-13 DIAGNOSIS — N52.9 ERECTILE DYSFUNCTION, UNSPECIFIED ERECTILE DYSFUNCTION TYPE: ICD-10-CM

## 2024-06-13 DIAGNOSIS — C61 PROSTATE CANCER: Primary | ICD-10-CM

## 2024-06-13 PROCEDURE — 81003 URINALYSIS AUTO W/O SCOPE: CPT | Performed by: UROLOGY

## 2024-06-13 PROCEDURE — 99214 OFFICE O/P EST MOD 30 MIN: CPT | Performed by: UROLOGY

## 2024-06-24 ENCOUNTER — OFFICE VISIT (OUTPATIENT)
Dept: CARDIOLOGY | Facility: CLINIC | Age: 79
End: 2024-06-24
Payer: MEDICARE

## 2024-06-24 VITALS
BODY MASS INDEX: 28.72 KG/M2 | SYSTOLIC BLOOD PRESSURE: 133 MMHG | HEIGHT: 67 IN | WEIGHT: 183 LBS | DIASTOLIC BLOOD PRESSURE: 85 MMHG | HEART RATE: 54 BPM

## 2024-06-24 DIAGNOSIS — I10 PRIMARY HYPERTENSION: ICD-10-CM

## 2024-06-24 DIAGNOSIS — I25.10 CORONARY ARTERY DISEASE INVOLVING NATIVE CORONARY ARTERY OF NATIVE HEART WITHOUT ANGINA PECTORIS: Primary | ICD-10-CM

## 2024-06-24 DIAGNOSIS — E78.2 MIXED HYPERLIPIDEMIA: ICD-10-CM

## 2024-06-24 PROCEDURE — 99213 OFFICE O/P EST LOW 20 MIN: CPT | Performed by: INTERNAL MEDICINE

## 2024-06-24 PROCEDURE — 3075F SYST BP GE 130 - 139MM HG: CPT | Performed by: INTERNAL MEDICINE

## 2024-06-24 PROCEDURE — 1159F MED LIST DOCD IN RCRD: CPT | Performed by: INTERNAL MEDICINE

## 2024-06-24 PROCEDURE — 3079F DIAST BP 80-89 MM HG: CPT | Performed by: INTERNAL MEDICINE

## 2024-06-24 PROCEDURE — 1160F RVW MEDS BY RX/DR IN RCRD: CPT | Performed by: INTERNAL MEDICINE

## 2024-06-24 RX ORDER — AMOXICILLIN 500 MG/1
CAPSULE ORAL
COMMUNITY
Start: 2024-06-17

## 2024-06-24 NOTE — PROGRESS NOTES
"Chief Complaint  Coronary Artery Disease (3 mo f/u)    Subjective      Nehemiah Gomez presents to [facility] CARDIOLOGY  History of Present Illness  Nehemiah is now receiving his medical care at the [facility] in [facility].  He had complete lab work performed in May of this year and was told that he should not require any further lab work anywhere else at this time.  Nehemiah has not had any anginal type chest discomfort or unusual dyspnea.  There has been no PND, orthopnea, palpitations, syncope or near syncope.  He remains active.    Objective   Vital Signs:  /85   Pulse 54   Ht 170.2 cm (67\")   Wt 83 kg (183 lb)   BMI 28.66 kg/m²   Estimated body mass index is 28.66 kg/m² as calculated from the following:    Height as of this encounter: 170.2 cm (67\").    Weight as of this encounter: 83 kg (183 lb).          Physical Exam  A 78-year-old male who is awake, alert and oriented x 3.  He is in no distress.  HEENT: No scleral icterus.  Normocephalic.  Neck: No carotid bruits or jugular venous distention.  Lungs: Clear to auscultation.  Heart: Regular rhythm with normal S1 and S2 and no audible murmurs or gallops sounds.  Extremities: Trace pretibial edema.  No cyanosis.  Neurologic: No obvious focal abnormalities noted.    Result Review :                   Assessment and Plan   Diagnoses and all orders for this visit:    1. Coronary 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 heart without angina pectoris (Primary)    2. Mixed hyperlipidemia    3. Primary hypertension    1.  Coronary artery disease.  There has been no angina.  Continue guideline directed therapy of low-dose aspirin, beta-blockers and statins.    2.  Hyperlipidemia.  He is on a atorvastatin 10 mg daily.  [facility], however I cannot find the results.  Continue the atorvastatin at current dose for now.    3.  Hypertension.  Current blood pressure is 133/85.  Continue losartan 25 mg " daily and metoprolol tartrate 50 mg twice daily.    All questions are answered.  A return visit is scheduled in 6 months.  He is encouraged to contact us for any further concerns.      There are no Patient Instructions on file for this visit.       Follow Up   Return in about 6 months (around 12/24/2024) for Next scheduled follow up.  Patient was given instructions and counseling regarding his condition or for health maintenance advice. Please see specific information pulled into the AVS if appropriate.

## 2024-11-21 NOTE — PROGRESS NOTES
Chief Complaint  Prostate Cancer     Subjective          Nehemiah Gomez presents to [facility] UROLOGY to continue active surveillance for adenocarcinoma the prostate.  Although he is 78 years old he is adamant about active surveillance as opposed to watchful waiting.  Patient denies any possible systemic symptoms of prostate cancer such as weight loss, lower extremity edema, or skeletal pain that could be worrisome for systemic disease.        Urologic history  -06/19/2020; multiparmetric MRI prostate: PI-RADS 4 lesion in right lateral apex peripheral zone.  -07/01/2020: UroNav Fusion TRUS biopsy prostate: [place]avel grade 3+3 = 6 in 15% of targeted lesion  11/27/2021: multiparametric MRI prostate: PI-RADS 4 lesion in right lateral apex peripheral zone (previously biopsied) and PI-RADS4 lesion in the left mid anterior transition zone >1cm.   12/7/23: PI-RADS 5 lesion in the right lateral apex involving both peripheral and transition zone.  PI-RADS 4 lesion in the left anterior transition zone and stroma.       Patient also has erectile dysfunction.  He currently takes sildenafil. It works well for him. NO side effects.       Current Outpatient Medications:     amitriptyline (ELAVIL) 25 MG tablet, TAKE 1 TABLET BY MOUTH EVERY NIGHT, Disp: 90 tablet, Rfl: 3    aspirin 81 MG EC tablet, Take 1 tablet by mouth Daily., Disp: , Rfl:     losartan (COZAAR) 25 MG tablet, TAKE 1 TABLET BY MOUTH DAILY, Disp: 30 tablet, Rfl: 11    melatonin 5 MG tablet tablet, Take 1 tablet by mouth At Night As Needed., Disp: , Rfl:     metoprolol tartrate (LOPRESSOR) 50 MG tablet, TAKE 1 TABLET BY MOUTH TWICE DAILY, Disp: 180 tablet, Rfl: 3    nitroglycerin (NITROSTAT) 0.4 MG SL tablet, 1 under the tongue as needed for angina, may repeat q5mins for up three doses, Disp: 100 tablet, Rfl: 11    omeprazole (priLOSEC) 40 MG capsule, Take 1 capsule by mouth Daily., Disp: 30 capsule, Rfl: 5    rosuvastatin (CRESTOR) 10 MG tablet, Take  "1 tablet by mouth Daily., Disp: 30 tablet, Rfl: 11    sildenafil (VIAGRA) 100 MG tablet, TAKE 1 TABLET ONCE DAILY AS NEEDED FOR ERECTILE DYSFUNCTION. (Patient taking differently: Take 1 tablet by mouth As Needed for Erectile Dysfunction.), Disp: 20 tablet, Rfl: 0  Past Medical History:   Diagnosis Date    Coronary 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 heart with angina pectoris 07/19/2023    Diverticulosis     Erectile disorder due to 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     GERD (gastroesophageal reflux disease)     Heart murmur     Hyperlipidemia     Hypertension     Insomnia     Kidney stone     PONV (postoperative nausea and vomiting)     Prostate cancer      Past Surgical History:   Procedure Laterality Date    CARDIAC CATHETERIZATION N/A 07/24/2023    Procedure: Left Heart Cath;  Surgeon: Blayne Garcia MD;  Location:  [facility] CATH [facility]VAS[facility] LOC[facility];  Service: Cardiology;  Laterality: N/A;    CHOLECYSTECTOMY      COLONOSCOPY  03/13/2006    left sided diverticulosis    CORONARY ARTERY BYPASS GRAFT N/A 8/29/2023    Procedure: CORONARY ARTERY BYPASS GRAFTING X2 WITH INTERNAL MAMMARY ARTERY GRAFT, RIGHT LEG OPEN VEIN HARVEST, TRANSESOPHAGEAL ECHOCARDIOGRAM;  Surgeon: Ernesto Cheema MD;  Location:  [facility] OR;  Service: Cardiothoracic;  Laterality: N/A;    ENDOSCOPY  03/20/2006    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;s distal esophagus    PROSTATE BIOPSY N/A 06/29/2020    Procedure: PROSTATE ULTRASOUND URONAV FUSION BIOPSY.;  Surgeon: Watson Sheehan MD;  Location:  [facility] OR;  Service: Urology;  Laterality: N/A;           Review of Systems      Objective   PHYSICAL EXAM  Vital Signs:   Temp 97 °F (36.1 °C)   Ht 170.2 cm (67\")   Wt 86.6 kg (191 lb)   BMI 29.91 kg/m²     Physical Exam      DATA  Result Review :              Results for orders placed or performed in visit on 12/05/24   POC Urinalysis Dipstick, Multipro    Collection Time: 12/05/24 10:03 AM    Specimen: Urine   Result Value Ref Range    Color Yellow " Yellow, Straw, Dark Yellow, Thu    Clarity, UA Clear Clear    Glucose, UA Negative Negative mg/dL    Bilirubin Negative Negative    Ketones, UA Negative Negative    Specific Gravity  1.015 1.005 - 1.030    Blood, UA Negative Negative    pH, Urine 6.0 5.0 - 8.0    Protein, POC Negative Negative mg/dL    Urobilinogen, UA 1 E.U./dL (A) Normal, 0.2 E.U./dL    Nitrite, UA Negative Negative    Leukocytes Negative Negative             Lab Results   Component Value Date    PSA 7.050 (H) 11/27/2024    PSA 7.100 (H) 06/05/2024    PSA 6.560 (H) 12/06/2023    PSA 5.560 (H) 06/06/2023    PSA 5.300 (H) 12/06/2022    PSA 9.200 (H) 05/31/2022    PSA 6.450 (H) 11/15/2021    PSA 4.670 (H) 05/06/2021    PSA 5.860 (H) 06/19/2020    PSA 5.200 (H) 12/10/2019    PSA 6.810 (H) 04/19/2019    PSA 6.130 (H) 02/19/2019                ASSESSMENT AND PLAN          Problem List Items Addressed This Visit          Genitourinary and Reproductive     Erectile dysfunction       Hematology and Neoplasia    Prostate cancer - Primary    Relevant Orders    POC Urinalysis Dipstick, Multipro (Completed)    PSA DIAGNOSTIC (Completed)   Each of these chronic Urologic conditions, which I have followed >1 year,  were evaluated and managed today as follows:   From prostate cancer standpoint he is stable at this time.  I have been managing this for 5 years.  We have been on active surveillance but I have explained to him the difference with it and watchful waiting.  He is now 78.  He is becoming more comfortable with the idea of not having another biopsy as long as his PSA remains relatively stable.  I doubt that treatment would prolong overall survival or improve his quality of life.  In fact I be very concerned the latter would be worsened.  Continue sildenafil 100 mg dose.  Did discuss the option of tadalafil.  He prefers the former.      FOLLOW UP     Return in about 1 year (around 12/5/2025) for PSA before visit.        (Please note that portions of this  note were completed with a voice recognition program.)  Watson Sheehan MD  12/05/24  10:25 CST

## 2024-11-27 ENCOUNTER — LAB (OUTPATIENT)
Dept: LAB | Facility: HOSPITAL | Age: 79
End: 2024-11-27
Payer: MEDICARE

## 2024-11-27 ENCOUNTER — TELEPHONE (OUTPATIENT)
Dept: UROLOGY | Facility: CLINIC | Age: 79
End: 2024-11-27
Payer: OTHER GOVERNMENT

## 2024-11-27 DIAGNOSIS — C61 PROSTATE CANCER: ICD-10-CM

## 2024-11-27 LAB — PSA SERPL-MCNC: 7.05 NG/ML (ref 0–4)

## 2024-11-27 PROCEDURE — 36415 COLL VENOUS BLD VENIPUNCTURE: CPT

## 2024-11-27 PROCEDURE — 84153 ASSAY OF PSA TOTAL: CPT

## 2024-12-05 ENCOUNTER — OFFICE VISIT (OUTPATIENT)
Dept: UROLOGY | Facility: CLINIC | Age: 79
End: 2024-12-05
Payer: OTHER GOVERNMENT

## 2024-12-05 VITALS — BODY MASS INDEX: 29.98 KG/M2 | WEIGHT: 191 LBS | TEMPERATURE: 97 F | HEIGHT: 67 IN

## 2024-12-05 DIAGNOSIS — C61 PROSTATE CANCER: Primary | ICD-10-CM

## 2024-12-05 DIAGNOSIS — N52.9 ERECTILE DYSFUNCTION, UNSPECIFIED ERECTILE DYSFUNCTION TYPE: ICD-10-CM

## 2024-12-05 LAB
BILIRUB BLD-MCNC: NEGATIVE MG/DL
CLARITY, POC: CLEAR
COLOR UR: YELLOW
GLUCOSE UR STRIP-MCNC: NEGATIVE MG/DL
KETONES UR QL: NEGATIVE
LEUKOCYTE EST, POC: NEGATIVE
NITRITE UR-MCNC: NEGATIVE MG/ML
PH UR: 6 [PH] (ref 5–8)
PROT UR STRIP-MCNC: NEGATIVE MG/DL
RBC # UR STRIP: NEGATIVE /UL
SP GR UR: 1.01 (ref 1–1.03)
UROBILINOGEN UR QL: ABNORMAL

## 2024-12-11 ENCOUNTER — TELEPHONE (OUTPATIENT)
Dept: UROLOGY | Facility: CLINIC | Age: 79
End: 2024-12-11
Payer: OTHER GOVERNMENT

## 2024-12-11 DIAGNOSIS — C61 PROSTATE CANCER: Primary | ICD-10-CM

## 2024-12-11 DIAGNOSIS — N52.9 ERECTILE DYSFUNCTION, UNSPECIFIED ERECTILE DYSFUNCTION TYPE: ICD-10-CM

## 2024-12-11 RX ORDER — SILDENAFIL 100 MG/1
100 TABLET, FILM COATED ORAL AS NEEDED
Qty: 20 TABLET | Refills: 0 | Status: SHIPPED | OUTPATIENT
Start: 2024-12-11

## 2024-12-11 NOTE — TELEPHONE ENCOUNTER
Caller: Nehemiah Gomez    Relationship: Self    Best call back number: [phone]    Requested Prescriptions:   sildenafil (VIAGRA) 100 MG tablet        Pharmacy where request should be sent:    [facility]    Last office visit with prescribing clinician: 12/5/2024   Last telemedicine visit with prescribing clinician: Visit date not found   Next office visit with prescribing clinician: Visit date not found       Does the patient have less than a 3 day supply:  [x] Yes  [] No    Would you like a call back once the refill request has been completed: [x] Yes [] No    If the office needs to give you a call back, can they leave a voicemail: [x] Yes [] No    Immanuel Cyr Rep   12/11/24 09:49 CST

## 2024-12-20 DIAGNOSIS — N52.9 ERECTILE DYSFUNCTION, UNSPECIFIED ERECTILE DYSFUNCTION TYPE: ICD-10-CM

## 2024-12-20 RX ORDER — SILDENAFIL 100 MG/1
100 TABLET, FILM COATED ORAL AS NEEDED
Qty: 20 TABLET | Refills: 3 | Status: SHIPPED | OUTPATIENT
Start: 2024-12-20

## 2025-02-13 ENCOUNTER — OFFICE VISIT (OUTPATIENT)
Dept: CARDIOLOGY | Facility: CLINIC | Age: 80
End: 2025-02-13
Payer: MEDICARE

## 2025-02-13 VITALS
BODY MASS INDEX: 29.66 KG/M2 | DIASTOLIC BLOOD PRESSURE: 78 MMHG | HEIGHT: 67 IN | OXYGEN SATURATION: 98 % | RESPIRATION RATE: 18 BRPM | SYSTOLIC BLOOD PRESSURE: 128 MMHG | WEIGHT: 189 LBS | HEART RATE: 45 BPM

## 2025-02-13 DIAGNOSIS — E78.2 MIXED HYPERLIPIDEMIA: ICD-10-CM

## 2025-02-13 DIAGNOSIS — I10 PRIMARY HYPERTENSION: ICD-10-CM

## 2025-02-13 DIAGNOSIS — I25.10 CORONARY ARTERY DISEASE INVOLVING NATIVE CORONARY ARTERY OF NATIVE HEART WITHOUT ANGINA PECTORIS: Primary | ICD-10-CM

## 2025-02-13 DIAGNOSIS — R91.1 LUNG NODULE: ICD-10-CM

## 2025-02-13 NOTE — PROGRESS NOTES
"    Subjective:     Encounter Date:02/13/2025      Patient ID: Nehemiah Gomez is a 79 y.o. male.    Chief Complaint:\"I need a CT scan for a spot on my lung\"  Coronary Artery Disease  Presents for follow-up visit. Pertinent negatives include no chest pain, dizziness, leg swelling, palpitations, shortness of breath or weight gain. Risk factors include hyperlipidemia. The symptoms have been stable.   Hypertension  This is a chronic problem. Pertinent negatives include no chest pain, malaise/fatigue, orthopnea, palpitations, PND or shortness of breath.   Hyperlipidemia  This is a chronic problem. Pertinent negatives include no chest pain or shortness of breath.     Patient presents today as a routine follow up. Patient was referred to Dr. Garcia in 7/2023 for further evaluation of exertional chest pain. He underwent a stress echo following his visit with Dr. Garcia which was noted to be abnormal with echocardiographic evidence for myocardial ischemia. Subsequent LHC per interventional cardiologist, Dr. Garcia, revealed severe distal left main disease and moderate-severe stenosis throughout OM1. He was referred to CT surgery and ultimately underwent a 2v CABG (LIMA-LAD and SVG-OM1) per Dr. Cheema in 8/2023.        He presents today and reports he feels well. He is inquiring about getting a CT scan as routine follow up for a lung nodule. He has not seen his PCP for yearly labs as of yet. He denies chest pain, dyspnea, palpitations, and edema. His HR is lower today (45) than normal (50-60s) however denies lightheadedness, dizziness, syncope and near syncope.     The following portions of the patient's history were reviewed and updated as appropriate: allergies, current medications, past family history, past medical history, past social history, past surgical history and problem list.    No Known Allergies    Current Outpatient Medications:     amitriptyline (ELAVIL) 25 MG tablet, TAKE 1 TABLET BY MOUTH EVERY NIGHT, " Disp: 90 tablet, Rfl: 3    aspirin 81 MG EC tablet, Take 1 tablet by mouth Daily., Disp: , Rfl:     losartan (COZAAR) 25 MG tablet, TAKE 1 TABLET BY MOUTH DAILY, Disp: 30 tablet, Rfl: 11    melatonin 5 MG tablet tablet, Take 1 tablet by mouth At Night As Needed., Disp: , Rfl:     metoprolol tartrate (LOPRESSOR) 50 MG tablet, TAKE 1 TABLET BY MOUTH TWICE DAILY, Disp: 180 tablet, Rfl: 3    nitroglycerin (NITROSTAT) 0.4 MG SL tablet, 1 under the tongue as needed for angina, may repeat q5mins for up three doses, Disp: 100 tablet, Rfl: 11    omeprazole (priLOSEC) 40 MG capsule, Take 1 capsule by mouth Daily., Disp: 30 capsule, Rfl: 5    rosuvastatin (CRESTOR) 10 MG tablet, Take 1 tablet by mouth Daily., Disp: 30 tablet, Rfl: 11    sildenafil (VIAGRA) 100 MG tablet, Take 1 tablet by mouth As Needed for Erectile Dysfunction., Disp: 20 tablet, Rfl: 3  Past Medical History:   Diagnosis Date    Coronary 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 heart with angina pectoris 2023    Diverticulosis     Erectile disorder due to 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     GERD (gastroesophageal reflux disease)     Heart murmur     Hyperlipidemia     Hypertension     Insomnia     Kidney stone     PONV (postoperative nausea and vomiting)     Prostate cancer        Social History     Socioeconomic History    Marital status:    Tobacco Use    Smoking status: Former     Current packs/day: 0.00     Average packs/day: 1.3 packs/day for 28.0 years (36.4 ttl pk-yrs)     Types: Cigarettes     Start date: 1959     Quit date: 1984     Years since quittin.1     Passive exposure: Past    Smokeless tobacco: Never   Vaping Use    Vaping status: Never Used   Substance and Sexual Activity    Alcohol use: Not Currently     Alcohol/week: 1.0 standard drink of alcohol     Types: 1 Cans of beer per week    Drug use: No    Sexual activity: Yes     Partners: Female     Comment: None       Review of Systems    Constitutional: Negative for malaise/fatigue, weight gain and weight loss.   Cardiovascular:  Negative for chest pain, dyspnea on exertion, irregular heartbeat, leg swelling, near-syncope, orthopnea, palpitations, paroxysmal nocturnal dyspnea and syncope.   Respiratory:  Negative for cough, shortness of breath, sleep disturbances due to breathing, sputum production and wheezing.    Skin:  Negative for dry skin, flushing, itching and rash.   Gastrointestinal:  Negative for hematemesis and hematochezia.   Neurological:  Negative for dizziness, light-headedness, loss of balance and weakness.   All other systems reviewed and are negative.         Objective:     Vitals reviewed.   Constitutional:       General: Not in acute distress.     Appearance: Healthy appearance. Well-developed. Not diaphoretic.   Eyes:      General: No scleral icterus.     Conjunctiva/sclera: Conjunctivae normal.      Pupils: Pupils are equal, round, and reactive to light.   HENT:      Head: Normocephalic.    Mouth/Throat:      Pharynx: No oropharyngeal exudate.   Neck:      Vascular: No JVR.   Pulmonary:      Effort: Pulmonary effort is normal. No respiratory distress.      Breath sounds: Normal breath sounds. No wheezing. No rhonchi. No rales.   Chest:      Chest wall: Not tender to palpatation.   Cardiovascular:      Bradycardia present. Regular rhythm.   Pulses:     Intact distal pulses.   Edema:     Peripheral edema absent.   Abdominal:      General: Bowel sounds are normal. There is no distension.      Palpations: Abdomen is soft.      Tenderness: There is no abdominal tenderness.   Musculoskeletal: Normal range of motion.      Cervical back: Normal range of motion and neck supple. Skin:     General: Skin is warm and dry.      Coloration: Skin is not pale.      Findings: No erythema or rash.   Neurological:      Mental Status: Alert, oriented to person, place, and time and oriented to person, place and time.      Deep Tendon Reflexes:  "Reflexes are normal and symmetric.   Psychiatric:         Behavior: Behavior normal.             ECG 12 Lead    Date/Time: 2/13/2025 1:51 PM  Performed by: Shyana Perez APRN    Authorized by: Shayna Perez APRN  Comparison: compared with previous ECG from 3/22/2024  Similar to previous ECG  Rhythm: sinus bradycardia and sinus arrhythmia  Rate: bradycardic  BPM: 45  Conduction: conduction normal  ST Segments: ST segments normal  T inversion: III and V1  QRS axis: normal  Other findings: T wave abnormality    Clinical impression: abnormal EKG        /78 (BP Location: Right arm, Patient Position: Sitting, Cuff Size: Adult)   Pulse (!) 45   Resp 18   Ht 170.2 cm (67\")   Wt 85.7 kg (189 lb)   SpO2 98%   BMI 29.60 kg/m²     Lab Review:   I have reviewed previous office notes, recent labs and recent cardiac testing.     Lab Results   Component Value Date    CHLPL 124 02/14/2024    TRIG 90 02/14/2024    HDL 41 02/14/2024    LDL 66 02/14/2024   Results for orders placed during the hospital encounter of 08/29/23    Intra-Op TAVR Transesophageal Echo    Interpretation Summary    Unremarkable intraoperative exam.    LV systolic function appears normal on initial images, and remains preserved on concluding images.    No significant valvular pathology.      Cath 7/2023:      Stress echo 7/2023:        Assessment:          Diagnosis Plan   1. Coronary 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 heart without angina pectoris        2. Primary hypertension        3. Mixed hyperlipidemia        4. Lung nodule  CT Chest Without Contrast             Plan:       1 CAD- stable. s/p 2v CABG in 8/2023. No clinical signs of ongoing ischemia.  Continue ASA 81mg daily, losartan 25mg daily, lopressor 50mg daily and crestor 10mg daily   2. HTN- controlled. Continue losartan and lopressor. HR currently 45 however he is asymptomatic. Will continue to monitor. Should he develop symptoms or his HR be lower at his " follow up, would consider reducing lopressor dose.   3. HLD- most recent lipid on file from 2/14/2024 and controlled at 66. Continue crestor 10mg daily.  4. Lung nodule- will repeat CT scan per patient request. Will call with results.     Follow up in 6 months or sooner if symptoms worsen.     I spent 30 minutes caring for Nehemiah on this date of service. This time includes time spent by me in the following activities:preparing for the visit, reviewing tests, obtaining and/or reviewing a separately obtained history, performing a medically appropriate examination and/or evaluation , counseling and educating the patient/family/caregiver, and documenting information in the medical record    I spent 2 minutes on the separately reported service of EKG interpretation. This time is not included in the time used to support the E/M service also reported today.

## 2025-02-26 ENCOUNTER — HOSPITAL ENCOUNTER (OUTPATIENT)
Dept: CT IMAGING | Facility: HOSPITAL | Age: 80
Discharge: HOME | End: 2025-02-26
Admitting: NURSE PRACTITIONER
Payer: MEDICARE

## 2025-02-26 DIAGNOSIS — R91.1 LUNG NODULE: ICD-10-CM

## 2025-02-26 PROCEDURE — 71250 CT THORAX DX C-: CPT

## 2025-05-21 ENCOUNTER — TRANSCRIBE ORDERS (OUTPATIENT)
Dept: ADMINISTRATIVE | Facility: HOSPITAL | Age: 80
End: 2025-05-21
Payer: OTHER GOVERNMENT

## 2025-05-21 DIAGNOSIS — R91.1 PULMONARY NODULE: Primary | ICD-10-CM

## 2025-06-12 ENCOUNTER — HOSPITAL ENCOUNTER (OUTPATIENT)
Dept: CT IMAGING | Facility: HOSPITAL | Age: 80
Discharge: HOME | End: 2025-06-12
Admitting: FAMILY MEDICINE
Payer: OTHER GOVERNMENT

## 2025-06-12 DIAGNOSIS — R91.1 PULMONARY NODULE: ICD-10-CM

## 2025-06-12 PROCEDURE — 71250 CT THORAX DX C-: CPT

## 2025-06-26 DIAGNOSIS — N52.9 ERECTILE DYSFUNCTION, UNSPECIFIED ERECTILE DYSFUNCTION TYPE: ICD-10-CM

## 2025-06-26 RX ORDER — SILDENAFIL 100 MG/1
TABLET, FILM COATED ORAL
Qty: 18 TABLET | Refills: 11 | Status: SHIPPED | OUTPATIENT
Start: 2025-06-26

## 2025-06-26 NOTE — TELEPHONE ENCOUNTER
Patient is counseled on use of nitroglycerin with sildenafil.  He has not taken Nitrostat tablets in a very long time.  I think he understands he cannot use both within 24 hours of each other.

## (undated) DEVICE — CATH DIAG EXPO .052 PIG145 6F 110CM

## (undated) DEVICE — CATH F6 INF TL JL 3.5 100 CM: Brand: INFINITI

## (undated) DEVICE — TR BAND RADIAL ARTERY COMPRESSION DEVICE: Brand: TR BAND

## (undated) DEVICE — PK OPN HEART 30

## (undated) DEVICE — BLAKE SILICONE DRAINS CARDIO CONNECTOR 2:1: Brand: BLAKE

## (undated) DEVICE — SOLIDIFIER LIQUI LOC PLUS 2000CC

## (undated) DEVICE — GW STARTER FXD CORE J .035 3X260CM 3MM

## (undated) DEVICE — ELECTRD BLD MEGADYNE EZCLEAN STD 2.75IN XLNG

## (undated) DEVICE — ADHS LIQ MASTISOL 2/3ML

## (undated) DEVICE — SYR LUERLOK 20CC BX/50

## (undated) DEVICE — HLDR PROB URONAV

## (undated) DEVICE — CATH URETH INTRMIT ALLPURP LTX 16F RED

## (undated) DEVICE — MARKR SKIN W/RULR AND LBL

## (undated) DEVICE — TRAP FLD MINIVAC MEGADYNE 100ML

## (undated) DEVICE — PK CATH CARD 30 CA/4

## (undated) DEVICE — GLV SURG BIOGEL M LTX PF 7 1/2

## (undated) DEVICE — PAD, DEFIB, ADULT, RADIOTRANS, PHYSIO: Brand: MEDLINE

## (undated) DEVICE — OASIS DRAIN, SINGLE, INLINE & ATS COMPATIBLE: Brand: OASIS

## (undated) DEVICE — BAPTIST TURNOVER KIT: Brand: MEDLINE INDUSTRIES, INC.

## (undated) DEVICE — GLIDESHEATH SLENDER STAINLESS STEEL KIT: Brand: GLIDESHEATH SLENDER

## (undated) DEVICE — ELECTRD BLD EZ CLN MOD 4IN

## (undated) DEVICE — PRESSURE MONITORING SET: Brand: TRUWAVE

## (undated) DEVICE — KT ANTI FOG W/FLD AND SPNG

## (undated) DEVICE — ROTATING SURGICAL PUNCHES, 1 PER POUCH: Brand: A&E MEDICAL / ROTATING SURGICAL PUNCHES

## (undated) DEVICE — CLTH CLENS READYCLEANSE PERI CARE PK/5

## (undated) DEVICE — BNDG ELAS ECON W/CLIP 4IN 5YD LF STRL

## (undated) DEVICE — GLV SURG BIOGEL M LTX PF 8

## (undated) DEVICE — DRSNG SURESITE123 4X10IN

## (undated) DEVICE — SUREFIT, DUAL DISPERSIVE ELECTRODE, CONTACT QUALITY MONITOR: Brand: SUREFIT

## (undated) DEVICE — Device

## (undated) DEVICE — E-PACK PROCEDURE KIT: Brand: E-PACK

## (undated) DEVICE — CANN NASL ETCO2 LO/FLO A/

## (undated) DEVICE — TOWEL,OR,DSP,ST,BLUE,STD,4/PK,20PK/CS: Brand: MEDLINE

## (undated) DEVICE — SUT SILK 2/0 SH CR8 18IN CR8 C012D

## (undated) DEVICE — OPTIFOAM GENTLE SA, POSTOP, 4X12: Brand: MEDLINE

## (undated) DEVICE — SUP ARMBRD ART/LINE BLU

## (undated) DEVICE — DRAPE,SPLIT,CV,CLR ANES,STERILE: Brand: MEDLINE

## (undated) DEVICE — STERNUM BLADE, OFFSET (32.0 X 0.8 X 6.3MM)

## (undated) DEVICE — ST TBG PNEUMOCLEAR EVAC SMOKE HIFLO

## (undated) DEVICE — 1/2 FORCE SURGICAL SPRING CLIP: Brand: STEALTH® SPRING CLIP

## (undated) DEVICE — Device: Brand: MEDEX

## (undated) DEVICE — GLV SURG BIOGEL LTX PF 6 1/2

## (undated) DEVICE — SCANLAN® SURG-I-PAW® INSTRUMENT COVERS - RED, 1/10" X 5"/ 3 MMX13 CM (2 - 5" PCS /PKG): Brand: SCANLAN® SURG-I-PAW® INSTRUMENT COVERS

## (undated) DEVICE — ANTIBACTERIAL UNDYED BRAIDED (POLYGLACTIN 910), SYNTHETIC ABSORBABLE SUTURE: Brand: COATED VICRYL

## (undated) DEVICE — SOL IRR NACL 0.9PCT BT 1000ML

## (undated) DEVICE — DRAIN,WOUND,ROUND,24FR,5/16",FULL-FLUTED: Brand: MEDLINE

## (undated) DEVICE — SUT PROLN 4/0 RB1 36IN 4PK M8557

## (undated) DEVICE — DRSNG WND SIL OPTIFOAM GNTL BRDR ADHS 1.6X2IN

## (undated) DEVICE — DRAPE,ANGIO,BRACH,STERILE,38X44: Brand: MEDLINE

## (undated) DEVICE — MAX-CORE® DISPOSABLE CORE BIOPSY INSTRUMENT, 18G X 25CM: Brand: MAX-CORE

## (undated) DEVICE — DRSNG SURESITE WNDW 4X4.5

## (undated) DEVICE — PK TURNOVER RM ADV

## (undated) DEVICE — STRIP,CLOSURE,WOUND,MEDI-STRIP,1/2X4: Brand: MEDLINE

## (undated) DEVICE — PK SET UP ANES OPN HEART 30

## (undated) DEVICE — SUT SILK 2/0 FS BLK 18IN 685G

## (undated) DEVICE — BLAKE CARDIO CONNECTOR 1:1: Brand: J-VAC

## (undated) DEVICE — SPNG GZ WOVN 4X4IN 12PLY 10/BX STRL

## (undated) DEVICE — LP VESL MAXI 2.5X1MM RED 2PK

## (undated) DEVICE — SUT ETHIB 2/0 SH SH 36IN X523H

## (undated) DEVICE — RADIFOCUS OPTITORQUE ANGIOGRAPHIC CATHETER: Brand: OPTITORQUE